# Patient Record
Sex: FEMALE | Race: WHITE | NOT HISPANIC OR LATINO | Employment: UNEMPLOYED | URBAN - METROPOLITAN AREA
[De-identification: names, ages, dates, MRNs, and addresses within clinical notes are randomized per-mention and may not be internally consistent; named-entity substitution may affect disease eponyms.]

---

## 2017-01-27 RX ORDER — LOSARTAN POTASSIUM 100 MG/1
TABLET ORAL
Qty: 30 TABLET | Refills: 2 | OUTPATIENT
Start: 2017-01-27

## 2017-01-27 NOTE — TELEPHONE ENCOUNTER
Pt needs to be seen for further refills, called and informed pt, per pt she will give us a call back to schedule f/u appt.

## 2017-11-29 ENCOUNTER — APPOINTMENT (OUTPATIENT)
Dept: NUCLEAR MEDICINE | Facility: HOSPITAL | Age: 57
End: 2017-11-29

## 2017-11-29 ENCOUNTER — APPOINTMENT (OUTPATIENT)
Dept: GENERAL RADIOLOGY | Facility: HOSPITAL | Age: 57
End: 2017-11-29

## 2017-11-29 ENCOUNTER — HOSPITAL ENCOUNTER (EMERGENCY)
Facility: HOSPITAL | Age: 57
Discharge: HOME | End: 2017-11-29
Attending: EMERGENCY MEDICINE | Admitting: EMERGENCY MEDICINE

## 2017-11-29 VITALS
BODY MASS INDEX: 37.05 KG/M2 | OXYGEN SATURATION: 92 % | WEIGHT: 217 LBS | DIASTOLIC BLOOD PRESSURE: 91 MMHG | HEIGHT: 64 IN | RESPIRATION RATE: 18 BRPM | SYSTOLIC BLOOD PRESSURE: 156 MMHG | HEART RATE: 99 BPM | TEMPERATURE: 98.6 F

## 2017-11-29 DIAGNOSIS — J40 BRONCHITIS: ICD-10-CM

## 2017-11-29 DIAGNOSIS — R74.8 ELEVATED LIVER ENZYMES: Primary | ICD-10-CM

## 2017-11-29 DIAGNOSIS — I10 HYPERTENSION, UNSPECIFIED TYPE: ICD-10-CM

## 2017-11-29 DIAGNOSIS — R06.02 SHORTNESS OF BREATH: ICD-10-CM

## 2017-11-29 DIAGNOSIS — R07.9 CHEST PAIN, UNSPECIFIED TYPE: ICD-10-CM

## 2017-11-29 LAB
ALBUMIN SERPL-MCNC: 4.5 G/DL (ref 3.2–4.8)
ALBUMIN/GLOB SERPL: 1.3 G/DL (ref 1.5–2.5)
ALP SERPL-CCNC: 111 U/L (ref 25–100)
ALT SERPL W P-5'-P-CCNC: 64 U/L (ref 7–40)
ANION GAP SERPL CALCULATED.3IONS-SCNC: 8 MMOL/L (ref 3–11)
AST SERPL-CCNC: 97 U/L (ref 0–33)
BASOPHILS # BLD AUTO: 0.02 10*3/MM3 (ref 0–0.2)
BASOPHILS NFR BLD AUTO: 0.3 % (ref 0–1)
BILIRUB SERPL-MCNC: 1.2 MG/DL (ref 0.3–1.2)
BNP SERPL-MCNC: 18 PG/ML (ref 0–100)
BUN BLD-MCNC: 7 MG/DL (ref 9–23)
BUN/CREAT SERPL: 11.7 (ref 7–25)
CALCIUM SPEC-SCNC: 9.5 MG/DL (ref 8.7–10.4)
CHLORIDE SERPL-SCNC: 103 MMOL/L (ref 99–109)
CO2 SERPL-SCNC: 26 MMOL/L (ref 20–31)
CREAT BLD-MCNC: 0.6 MG/DL (ref 0.6–1.3)
DEPRECATED RDW RBC AUTO: 54.5 FL (ref 37–54)
EOSINOPHIL # BLD AUTO: 0.12 10*3/MM3 (ref 0–0.3)
EOSINOPHIL NFR BLD AUTO: 1.7 % (ref 0–3)
ERYTHROCYTE [DISTWIDTH] IN BLOOD BY AUTOMATED COUNT: 14.2 % (ref 11.3–14.5)
FLUAV AG NPH QL: NEGATIVE
FLUBV AG NPH QL IA: NEGATIVE
GFR SERPL CREATININE-BSD FRML MDRD: 102 ML/MIN/1.73
GLOBULIN UR ELPH-MCNC: 3.5 GM/DL
GLUCOSE BLD-MCNC: 128 MG/DL (ref 70–100)
HCT VFR BLD AUTO: 45.4 % (ref 34.5–44)
HGB BLD-MCNC: 15.9 G/DL (ref 11.5–15.5)
HOLD SPECIMEN: NORMAL
HOLD SPECIMEN: NORMAL
IMM GRANULOCYTES # BLD: 0.02 10*3/MM3 (ref 0–0.03)
IMM GRANULOCYTES NFR BLD: 0.3 % (ref 0–0.6)
LIPASE SERPL-CCNC: 45 U/L (ref 6–51)
LYMPHOCYTES # BLD AUTO: 2.11 10*3/MM3 (ref 0.6–4.8)
LYMPHOCYTES NFR BLD AUTO: 29.8 % (ref 24–44)
MCH RBC QN AUTO: 36.6 PG (ref 27–31)
MCHC RBC AUTO-ENTMCNC: 35 G/DL (ref 32–36)
MCV RBC AUTO: 104.6 FL (ref 80–99)
MONOCYTES # BLD AUTO: 0.52 10*3/MM3 (ref 0–1)
MONOCYTES NFR BLD AUTO: 7.3 % (ref 0–12)
NEUTROPHILS # BLD AUTO: 4.3 10*3/MM3 (ref 1.5–8.3)
NEUTROPHILS NFR BLD AUTO: 60.6 % (ref 41–71)
PLATELET # BLD AUTO: 160 10*3/MM3 (ref 150–450)
PMV BLD AUTO: 10.9 FL (ref 6–12)
POTASSIUM BLD-SCNC: 3.7 MMOL/L (ref 3.5–5.5)
PROT SERPL-MCNC: 8 G/DL (ref 5.7–8.2)
RBC # BLD AUTO: 4.34 10*6/MM3 (ref 3.89–5.14)
SODIUM BLD-SCNC: 137 MMOL/L (ref 132–146)
TROPONIN I SERPL-MCNC: 0 NG/ML (ref 0–0.07)
TROPONIN I SERPL-MCNC: 0 NG/ML (ref 0–0.07)
WBC NRBC COR # BLD: 7.09 10*3/MM3 (ref 3.5–10.8)
WHOLE BLOOD HOLD SPECIMEN: NORMAL
WHOLE BLOOD HOLD SPECIMEN: NORMAL

## 2017-11-29 PROCEDURE — 83690 ASSAY OF LIPASE: CPT | Performed by: EMERGENCY MEDICINE

## 2017-11-29 PROCEDURE — 87804 INFLUENZA ASSAY W/OPTIC: CPT | Performed by: EMERGENCY MEDICINE

## 2017-11-29 PROCEDURE — A9540 TC99M MAA: HCPCS | Performed by: EMERGENCY MEDICINE

## 2017-11-29 PROCEDURE — 96376 TX/PRO/DX INJ SAME DRUG ADON: CPT

## 2017-11-29 PROCEDURE — 96374 THER/PROPH/DIAG INJ IV PUSH: CPT

## 2017-11-29 PROCEDURE — A9558 XE133 XENON 10MCI: HCPCS | Performed by: EMERGENCY MEDICINE

## 2017-11-29 PROCEDURE — 25010000002 ONDANSETRON PER 1 MG

## 2017-11-29 PROCEDURE — 96361 HYDRATE IV INFUSION ADD-ON: CPT

## 2017-11-29 PROCEDURE — 84484 ASSAY OF TROPONIN QUANT: CPT

## 2017-11-29 PROCEDURE — 93005 ELECTROCARDIOGRAM TRACING: CPT

## 2017-11-29 PROCEDURE — 0 XENON XE 133: Performed by: EMERGENCY MEDICINE

## 2017-11-29 PROCEDURE — 99284 EMERGENCY DEPT VISIT MOD MDM: CPT

## 2017-11-29 PROCEDURE — 25010000002 ONDANSETRON PER 1 MG: Performed by: EMERGENCY MEDICINE

## 2017-11-29 PROCEDURE — 80053 COMPREHEN METABOLIC PANEL: CPT | Performed by: EMERGENCY MEDICINE

## 2017-11-29 PROCEDURE — 78582 LUNG VENTILAT&PERFUS IMAGING: CPT

## 2017-11-29 PROCEDURE — 93005 ELECTROCARDIOGRAM TRACING: CPT | Performed by: EMERGENCY MEDICINE

## 2017-11-29 PROCEDURE — 85025 COMPLETE CBC W/AUTO DIFF WBC: CPT | Performed by: EMERGENCY MEDICINE

## 2017-11-29 PROCEDURE — 71020 HC CHEST PA AND LATERAL: CPT

## 2017-11-29 PROCEDURE — 83880 ASSAY OF NATRIURETIC PEPTIDE: CPT | Performed by: EMERGENCY MEDICINE

## 2017-11-29 PROCEDURE — 0 TECHNETIUM ALBUMIN AGGREGATED: Performed by: EMERGENCY MEDICINE

## 2017-11-29 RX ORDER — ONDANSETRON 2 MG/ML
4 INJECTION INTRAMUSCULAR; INTRAVENOUS ONCE
Status: COMPLETED | OUTPATIENT
Start: 2017-11-29 | End: 2017-11-29

## 2017-11-29 RX ORDER — ONDANSETRON 2 MG/ML
INJECTION INTRAMUSCULAR; INTRAVENOUS
Status: COMPLETED
Start: 2017-11-29 | End: 2017-11-29

## 2017-11-29 RX ORDER — ALBUTEROL SULFATE 90 UG/1
2 AEROSOL, METERED RESPIRATORY (INHALATION) EVERY 4 HOURS PRN
Qty: 1 INHALER | Refills: 0 | Status: SHIPPED | OUTPATIENT
Start: 2017-11-29 | End: 2017-12-20

## 2017-11-29 RX ORDER — DIPHENHYDRAMINE HCL 25 MG
25 TABLET ORAL EVERY 6 HOURS PRN
Qty: 20 TABLET | Refills: 0 | Status: SHIPPED | OUTPATIENT
Start: 2017-11-29 | End: 2017-12-20

## 2017-11-29 RX ORDER — AZITHROMYCIN 250 MG/1
TABLET, FILM COATED ORAL
Qty: 6 TABLET | Refills: 0 | Status: SHIPPED | OUTPATIENT
Start: 2017-11-29 | End: 2017-12-11

## 2017-11-29 RX ORDER — SODIUM CHLORIDE 0.9 % (FLUSH) 0.9 %
10 SYRINGE (ML) INJECTION AS NEEDED
Status: DISCONTINUED | OUTPATIENT
Start: 2017-11-29 | End: 2017-11-29 | Stop reason: HOSPADM

## 2017-11-29 RX ORDER — PREDNISONE 50 MG/1
50 TABLET ORAL DAILY
Qty: 5 TABLET | Refills: 0 | Status: SHIPPED | OUTPATIENT
Start: 2017-11-29 | End: 2017-12-04

## 2017-11-29 RX ORDER — ASPIRIN 81 MG/1
324 TABLET, CHEWABLE ORAL ONCE
Status: COMPLETED | OUTPATIENT
Start: 2017-11-29 | End: 2017-11-29

## 2017-11-29 RX ORDER — LORAZEPAM 1 MG/1
1 TABLET ORAL ONCE
Status: COMPLETED | OUTPATIENT
Start: 2017-11-29 | End: 2017-11-29

## 2017-11-29 RX ORDER — FAMOTIDINE 20 MG/1
20 TABLET, FILM COATED ORAL 2 TIMES DAILY
Qty: 20 TABLET | Refills: 0 | Status: SHIPPED | OUTPATIENT
Start: 2017-11-29 | End: 2020-01-13

## 2017-11-29 RX ORDER — OMEPRAZOLE 20 MG/1
20 CAPSULE, DELAYED RELEASE ORAL DAILY
COMMUNITY
End: 2017-12-20 | Stop reason: ALTCHOICE

## 2017-11-29 RX ADMIN — SODIUM CHLORIDE 1000 ML: 9 INJECTION, SOLUTION INTRAVENOUS at 17:49

## 2017-11-29 RX ADMIN — ONDANSETRON 4 MG: 2 INJECTION INTRAMUSCULAR; INTRAVENOUS at 13:15

## 2017-11-29 RX ADMIN — ONDANSETRON 4 MG: 2 INJECTION INTRAMUSCULAR; INTRAVENOUS at 17:51

## 2017-11-29 RX ADMIN — LORAZEPAM 1 MG: 1 TABLET ORAL at 13:12

## 2017-11-29 RX ADMIN — ASPIRIN 81 MG CHEWABLE TABLET 324 MG: 81 TABLET CHEWABLE at 12:10

## 2017-11-29 RX ADMIN — XENON XE-133 20.5 MILLICURIE: 10 GAS RESPIRATORY (INHALATION) at 15:30

## 2017-11-29 RX ADMIN — Medication 1 DOSE: at 15:40

## 2017-12-11 ENCOUNTER — OFFICE VISIT (OUTPATIENT)
Dept: INTERNAL MEDICINE | Facility: CLINIC | Age: 57
End: 2017-12-11

## 2017-12-11 VITALS
WEIGHT: 221.7 LBS | HEART RATE: 104 BPM | OXYGEN SATURATION: 98 % | TEMPERATURE: 97.9 F | BODY MASS INDEX: 38.05 KG/M2 | SYSTOLIC BLOOD PRESSURE: 140 MMHG | DIASTOLIC BLOOD PRESSURE: 100 MMHG

## 2017-12-11 DIAGNOSIS — D58.2 ELEVATED HEMOGLOBIN (HCC): ICD-10-CM

## 2017-12-11 DIAGNOSIS — R74.8 ELEVATED LIVER ENZYMES: Primary | ICD-10-CM

## 2017-12-11 DIAGNOSIS — R73.9 HYPERGLYCEMIA: ICD-10-CM

## 2017-12-11 DIAGNOSIS — R00.0 TACHYCARDIA: ICD-10-CM

## 2017-12-11 DIAGNOSIS — D75.89 MACROCYTOSIS: ICD-10-CM

## 2017-12-11 LAB
ALBUMIN SERPL-MCNC: 3.9 G/DL (ref 3.2–4.8)
ALBUMIN/GLOB SERPL: 1.4 G/DL (ref 1.5–2.5)
ALP SERPL-CCNC: 96 U/L (ref 25–100)
ALT SERPL W P-5'-P-CCNC: 47 U/L (ref 7–40)
ANION GAP SERPL CALCULATED.3IONS-SCNC: 8 MMOL/L (ref 3–11)
AST SERPL-CCNC: 58 U/L (ref 0–33)
BASOPHILS # BLD AUTO: 0.03 10*3/MM3 (ref 0–0.2)
BASOPHILS NFR BLD AUTO: 0.3 % (ref 0–1)
BILIRUB SERPL-MCNC: 1.7 MG/DL (ref 0.3–1.2)
BUN BLD-MCNC: 11 MG/DL (ref 9–23)
BUN/CREAT SERPL: 15.7 (ref 7–25)
CALCIUM SPEC-SCNC: 9 MG/DL (ref 8.7–10.4)
CHLORIDE SERPL-SCNC: 100 MMOL/L (ref 99–109)
CO2 SERPL-SCNC: 28 MMOL/L (ref 20–31)
CREAT BLD-MCNC: 0.7 MG/DL (ref 0.6–1.3)
DEPRECATED RDW RBC AUTO: 54.9 FL (ref 37–54)
EOSINOPHIL # BLD AUTO: 0.17 10*3/MM3 (ref 0–0.3)
EOSINOPHIL NFR BLD AUTO: 1.9 % (ref 0–3)
ERYTHROCYTE [DISTWIDTH] IN BLOOD BY AUTOMATED COUNT: 14.2 % (ref 11.3–14.5)
FERRITIN SERPL-MCNC: 542 NG/ML (ref 10–291)
GFR SERPL CREATININE-BSD FRML MDRD: 86 ML/MIN/1.73
GGT SERPL-CCNC: 275 U/L (ref 0–37)
GLOBULIN UR ELPH-MCNC: 2.7 GM/DL
GLUCOSE BLD-MCNC: 175 MG/DL (ref 70–100)
HAV IGM SERPL QL IA: NORMAL
HBA1C MFR BLD: 6 % (ref 4.8–5.6)
HBV CORE IGM SERPL QL IA: NORMAL
HBV SURFACE AG SERPL QL IA: NORMAL
HCT VFR BLD AUTO: 44.9 % (ref 34.5–44)
HCV AB SER DONR QL: NORMAL
HGB BLD-MCNC: 15.4 G/DL (ref 11.5–15.5)
IMM GRANULOCYTES # BLD: 0.01 10*3/MM3 (ref 0–0.03)
IMM GRANULOCYTES NFR BLD: 0.1 % (ref 0–0.6)
IRON 24H UR-MRATE: 226 MCG/DL (ref 50–175)
IRON SATN MFR SERPL: 74 % (ref 15–50)
LYMPHOCYTES # BLD AUTO: 2.9 10*3/MM3 (ref 0.6–4.8)
LYMPHOCYTES NFR BLD AUTO: 32.3 % (ref 24–44)
MCH RBC QN AUTO: 36.4 PG (ref 27–31)
MCHC RBC AUTO-ENTMCNC: 34.3 G/DL (ref 32–36)
MCV RBC AUTO: 106.1 FL (ref 80–99)
MONOCYTES # BLD AUTO: 0.62 10*3/MM3 (ref 0–1)
MONOCYTES NFR BLD AUTO: 6.9 % (ref 0–12)
NEUTROPHILS # BLD AUTO: 5.25 10*3/MM3 (ref 1.5–8.3)
NEUTROPHILS NFR BLD AUTO: 58.5 % (ref 41–71)
PLATELET # BLD AUTO: 182 10*3/MM3 (ref 150–450)
PMV BLD AUTO: 11.5 FL (ref 6–12)
POTASSIUM BLD-SCNC: 3.5 MMOL/L (ref 3.5–5.5)
PROT SERPL-MCNC: 6.6 G/DL (ref 5.7–8.2)
RBC # BLD AUTO: 4.23 10*6/MM3 (ref 3.89–5.14)
SODIUM BLD-SCNC: 136 MMOL/L (ref 132–146)
TIBC SERPL-MCNC: 304 MCG/DL (ref 250–450)
TSH SERPL DL<=0.05 MIU/L-ACNC: 4.42 MIU/ML (ref 0.35–5.35)
VIT B12 BLD-MCNC: 417 PG/ML (ref 211–911)
WBC NRBC COR # BLD: 8.98 10*3/MM3 (ref 3.5–10.8)

## 2017-12-11 PROCEDURE — 84443 ASSAY THYROID STIM HORMONE: CPT | Performed by: INTERNAL MEDICINE

## 2017-12-11 PROCEDURE — 82607 VITAMIN B-12: CPT | Performed by: INTERNAL MEDICINE

## 2017-12-11 PROCEDURE — 83516 IMMUNOASSAY NONANTIBODY: CPT | Performed by: INTERNAL MEDICINE

## 2017-12-11 PROCEDURE — 82977 ASSAY OF GGT: CPT | Performed by: INTERNAL MEDICINE

## 2017-12-11 PROCEDURE — 80053 COMPREHEN METABOLIC PANEL: CPT | Performed by: INTERNAL MEDICINE

## 2017-12-11 PROCEDURE — 83036 HEMOGLOBIN GLYCOSYLATED A1C: CPT | Performed by: INTERNAL MEDICINE

## 2017-12-11 PROCEDURE — 85025 COMPLETE CBC W/AUTO DIFF WBC: CPT | Performed by: INTERNAL MEDICINE

## 2017-12-11 PROCEDURE — 83550 IRON BINDING TEST: CPT | Performed by: INTERNAL MEDICINE

## 2017-12-11 PROCEDURE — 80074 ACUTE HEPATITIS PANEL: CPT | Performed by: INTERNAL MEDICINE

## 2017-12-11 PROCEDURE — 83540 ASSAY OF IRON: CPT | Performed by: INTERNAL MEDICINE

## 2017-12-11 PROCEDURE — 99214 OFFICE O/P EST MOD 30 MIN: CPT | Performed by: INTERNAL MEDICINE

## 2017-12-11 PROCEDURE — 82668 ASSAY OF ERYTHROPOIETIN: CPT | Performed by: INTERNAL MEDICINE

## 2017-12-11 PROCEDURE — 86038 ANTINUCLEAR ANTIBODIES: CPT | Performed by: INTERNAL MEDICINE

## 2017-12-11 PROCEDURE — 82103 ALPHA-1-ANTITRYPSIN TOTAL: CPT | Performed by: INTERNAL MEDICINE

## 2017-12-11 PROCEDURE — 82728 ASSAY OF FERRITIN: CPT | Performed by: INTERNAL MEDICINE

## 2017-12-11 RX ORDER — LOSARTAN POTASSIUM AND HYDROCHLOROTHIAZIDE 12.5; 1 MG/1; MG/1
TABLET ORAL
COMMUNITY
Start: 2017-11-18 | End: 2018-05-31 | Stop reason: SDUPTHER

## 2017-12-11 NOTE — PROGRESS NOTES
Subjective   Mirna Saavedra is a 57 y.o. female.   Chief Complaint   Patient presents with   • Shortness of Breath         History of Present Illness   Went to ER for SOB that started   2.5 weeks ago and continues.    . Also had CP but that has resolved. VQ scan done secondary to contrast allergy. Showed low probability for pE. CXR normal. CBC showed increased h and h with increased mcv. LFT's high.   Trop neg. Flu neg.  Continues with cough. No fever or chills.  No nausea or vomiting. No abdominal pain.  The following portions of the patient's history were reviewed and updated as appropriate: allergies, current medications, past family history, past medical history, past social history, past surgical history and problem list.    Review of Systems  /100  Pulse 104  Temp 97.9 °F (36.6 °C)  Wt 101 kg (221 lb 11.2 oz)  SpO2 98%  BMI 38.05 kg/m2    Objective   Physical Exam   Constitutional: She appears well-developed.   HENT:   Head: Normocephalic.   Right Ear: External ear normal.   Left Ear: External ear normal.   Nose: Nose normal.   Mouth/Throat: Oropharynx is clear and moist.   Eyes: Conjunctivae are normal. Pupils are equal, round, and reactive to light.   Neck: No JVD present. No thyromegaly present.   Cardiovascular: Normal rate, regular rhythm and normal heart sounds.  Exam reveals no friction rub.    No murmur heard.  Pulmonary/Chest: Effort normal and breath sounds normal. No respiratory distress. She has no wheezes. She has no rales.   Abdominal: Soft. Bowel sounds are normal. She exhibits no distension. There is no tenderness. There is no guarding.   Musculoskeletal: She exhibits no edema or tenderness.   Lymphadenopathy:     She has no cervical adenopathy.   Neurological: She displays normal reflexes. No cranial nerve deficit.   Skin: No rash noted.   Psychiatric: Her behavior is normal.   Nursing note and vitals reviewed.      Assessment/Plan   Mirna was seen today for shortness of  breath.    Diagnoses and all orders for this visit:    Elevated liver enzymes  -     Comprehensive Metabolic Panel  -     Nuclear Antigen Antibody, IFA; Future  -     Ferritin  -     Hepatitis Panel, Acute  -     Iron Profile  -     Mitochondrial Antibodies, M2  -     Alpha - 1 - Antitrypsin  -     Anti-Smooth Muscle Antibody Titer  -     Gamma GT    Macrocytosis  -     CBC & Differential  -     Vitamin B12    Elevated hemoglobin  -     Erythropoietin; Future    Hyperglycemia  -     Hemoglobin A1c; Future    Tachycardia  -     TSH      Sats are okay. Will see back on Friday. Awaiting labs.

## 2017-12-12 LAB
A1AT SERPL-MCNC: 169 MG/DL (ref 90–200)
ACTIN IGG SERPL-ACNC: 14 UNITS (ref 0–19)
ANA HOMOGEN TITR SER: ABNORMAL {TITER}
ANA SER QL IA: POSITIVE
DEPRECATED MITOCHONDRIA M2 IGG SER-ACNC: <20 UNITS (ref 0–20)
ETHNIC BACKGROUND STATED: 2.5 MIU/ML (ref 2.6–18.5)
Lab: ABNORMAL

## 2017-12-13 ENCOUNTER — TELEPHONE (OUTPATIENT)
Dept: INTERNAL MEDICINE | Facility: CLINIC | Age: 57
End: 2017-12-13

## 2017-12-13 DIAGNOSIS — R17 ELEVATED BILIRUBIN: Primary | ICD-10-CM

## 2017-12-13 DIAGNOSIS — R71.8 ELEVATED HEMATOCRIT: ICD-10-CM

## 2017-12-15 ENCOUNTER — LAB (OUTPATIENT)
Dept: INTERNAL MEDICINE | Facility: CLINIC | Age: 57
End: 2017-12-15

## 2017-12-15 DIAGNOSIS — R17 ELEVATED BILIRUBIN: ICD-10-CM

## 2017-12-15 DIAGNOSIS — R71.8 ELEVATED HEMATOCRIT: ICD-10-CM

## 2017-12-15 LAB
BILIRUB CONJ SERPL-MCNC: 0.6 MG/DL (ref 0–0.2)
BILIRUB INDIRECT SERPL-MCNC: 1 MG/DL (ref 0.1–1.1)
BILIRUB SERPL-MCNC: 1.6 MG/DL (ref 0.3–1.2)

## 2017-12-15 PROCEDURE — 82248 BILIRUBIN DIRECT: CPT | Performed by: INTERNAL MEDICINE

## 2017-12-15 PROCEDURE — 81403 MOPATH PROCEDURE LEVEL 4: CPT

## 2017-12-15 PROCEDURE — 82247 BILIRUBIN TOTAL: CPT | Performed by: INTERNAL MEDICINE

## 2017-12-20 ENCOUNTER — OFFICE VISIT (OUTPATIENT)
Dept: INTERNAL MEDICINE | Facility: CLINIC | Age: 57
End: 2017-12-20

## 2017-12-20 VITALS
BODY MASS INDEX: 37.69 KG/M2 | OXYGEN SATURATION: 98 % | HEART RATE: 116 BPM | WEIGHT: 219.6 LBS | SYSTOLIC BLOOD PRESSURE: 120 MMHG | DIASTOLIC BLOOD PRESSURE: 90 MMHG

## 2017-12-20 DIAGNOSIS — R79.89 ABNORMAL BILIRUBIN TEST: ICD-10-CM

## 2017-12-20 DIAGNOSIS — D58.2 ELEVATED HEMOGLOBIN (HCC): ICD-10-CM

## 2017-12-20 DIAGNOSIS — K76.0 FATTY LIVER: ICD-10-CM

## 2017-12-20 DIAGNOSIS — R76.8 POSITIVE ANA (ANTINUCLEAR ANTIBODY): Primary | ICD-10-CM

## 2017-12-20 LAB
BILIRUB CONJ SERPL-MCNC: 0.3 MG/DL (ref 0–0.2)
BILIRUB INDIRECT SERPL-MCNC: 0.7 MG/DL (ref 0.1–1.1)
BILIRUB SERPL-MCNC: 1 MG/DL (ref 0.3–1.2)

## 2017-12-20 PROCEDURE — 99214 OFFICE O/P EST MOD 30 MIN: CPT | Performed by: INTERNAL MEDICINE

## 2017-12-20 PROCEDURE — 82247 BILIRUBIN TOTAL: CPT | Performed by: INTERNAL MEDICINE

## 2017-12-20 PROCEDURE — 82248 BILIRUBIN DIRECT: CPT | Performed by: INTERNAL MEDICINE

## 2017-12-20 NOTE — PROGRESS NOTES
Subjective   Mirna Saavedra is a 57 y.o. female.     History of Present Illness   Chief Complaint   Patient presents with   • follow up on labs     Hemoglobin and hematocrit have been high.   Added EPO which is low and now waiting on KADEEM.  Hx elevated liver enzymes which AMA, michelle, acute hepatitis panel, were negative in 4/2016. Also, in 4/2016 Ast 71, alt 55, ferritin 527 and normal iron, no hemochromatosis mutation. Repeated  AMA, acute hep , alpha 1, and anti smooth antibody 12/2017 were negative. MICHELLE positive with titer 1:320 now 12/2017.  Also bilirubin total and direct high.  ALT 47 and ast 58 and .  Liver ultrasound done in 4/2016 showed fatty liver.  The following portions of the patient's history were reviewed and updated as appropriate: allergies, current medications, past family history, past medical history, past social history, past surgical history and problem list.    Review of Systems   Constitutional: Negative for activity change, appetite change, chills, diaphoresis, fatigue, fever and unexpected weight change.   HENT: Negative for congestion, ear discharge, ear pain, mouth sores, nosebleeds, sinus pressure, sneezing and sore throat.    Eyes: Negative for pain, discharge and itching.   Respiratory: Negative for cough, chest tightness, shortness of breath and wheezing.    Cardiovascular: Negative for chest pain, palpitations and leg swelling.   Gastrointestinal: Negative for abdominal pain, constipation, diarrhea, nausea and vomiting.   Endocrine: Negative for cold intolerance, heat intolerance, polydipsia and polyphagia.   Genitourinary: Negative for dysuria, flank pain, frequency, hematuria and urgency.   Musculoskeletal: Negative for arthralgias, back pain, gait problem, myalgias, neck pain and neck stiffness.   Skin: Negative for color change, pallor and rash.   Neurological: Negative for seizures, speech difficulty, numbness and headaches.   Psychiatric/Behavioral: Negative for agitation,  confusion, decreased concentration and sleep disturbance. The patient is not nervous/anxious.      /90  Pulse 116  Wt 99.6 kg (219 lb 9.6 oz)  SpO2 98%  BMI 37.69 kg/m2    Objective   Physical Exam   Constitutional: She is oriented to person, place, and time. She appears well-developed.   HENT:   Head: Normocephalic.   Right Ear: External ear normal.   Left Ear: External ear normal.   Nose: Nose normal.   Mouth/Throat: Oropharynx is clear and moist.   Eyes: Conjunctivae are normal. Pupils are equal, round, and reactive to light.   Neck: No JVD present. No thyromegaly present.   Cardiovascular: Normal rate, regular rhythm and normal heart sounds.  Exam reveals no friction rub.    No murmur heard.  Pulmonary/Chest: Effort normal and breath sounds normal. No respiratory distress. She has no wheezes. She has no rales.   Abdominal: Soft. Bowel sounds are normal. She exhibits no distension. There is no tenderness. There is no guarding.   Musculoskeletal: She exhibits no edema or tenderness.   Lymphadenopathy:     She has no cervical adenopathy.   Neurological: She is oriented to person, place, and time. She displays normal reflexes. No cranial nerve deficit.   Skin: No rash noted.   Psychiatric: Her behavior is normal.   Nursing note and vitals reviewed.      Assessment/Plan   Mirna was seen today for follow up on labs.    Diagnoses and all orders for this visit:    Positive EMILY (antinuclear antibody)  -     Ambulatory Referral to Rheumatology    Elevated hemoglobin  -     Ambulatory Referral to Hematology  Likely polycythemia , awaiting additional labs and hem eval.   Abnormal bilirubin test  -     Bilirubin, Total & Direct  Awaiting repeat levels but will likely need to be evaluated by GI.   Fatty liver    low fat diet. Weight loss.    50% of visit spent counseling.

## 2017-12-28 ENCOUNTER — TELEPHONE (OUTPATIENT)
Dept: INTERNAL MEDICINE | Facility: CLINIC | Age: 57
End: 2017-12-28

## 2017-12-29 ENCOUNTER — LAB (OUTPATIENT)
Dept: LAB | Facility: HOSPITAL | Age: 57
End: 2017-12-29

## 2017-12-29 ENCOUNTER — CONSULT (OUTPATIENT)
Dept: ONCOLOGY | Facility: CLINIC | Age: 57
End: 2017-12-29

## 2017-12-29 VITALS
HEART RATE: 116 BPM | BODY MASS INDEX: 37.56 KG/M2 | HEIGHT: 64 IN | SYSTOLIC BLOOD PRESSURE: 157 MMHG | RESPIRATION RATE: 16 BRPM | TEMPERATURE: 97.8 F | DIASTOLIC BLOOD PRESSURE: 83 MMHG | WEIGHT: 220 LBS

## 2017-12-29 DIAGNOSIS — D75.1 ERYTHROCYTOSIS: Primary | ICD-10-CM

## 2017-12-29 DIAGNOSIS — D75.1 ERYTHROCYTOSIS: ICD-10-CM

## 2017-12-29 DIAGNOSIS — R74.8 ELEVATED LIVER ENZYMES: ICD-10-CM

## 2017-12-29 DIAGNOSIS — R79.89 HIGH SERUM FERRITIN: ICD-10-CM

## 2017-12-29 LAB
CRP SERPL-MCNC: 1.32 MG/DL (ref 0–1)
ERYTHROCYTE [SEDIMENTATION RATE] IN BLOOD: 38 MM/HR (ref 0–30)
RETICS/RBC NFR AUTO: 2.12 % (ref 0.5–1.5)

## 2017-12-29 PROCEDURE — 85652 RBC SED RATE AUTOMATED: CPT

## 2017-12-29 PROCEDURE — 36415 COLL VENOUS BLD VENIPUNCTURE: CPT

## 2017-12-29 PROCEDURE — 99204 OFFICE O/P NEW MOD 45 MIN: CPT | Performed by: INTERNAL MEDICINE

## 2017-12-29 PROCEDURE — 85045 AUTOMATED RETICULOCYTE COUNT: CPT

## 2017-12-29 PROCEDURE — 86140 C-REACTIVE PROTEIN: CPT

## 2017-12-29 RX ORDER — ALPRAZOLAM 0.5 MG/1
0.5 TABLET ORAL AS NEEDED
COMMUNITY
End: 2019-08-26 | Stop reason: SDUPTHER

## 2017-12-29 RX ORDER — OMEPRAZOLE 20 MG/1
20 CAPSULE, DELAYED RELEASE ORAL DAILY
COMMUNITY
End: 2019-07-11 | Stop reason: ALTCHOICE

## 2017-12-29 NOTE — PROGRESS NOTES
ID: 57 y.o. year old female from [place] [id number]    PCP: Chiquita Acosta DO    REFERRING PHYSICIAN: Dr. Acosta    Reason for Consultation: Erythrocytosis and Elevated Iron and Ferritin    Dear Dr. Acosta    It is a pleasure to meet Mrs. Saavedra today.   She presents today for consultation regarding mild erythrocytosis and also iron levels that are elevated.  She reports that she does wake up tired.  Her  reports that she snores.  She has been referred for sleep study in the past though she was uncomfortable sleeping and a new place so she did not undergo that process.  Otherwise she does not have any chronic medical issues.  She is EMILY positive however does not have any rheumatologic disorder that we can see.  She has mildly elevated liver enzymes with underlying fatty liver disease.      Past Medical History:   Diagnosis Date   • Elevated ferritin    • Elevated liver enzymes    • Fatigue    • Hair loss    • Hypertension    • Mitral valve prolapse    • Ovarian cyst    • Peptic ulceration        Past Surgical History:   Procedure Laterality Date   • BREAST BIOPSY Right 2010   • BREAST LUMPECTOMY Left 2010   • COLONOSCOPY  2008   • EXPLORATORY LAPAROTOMY  1993   • HAND SURGERY Left 1987    torn ligament repair   • HYSTERECTOMY  2006   • HYSTEROSCOPY     • HYSTEROSCOPY  1993   • SKIN BIOPSY      multiple       Social History     Social History   • Marital status:      Spouse name: N/A   • Number of children: N/A   • Years of education: N/A     Social History Main Topics   • Smoking status: Former Smoker     Years: 5.00     Types: Cigarettes     Quit date: 1979   • Smokeless tobacco: Never Used   • Alcohol use Yes      Comment: daily glass of wine   • Drug use: Yes     Special: Marijuana      Comment: smoked for 5 years prior to quitting in 1978   • Sexual activity: Yes     Partners: Male     Birth control/ protection: None     Other Topics Concern   • None     Social History Narrative       Family  History   Problem Relation Age of Onset   • Breast cancer Other    • Migraines Other    • Scoliosis Other      amyotrophic lateral sclerosis    • Tuberculosis Other    • Breast cancer Mother    • Breast cancer Sister    • ALS Sister        Review of Systems:    16 point review of systems was performed and reviewed and scanned into the EMR      Current Outpatient Prescriptions:   •  ALPRAZolam (XANAX) 0.5 MG tablet, Take 0.5 mg by mouth As Needed for Anxiety., Disp: , Rfl:   •  losartan-hydrochlorothiazide (HYZAAR) 100-12.5 MG per tablet, , Disp: , Rfl:   •  omeprazole (priLOSEC) 20 MG capsule, Take 20 mg by mouth Daily., Disp: , Rfl:   •  famotidine (PEPCID) 20 MG tablet, Take 1 tablet by mouth 2 (Two) Times a Day., Disp: 20 tablet, Rfl: 0    Allergies   Allergen Reactions   • Contrast Dye Hives       ECOG SCORE: 0    Objective     Vitals:    12/29/17 1440   BP: 157/83   Pulse: 116   Resp: 16   Temp: 97.8 °F (36.6 °C)       Physical Exam   Constitutional: She is oriented to person, place, and time. She appears well-developed and well-nourished.   HENT:   Head: Normocephalic and atraumatic.   Right Ear: External ear normal.   Left Ear: External ear normal.   Mouth/Throat: Oropharynx is clear and moist.   Eyes: Conjunctivae and EOM are normal.   Neck: Normal range of motion. Neck supple.   Cardiovascular: Normal rate, regular rhythm and normal heart sounds.    Pulmonary/Chest: Effort normal and breath sounds normal.   Abdominal: Soft. Bowel sounds are normal.   Musculoskeletal: Normal range of motion.   Neurological: She is alert and oriented to person, place, and time.   Skin: Skin is warm and dry.   Psychiatric: She has a normal mood and affect. Her behavior is normal. Judgment and thought content normal.     Lab Results   Component Value Date    HGB 15.4 12/11/2017    HCT 44.9 (H) 12/11/2017    .1 (H) 12/11/2017     12/11/2017    WBC 8.98 12/11/2017    NEUTROABS 5.25 12/11/2017    LYMPHSABS 2.90  12/11/2017    MONOSABS 0.62 12/11/2017    EOSABS 0.17 12/11/2017    BASOSABS 0.03 12/11/2017     Reticulocyte % 0.50 - 1.50 % 2.12 (H)     Lab Results   Component Value Date    HGB 15.4 12/11/2017    HGB 15.9 (H) 11/29/2017    HGB 15.6 (H) 01/15/2016     Lab Results   Component Value Date    FERRITIN 542.00 (H) 12/11/2017    FERRITIN 527 (H) 04/07/2016     Lab Results   Component Value Date    .1 (H) 12/11/2017    .6 (H) 11/29/2017    MCV 97.6 01/15/2016     Lab Results   Component Value Date    TIBC 304 12/11/2017     Lab Results   Component Value Date    LABIRON 74 (H) 12/11/2017     Lab Results   Component Value Date    IRON 226 (H) 12/11/2017    IRON 139 04/07/2016         ASSESSMENT:    1. Mild Erythrocytosis;  she has mild elevation in her MCV.  I suspect this is related to underlying sleep apnea.  In the past she had been referred for sleep study though she did not go through with it.  I asked her to discuss this with Dr. Acosta and have this done again.  Her underlying sleep disorder can explain a lot of her symptoms including extreme fatigue.  It can also cause an underlying inflammatory state.  The mild elevation in her MCV is related to mild reticulocytosis that's ongoing.  At this time she does not need any intervention from the standpoint. Koby 2 is pending to rule out Polycythemia Vera.    2.  Elevated ferritin and iron saturations: She is negative for the hemochromatosis gene.  She may have mild inflammatory process that may be causing some of her ferritin to be elevated.  The iron saturations are also elevated.  I don't feel that she needs any phlebotomies since her numbers are not that elevated.    I went over the differential with her and her  today and answered all the questions that they had for me    Thank you for allowing me to participate in the care of this patient.    Yours sincerely,    Isaac Ryan MD  [facility]  Hematology and  Oncology        Please note that portions of this note may have been completed with a voice recognition program. Efforts were made to edit the dictations, but occasionally words are mistranscribed.

## 2018-01-03 LAB
JAK2 EXONS 12-15 MUT DET PCR: NORMAL
LAB DIRECTOR NAME PROVIDER: NORMAL
LABORATORY COMMENT REPORT: NORMAL
Lab: NORMAL
METHOD: NORMAL
REASON FOR REFERRAL (NARRATIVE): NORMAL
SPECIMEN SOURCE: NORMAL

## 2018-05-31 RX ORDER — LOSARTAN POTASSIUM AND HYDROCHLOROTHIAZIDE 12.5; 1 MG/1; MG/1
1 TABLET ORAL DAILY
Qty: 30 TABLET | Refills: 2 | Status: SHIPPED | OUTPATIENT
Start: 2018-05-31 | End: 2018-08-21 | Stop reason: SDUPTHER

## 2018-05-31 NOTE — TELEPHONE ENCOUNTER
----- Message from April ARIAN Robison sent at 5/30/2018  3:26 PM EDT -----  CALL FROM PT STATING SHE NEEDS A REFILL ON losartan-hydrochlorothiazide (HYZAAR) 100-12.5 MG per tablet STATES THAT SHE CALLED THE PHARMACY AND THEY STATED THEY HAD SENT OVER A REQUEST BUT HADN'T GOTTEN ANYTHING BACK. PT STATES THAT SHE IS OUT OF MEDS. [facility] [place] [facility] CALL BACK NUMBER FOR PT [phone].

## 2018-08-21 ENCOUNTER — OFFICE VISIT (OUTPATIENT)
Dept: INTERNAL MEDICINE | Facility: CLINIC | Age: 58
End: 2018-08-21

## 2018-08-21 VITALS
DIASTOLIC BLOOD PRESSURE: 100 MMHG | WEIGHT: 221.2 LBS | BODY MASS INDEX: 37.97 KG/M2 | OXYGEN SATURATION: 98 % | HEART RATE: 94 BPM | SYSTOLIC BLOOD PRESSURE: 140 MMHG

## 2018-08-21 DIAGNOSIS — R06.83 SNORES: ICD-10-CM

## 2018-08-21 DIAGNOSIS — D75.1 ERYTHROCYTOSIS: ICD-10-CM

## 2018-08-21 DIAGNOSIS — Z12.11 COLON CANCER SCREENING: Primary | ICD-10-CM

## 2018-08-21 DIAGNOSIS — R74.8 ELEVATED LIVER ENZYMES: ICD-10-CM

## 2018-08-21 DIAGNOSIS — R73.03 PREDIABETES: ICD-10-CM

## 2018-08-21 DIAGNOSIS — K21.9 GASTROESOPHAGEAL REFLUX DISEASE, ESOPHAGITIS PRESENCE NOT SPECIFIED: ICD-10-CM

## 2018-08-21 DIAGNOSIS — I10 BENIGN ESSENTIAL HYPERTENSION: Primary | ICD-10-CM

## 2018-08-21 LAB
GLUCOSE BLDC GLUCOMTR-MCNC: 166 MG/DL (ref 70–130)
HBA1C MFR BLD: 6 %

## 2018-08-21 PROCEDURE — 99214 OFFICE O/P EST MOD 30 MIN: CPT | Performed by: INTERNAL MEDICINE

## 2018-08-21 PROCEDURE — 83036 HEMOGLOBIN GLYCOSYLATED A1C: CPT | Performed by: INTERNAL MEDICINE

## 2018-08-21 PROCEDURE — 82947 ASSAY GLUCOSE BLOOD QUANT: CPT | Performed by: INTERNAL MEDICINE

## 2018-08-21 RX ORDER — LOSARTAN POTASSIUM AND HYDROCHLOROTHIAZIDE 25; 100 MG/1; MG/1
1 TABLET ORAL DAILY
Qty: 30 TABLET | Refills: 5 | Status: SHIPPED | OUTPATIENT
Start: 2018-08-21 | End: 2019-02-20 | Stop reason: SDUPTHER

## 2018-08-21 RX ORDER — LOSARTAN POTASSIUM AND HYDROCHLOROTHIAZIDE 12.5; 1 MG/1; MG/1
1 TABLET ORAL DAILY
Qty: 30 TABLET | Refills: 5 | Status: SHIPPED | OUTPATIENT
Start: 2018-08-21 | End: 2018-08-21 | Stop reason: DRUGHIGH

## 2018-08-21 NOTE — PROGRESS NOTES
Subjective   Mirna Saavedra is a 58 y.o. female.   Chief Complaint   Patient presents with   • Heartburn   • Hypertension       History of Present Illness   Here for f/u on htn, prediabtes, erthrocytosis, and elevated liver enzymes. Hurtburn is table with PPI.  BP running high and wants to go up on dose. Sugars have been controlled with diet.   The following portions of the patient's history were reviewed and updated as appropriate: allergies, current medications, past family history, past medical history, past social history, past surgical history and problem list.    Review of Systems   Constitutional: Negative for activity change, appetite change, chills, diaphoresis, fatigue, fever and unexpected weight change.   HENT: Negative for congestion, ear discharge, ear pain, mouth sores, nosebleeds, sinus pressure, sneezing and sore throat.    Eyes: Negative for pain, discharge and itching.   Respiratory: Negative for cough, chest tightness, shortness of breath and wheezing.    Cardiovascular: Negative for chest pain, palpitations and leg swelling.   Gastrointestinal: Negative for abdominal pain, constipation, diarrhea, nausea and vomiting.   Endocrine: Negative for cold intolerance, heat intolerance, polydipsia and polyphagia.   Genitourinary: Negative for dysuria, flank pain, frequency, hematuria and urgency.   Musculoskeletal: Negative for arthralgias, back pain, gait problem, myalgias, neck pain and neck stiffness.   Skin: Negative for color change, pallor and rash.   Neurological: Negative for seizures, speech difficulty, numbness and headaches.   Psychiatric/Behavioral: Negative for agitation, confusion, decreased concentration and sleep disturbance. The patient is not nervous/anxious.        Objective   Physical Exam   Constitutional: She appears well-developed.   HENT:   Head: Normocephalic.   Right Ear: External ear normal.   Left Ear: External ear normal.   Nose: Nose normal.   Mouth/Throat: Oropharynx is  clear and moist.   Eyes: Pupils are equal, round, and reactive to light. Conjunctivae are normal.   Neck: No JVD present. No thyromegaly present.   Cardiovascular: Normal rate, regular rhythm and normal heart sounds.  Exam reveals no friction rub.    No murmur heard.  Pulmonary/Chest: Effort normal and breath sounds normal. No respiratory distress. She has no wheezes. She has no rales.   Abdominal: Soft. Bowel sounds are normal. She exhibits no distension. There is no tenderness. There is no guarding.   Musculoskeletal: She exhibits no edema or tenderness.   Lymphadenopathy:     She has no cervical adenopathy.   Neurological: She displays normal reflexes. No cranial nerve deficit.   Skin: No rash noted.   Psychiatric: Her behavior is normal.   Nursing note and vitals reviewed.      Assessment/Plan   Mirna was seen today for heartburn and hypertension.    Diagnoses and all orders for this visit:    Benign essential hypertension  Increase losartan hctz 100/25 po qd Check potassium at follow up in 3 weeks  Prediabetes  A1c is 6.0 today  Erythrocytosis  Evaluated by heme.   Elevated liver enzymes  Secondary to fatty liver. Working on weight loss  Snores  -     Ambulatory Referral to Sleep Medicine    Other orders  -     Discontinue: losartan-hydrochlorothiazide (HYZAAR) 100-12.5 MG per tablet; Take 1 tablet by mouth Daily.  -     losartan-hydrochlorothiazide (HYZAAR) 100-25 MG per tablet; Take 1 tablet by mouth Daily.

## 2018-09-21 ENCOUNTER — OFFICE VISIT (OUTPATIENT)
Dept: INTERNAL MEDICINE | Facility: CLINIC | Age: 58
End: 2018-09-21

## 2018-09-21 VITALS
OXYGEN SATURATION: 99 % | HEART RATE: 92 BPM | WEIGHT: 218.1 LBS | BODY MASS INDEX: 37.44 KG/M2 | DIASTOLIC BLOOD PRESSURE: 84 MMHG | SYSTOLIC BLOOD PRESSURE: 152 MMHG

## 2018-09-21 DIAGNOSIS — I10 BENIGN ESSENTIAL HYPERTENSION: Primary | ICD-10-CM

## 2018-09-21 PROCEDURE — 99213 OFFICE O/P EST LOW 20 MIN: CPT | Performed by: INTERNAL MEDICINE

## 2018-09-21 RX ORDER — AMLODIPINE BESYLATE 5 MG/1
5 TABLET ORAL DAILY
Qty: 30 TABLET | Refills: 1 | Status: SHIPPED | OUTPATIENT
Start: 2018-09-21 | End: 2018-10-24 | Stop reason: SINTOL

## 2018-09-21 NOTE — PROGRESS NOTES
Subjective   Mirna Saavedra is a 58 y.o. female.   Chief Complaint   Patient presents with   • Hypertension     Follow Up       History of Present Illness   F/u on htn. BP is still running high. No nausea or vomiting. No HA.   The following portions of the patient's history were reviewed and updated as appropriate: allergies, current medications, past family history, past medical history, past social history, past surgical history and problem list.    Review of Systems   Constitutional: Negative for activity change, appetite change, chills, diaphoresis, fatigue, fever and unexpected weight change.   HENT: Negative for congestion, ear discharge, ear pain, mouth sores, nosebleeds, sinus pressure, sneezing and sore throat.    Eyes: Negative for pain, discharge and itching.   Respiratory: Negative for cough, chest tightness, shortness of breath and wheezing.    Cardiovascular: Negative for chest pain, palpitations and leg swelling.   Gastrointestinal: Negative for abdominal pain, constipation, diarrhea, nausea and vomiting.   Endocrine: Negative for cold intolerance, heat intolerance, polydipsia and polyphagia.   Genitourinary: Negative for dysuria, flank pain, frequency, hematuria and urgency.   Musculoskeletal: Negative for arthralgias, back pain, gait problem, myalgias, neck pain and neck stiffness.   Skin: Negative for color change, pallor and rash.   Neurological: Negative for seizures, speech difficulty, numbness and headaches.   Psychiatric/Behavioral: Negative for agitation, confusion, decreased concentration and sleep disturbance. The patient is not nervous/anxious.      /84   Pulse 92   Wt 98.9 kg (218 lb 1.6 oz)   SpO2 99%   Breastfeeding? No   BMI 37.44 kg/m²     Objective   Physical Exam   Constitutional: She appears well-developed.   HENT:   Head: Normocephalic.   Right Ear: External ear normal.   Left Ear: External ear normal.   Nose: Nose normal.   Mouth/Throat: Oropharynx is clear and moist.    Eyes: Pupils are equal, round, and reactive to light. Conjunctivae are normal.   Neck: No JVD present. No thyromegaly present.   Cardiovascular: Normal rate, regular rhythm and normal heart sounds.  Exam reveals no friction rub.    No murmur heard.  Pulmonary/Chest: Effort normal and breath sounds normal. No respiratory distress. She has no wheezes. She has no rales.   Abdominal: Soft. Bowel sounds are normal. She exhibits no distension. There is no tenderness. There is no guarding.   Musculoskeletal: She exhibits no edema or tenderness.   Lymphadenopathy:     She has no cervical adenopathy.   Neurological: She displays normal reflexes. No cranial nerve deficit.   Skin: No rash noted.   Psychiatric: Her behavior is normal.   Nursing note and vitals reviewed.      Assessment/Plan   Mirna was seen today for hypertension.    Diagnoses and all orders for this visit:    Benign essential hypertension  -     amLODIPine (NORVASC) 5 MG tablet; Take 1 tablet by mouth Daily.

## 2018-10-24 ENCOUNTER — OFFICE VISIT (OUTPATIENT)
Dept: INTERNAL MEDICINE | Facility: CLINIC | Age: 58
End: 2018-10-24

## 2018-10-24 VITALS
DIASTOLIC BLOOD PRESSURE: 70 MMHG | WEIGHT: 214.25 LBS | RESPIRATION RATE: 20 BRPM | TEMPERATURE: 98.5 F | SYSTOLIC BLOOD PRESSURE: 136 MMHG | OXYGEN SATURATION: 96 % | HEART RATE: 110 BPM | BODY MASS INDEX: 36.78 KG/M2

## 2018-10-24 DIAGNOSIS — I10 BENIGN ESSENTIAL HYPERTENSION: ICD-10-CM

## 2018-10-24 DIAGNOSIS — K21.9 GASTROESOPHAGEAL REFLUX DISEASE, ESOPHAGITIS PRESENCE NOT SPECIFIED: ICD-10-CM

## 2018-10-24 DIAGNOSIS — R73.03 PREDIABETES: Primary | ICD-10-CM

## 2018-10-24 LAB — HBA1C MFR BLD: 5.6 %

## 2018-10-24 PROCEDURE — 83036 HEMOGLOBIN GLYCOSYLATED A1C: CPT | Performed by: INTERNAL MEDICINE

## 2018-10-24 PROCEDURE — 99213 OFFICE O/P EST LOW 20 MIN: CPT | Performed by: INTERNAL MEDICINE

## 2018-10-24 PROCEDURE — 90471 IMMUNIZATION ADMIN: CPT | Performed by: INTERNAL MEDICINE

## 2018-10-24 PROCEDURE — 90674 CCIIV4 VAC NO PRSV 0.5 ML IM: CPT | Performed by: INTERNAL MEDICINE

## 2018-10-24 NOTE — PROGRESS NOTES
Subjective   Mirna Saavedra is a 58 y.o. female.   Chief Complaint   Patient presents with   • Hypertension     follow up       Hypertension   Pertinent negatives include no chest pain, headaches, neck pain, palpitations or shortness of breath.      Here for f/u on htn, prediabtes, erythrocytosis, and elevated liver enzymes. Hurtburn is table with PPI.  BP running high and wants to go up on dose. Sugars have been controlled with diet.   The following portions of the patient's history were reviewed and updated as appropriate: allergies, current medications, past family history, past medical history, past social history, past surgical history and problem list.    Review of Systems   Constitutional: Negative for activity change, appetite change, chills, diaphoresis, fatigue, fever and unexpected weight change.   HENT: Negative for congestion, ear discharge, ear pain, mouth sores, nosebleeds, sinus pressure, sneezing and sore throat.    Eyes: Negative for pain, discharge and itching.   Respiratory: Negative for cough, chest tightness, shortness of breath and wheezing.    Cardiovascular: Negative for chest pain, palpitations and leg swelling.   Gastrointestinal: Negative for abdominal pain, constipation, diarrhea, nausea and vomiting.   Endocrine: Negative for cold intolerance, heat intolerance, polydipsia and polyphagia.   Genitourinary: Negative for dysuria, flank pain, frequency, hematuria and urgency.   Musculoskeletal: Negative for arthralgias, back pain, gait problem, myalgias, neck pain and neck stiffness.   Skin: Negative for color change, pallor and rash.   Neurological: Negative for seizures, speech difficulty, numbness and headaches.   Psychiatric/Behavioral: Negative for agitation, confusion, decreased concentration and sleep disturbance. The patient is not nervous/anxious.    /70 (BP Location: Left arm, Patient Position: Sitting, Cuff Size: Large Adult)   Pulse 110   Temp 98.5 °F (36.9 °C) (Oral)    Resp 20   Wt 97.2 kg (214 lb 4 oz)   SpO2 96%   BMI 36.78 kg/m²       Objective   Physical Exam   Constitutional: She appears well-developed.   HENT:   Head: Normocephalic.   Right Ear: External ear normal.   Left Ear: External ear normal.   Nose: Nose normal.   Mouth/Throat: Oropharynx is clear and moist.   Eyes: Pupils are equal, round, and reactive to light. Conjunctivae are normal.   Neck: No JVD present. No thyromegaly present.   Cardiovascular: Normal rate, regular rhythm and normal heart sounds.  Exam reveals no friction rub.    No murmur heard.  Pulmonary/Chest: Effort normal and breath sounds normal. No respiratory distress. She has no wheezes. She has no rales.   Abdominal: Soft. Bowel sounds are normal. She exhibits no distension. There is no tenderness. There is no guarding.   Musculoskeletal: She exhibits no edema or tenderness.   Lymphadenopathy:     She has no cervical adenopathy.   Neurological: She displays normal reflexes. No cranial nerve deficit.   Skin: No rash noted.   Psychiatric: Her behavior is normal.   Nursing note and vitals reviewed.      Assessment/Plan   Mirna was seen today for heartburn and hypertension.    Diagnoses and all orders for this visit:    Benign essential hypertension  Increase losartan hctz 100/25 po qd  Prediabetes  A1c is   Erythrocytosis  Evaluated by heme.   Elevated liver enzymes  Secondary to fatty liver. Working on weight loss and has lost 7 pounds

## 2018-11-08 ENCOUNTER — CONSULT (OUTPATIENT)
Dept: SLEEP MEDICINE | Facility: HOSPITAL | Age: 58
End: 2018-11-08

## 2018-11-08 VITALS
SYSTOLIC BLOOD PRESSURE: 144 MMHG | BODY MASS INDEX: 36.57 KG/M2 | WEIGHT: 214.2 LBS | HEART RATE: 98 BPM | OXYGEN SATURATION: 97 % | DIASTOLIC BLOOD PRESSURE: 74 MMHG | HEIGHT: 64 IN

## 2018-11-08 DIAGNOSIS — R06.83 SNORING: ICD-10-CM

## 2018-11-08 DIAGNOSIS — I10 ESSENTIAL HYPERTENSION: ICD-10-CM

## 2018-11-08 DIAGNOSIS — G47.19 EXCESSIVE DAYTIME SLEEPINESS: ICD-10-CM

## 2018-11-08 DIAGNOSIS — R29.818 SUSPECTED SLEEP APNEA: Primary | ICD-10-CM

## 2018-11-08 PROCEDURE — 99243 OFF/OP CNSLTJ NEW/EST LOW 30: CPT | Performed by: NURSE PRACTITIONER

## 2018-11-08 NOTE — PATIENT INSTRUCTIONS
Hypersomnia  Hypersomnia is when you feel extremely tired during the day even though you're getting plenty of sleep at night. You may need to take naps during the day, and you may also be extremely difficult to wake up when you are sleeping.  What are the causes?  The cause of your hypersomnia may not be known. Hypersomnia may be caused by:  · Medicines.  · Sleep disorders, such as narcolepsy.  · Trauma or injury to your head or nervous system.  · Using drugs or alcohol.  · Tumors.  · Medical conditions, such as depression or hypothyroidism.  · Genetics.    What are the signs or symptoms?  The main symptoms of hypersomnia include:  · Feeling extremely tired throughout the day.  · Being very difficult to wake up.  · Sleeping for longer and longer periods.  · Taking naps throughout the day.    Other symptoms may include:  · Feeling:  ? Restless.  ? Annoyed.  ? Anxious.  ? Low energy.  · Having difficulty:  ? Remembering.  ? Speaking.  ? Thinking.  · Losing your appetite.  · Experiencing hallucinations.    How is this diagnosed?  Hypersomnia may be diagnosed by:  · Medical history and physical exam. This will include a sleep history.  · Completing sleep logs.  · Tests may also be done, such as:  ? Polysomnography.  ? Multiple sleep latency test (MSLT).    How is this treated?  There is no cure for hypersomnia, but treatment can be very effective in helping manage the condition. Treatment may include:  · Lifestyle and sleeping strategies to help cope with the condition.  · Stimulant medicines.  · Treating any underlying causes of hypersomnia.    Follow these instructions at home:  · Take medicines only as directed by your health care provider.  · Schedule short naps for when you feel sleepiest during the day. Tell your employer or teachers that you have hypersomnia. You may be able to adjust your schedule to include time for naps.  · Avoid drinking alcohol or caffeinated beverages.  · Do not eat a heavy meal before  bedtime. Eat at about the same times every day.  · Do not drive or operate heavy machinery if you are sleepy.  · Do not swim or go out on the water without a life jacket.  · If possible, adjust your schedule so that you do not have to work or be active at night.  · Keep all follow-up visits as directed by your health care provider. This is important.  Contact a health care provider if:  · You have new symptoms.  · Your symptoms get worse.  Get help right away if:  You have serious thoughts of hurting yourself or someone else.  This information is not intended to replace advice given to you by your health care provider. Make sure you discuss any questions you have with your health care provider.  Document Released: 12/08/2003 Document Revised: 05/25/2017 Document Reviewed: 07/23/2015  [employer] Patient Education © 2018 [employer]  Sleep Apnea  Sleep apnea is a condition that affects breathing. People with sleep apnea have moments during sleep when their breathing pauses briefly or gets shallow. Sleep apnea can cause these symptoms:  · Trouble staying asleep.  · Sleepiness or tiredness during the day.  · Irritability.  · Loud snoring.  · Morning headaches.  · Trouble concentrating.  · Forgetting things.  · Less interest in sex.  · Being sleepy for no reason.  · Mood swings.  · Personality changes.  · Depression.  · Waking up a lot during the night to pee (urinate).  · Dry mouth.  · Sore throat.    Follow these instructions at home:  · Make any changes in your routine that your doctor recommends.  · Eat a healthy, well-balanced diet.  · Take over-the-counter and prescription medicines only as told by your doctor.  · Avoid using alcohol, calming medicines (sedatives), and narcotic medicines.  · Take steps to lose weight if you are overweight.  · If you were given a machine (device) to use while you sleep, use it only as told by your doctor.  · Do not use any tobacco products, such as cigarettes, chewing  tobacco, and e-cigarettes. If you need help quitting, ask your doctor.  · Keep all follow-up visits as told by your doctor. This is important.  Contact a doctor if:  · The machine that you were given to use during sleep is uncomfortable or does not seem to be working.  · Your symptoms do not get better.  · Your symptoms get worse.  Get help right away if:  · Your chest hurts.  · You have trouble breathing in enough air (shortness of breath).  · You have an uncomfortable feeling in your back, arms, or stomach.  · You have trouble talking.  · One side of your body feels weak.  · A part of your face is hanging down (drooping).  These symptoms may be an emergency. Do not wait to see if the symptoms will go away. Get medical help right away. Call your local emergency services (911 in the [place] Do not drive yourself to the hospital.  This information is not intended to replace advice given to you by your health care provider. Make sure you discuss any questions you have with your health care provider.  Document Released: 09/26/2009 Document Revised: 08/13/2017 Document Reviewed: 09/26/2016  [employer] Interactive Patient Education © 2018 [employer]

## 2018-11-08 NOTE — PROGRESS NOTES
Subjective: Sleeping Problem        Chief Complaint:   Chief Complaint   Patient presents with   • Sleeping Problem       HPI:    Mirna Saavedra is a 58 y.o. female here for consult at the request of Dr. Chiquita Acosta.  Patient has a history of hypertension, migraines, acid reflux, and prediabetes.  She has a 30+ year history of excessive daytime sleepiness.  She sleeps approximately 6-7 hours nightly and does not wake up feeling refreshed.  She has loud snoring in all positions.  She denies gasping for breath in the night and has been denies witnessed apneas.  She has no trouble breathing through her nose and no morning headaches.  Patient does have history however of migraines.  Patient goes to bed very times each evening during the week is usually 12 AM to 1 AM and has a very restless night and ends up getting up around 10 or 11 AM during the weekend it varies severely on what time she will go to bed but usually stays up late.  Think she gets 6-7 hours of good sleep.  Awakens 2-3 times nightly and sometimes have trouble going back to sleep.  Initially when getting bed she does asleep within 10-20 minutes.  She does awaken feeling very fatigued and sleepy.  Patient does have reflux and this is controlled by medication.  She eats 2-3 meals a day and does not exercise.  She denies hypnagogic hallucinations, sleep paralysis, and cataplexy.  She does awaken at times in the night due to shoulder and hip pain.    Patient had several concussions as a child.  She has not had any near miss accidents or falling asleep at the wheel.  Her Emery score is 3/24.    Social history  58-year-old  female who is .  She smoked 1 pack a day for 4 years but did stop smoking in 1979.  The only liquid she consumes is water.  She did smoke marijuana in the past but stopped use in 1978.     Family History   Problem Relation Age of Onset   • Breast cancer Other    • Migraines Other    • Scoliosis Other         amyotrophic  lateral sclerosis    • Tuberculosis Other    • Breast cancer Mother    • Breast cancer Sister    • ALS Sister    • COPD Father        Past Surgical History:   Procedure Laterality Date   • BREAST BIOPSY Right 2010   • BREAST LUMPECTOMY Left 2010   • COLONOSCOPY  2008   • EXPLORATORY LAPAROTOMY  1993   • HAND SURGERY Left 1987    torn ligament repair   • HYSTERECTOMY  2006   • HYSTEROSCOPY     • HYSTEROSCOPY  1993   • SKIN BIOPSY      multiple     Past Medical History:   Diagnosis Date   • Elevated ferritin    • Elevated liver enzymes    • Fatigue    • Hair loss    • Hypertension    • Mitral valve prolapse    • Ovarian cyst    • Peptic ulceration        Current medications are:   Current Outpatient Prescriptions:   •  ALPRAZolam (XANAX) 0.5 MG tablet, Take 0.5 mg by mouth As Needed for Anxiety., Disp: , Rfl:   •  famotidine (PEPCID) 20 MG tablet, Take 1 tablet by mouth 2 (Two) Times a Day., Disp: 20 tablet, Rfl: 0  •  losartan-hydrochlorothiazide (HYZAAR) 100-25 MG per tablet, Take 1 tablet by mouth Daily., Disp: 30 tablet, Rfl: 5  •  omeprazole (priLOSEC) 20 MG capsule, Take 20 mg by mouth Daily., Disp: , Rfl: .      The patient's relevant past medical, surgical, family and social history were reviewed and updated in Epic as appropriate.       Review of Systems   Constitutional: Positive for fatigue.   Eyes: Positive for visual disturbance.   Respiratory: Positive for cough.    Musculoskeletal: Positive for back pain and myalgias.   Neurological: Positive for weakness.   Psychiatric/Behavioral: Positive for sleep disturbance. The patient is nervous/anxious.    All other systems reviewed and are negative.        Objective:    Physical Exam   Constitutional: She is oriented to person, place, and time. She appears well-developed and well-nourished.   Morbidly obese female   HENT:   Head: Normocephalic and atraumatic.   Mouth/Throat: Oropharynx is clear and moist.   Mallampati 2 anatomy   Eyes: Conjunctivae are normal.    Neck: Neck supple. No thyromegaly present.   Cardiovascular: Normal rate and regular rhythm.    Pulmonary/Chest: Effort normal and breath sounds normal.   Lymphadenopathy:     She has no cervical adenopathy.   Neurological: She is alert and oriented to person, place, and time.   Psychiatric: She has a normal mood and affect. Her behavior is normal. Judgment and thought content normal.   Nursing note and vitals reviewed.        ASSESSMENT/PLAN    Mirna was seen today for sleeping problem.    Diagnoses and all orders for this visit:    Suspected sleep apnea  -     Home Sleep Study; Future    Excessive daytime sleepiness  -     Home Sleep Study; Future    Snoring  -     Home Sleep Study; Future    Essential hypertension  -     Home Sleep Study; Future            1. Counseled patient regarding multimodal approach with healthy nutrition, healthy sleep, regular physical activity, social activities, counseling, and medications. Encouraged to practice lateral sleep position. Avoid alcohol and sedatives close to bedtime.  2.   Encouraged to start weight loss program  Patient presents with a history as noted above.  She has disrupted sleep.  This could be related to sleep-disordered breathing.  We will need 4 with a home sleep study.  We've discussed possible therapies for sleep-disordered breathing including CPAP, weight control, oral appliances, and surgery.  We have also discussed the long-term consequences of untreated obstructive sleep apnea.  I will see her back after her test.  I have reviewed the results of my evaluation and impression and discussed my recommendations in detail with the patient.  Thank you Dr. Acosta for this kind referral.    Signed by  GREG Tuttle    November 8, 2018      CC: Chiquita Acosta DO Roberts, Shannon A, DO

## 2018-12-10 ENCOUNTER — HOSPITAL ENCOUNTER (OUTPATIENT)
Dept: SLEEP MEDICINE | Facility: HOSPITAL | Age: 58
Discharge: HOME | End: 2018-12-10
Admitting: NURSE PRACTITIONER

## 2018-12-10 VITALS
SYSTOLIC BLOOD PRESSURE: 175 MMHG | WEIGHT: 210.6 LBS | OXYGEN SATURATION: 96 % | BODY MASS INDEX: 35.96 KG/M2 | HEIGHT: 64 IN | DIASTOLIC BLOOD PRESSURE: 87 MMHG | HEART RATE: 100 BPM

## 2018-12-10 DIAGNOSIS — R29.818 SUSPECTED SLEEP APNEA: ICD-10-CM

## 2018-12-10 DIAGNOSIS — I10 ESSENTIAL HYPERTENSION: ICD-10-CM

## 2018-12-10 DIAGNOSIS — G47.19 EXCESSIVE DAYTIME SLEEPINESS: ICD-10-CM

## 2018-12-10 DIAGNOSIS — R06.83 SNORING: ICD-10-CM

## 2018-12-10 PROCEDURE — 95806 SLEEP STUDY UNATT&RESP EFFT: CPT

## 2018-12-10 PROCEDURE — 95806 SLEEP STUDY UNATT&RESP EFFT: CPT | Performed by: INTERNAL MEDICINE

## 2018-12-11 DIAGNOSIS — G47.33 OBSTRUCTIVE SLEEP APNEA, ADULT: Primary | ICD-10-CM

## 2018-12-11 DIAGNOSIS — I10 BENIGN ESSENTIAL HYPERTENSION: ICD-10-CM

## 2018-12-11 DIAGNOSIS — R06.83 SNORING: ICD-10-CM

## 2018-12-17 ENCOUNTER — OFFICE VISIT (OUTPATIENT)
Dept: SLEEP MEDICINE | Facility: HOSPITAL | Age: 58
End: 2018-12-17

## 2018-12-17 VITALS
OXYGEN SATURATION: 95 % | HEART RATE: 97 BPM | WEIGHT: 207.6 LBS | HEIGHT: 64 IN | DIASTOLIC BLOOD PRESSURE: 85 MMHG | SYSTOLIC BLOOD PRESSURE: 142 MMHG | BODY MASS INDEX: 35.44 KG/M2

## 2018-12-17 DIAGNOSIS — G47.33 OBSTRUCTIVE SLEEP APNEA, ADULT: Primary | ICD-10-CM

## 2018-12-17 PROCEDURE — 99213 OFFICE O/P EST LOW 20 MIN: CPT | Performed by: NURSE PRACTITIONER

## 2018-12-17 NOTE — PATIENT INSTRUCTIONS

## 2018-12-17 NOTE — PROGRESS NOTES
Subjective:   Follow-up      Chief Complaint:   Chief Complaint   Patient presents with   • Follow-up       HPI:    Mirna Saavedra is a 58 y.o. female here for follow-up of study results.  Patient was seen here in consult 11/8/18 for hypertension excessive sleepiness and snoring.  She did have a test on 12/11/18 that showed moderate obstructive sleep apnea that did increase when supine to a severe obstructive sleep apnea.  Patient did have 3 minutes of oxygen desaturation.  She does understand the consequences of untreated obstructive sleep apnea and wishes to initiate therapy.        Current medications are:   Current Outpatient Medications:   •  ALPRAZolam (XANAX) 0.5 MG tablet, Take 0.5 mg by mouth As Needed for Anxiety., Disp: , Rfl:   •  losartan-hydrochlorothiazide (HYZAAR) 100-25 MG per tablet, Take 1 tablet by mouth Daily., Disp: 30 tablet, Rfl: 5  •  omeprazole (priLOSEC) 20 MG capsule, Take 20 mg by mouth Daily., Disp: , Rfl:   •  famotidine (PEPCID) 20 MG tablet, Take 1 tablet by mouth 2 (Two) Times a Day., Disp: 20 tablet, Rfl: 0.      The patient's relevant past medical, surgical, family and social history were reviewed and updated in Epic as appropriate.       Review of Systems   Constitutional: Positive for fatigue.   Eyes: Positive for visual disturbance.   Respiratory: Positive for apnea and cough.    Musculoskeletal: Positive for back pain and myalgias.   Psychiatric/Behavioral: The patient is nervous/anxious.    All other systems reviewed and are negative.        Objective:    Physical Exam   Constitutional: She is oriented to person, place, and time. She appears well-developed and well-nourished.   HENT:   Head: Normocephalic and atraumatic.   Mouth/Throat: Oropharynx is clear and moist.   Mallampati 2 anatomy   Eyes: Conjunctivae are normal.   Neck: Neck supple. No thyromegaly present.   Cardiovascular: Normal rate and regular rhythm.   Pulmonary/Chest: Effort normal and breath sounds normal.    Lymphadenopathy:     She has no cervical adenopathy.   Neurological: She is alert and oriented to person, place, and time.   Skin: Skin is warm and dry.   Psychiatric: She has a normal mood and affect. Her behavior is normal. Judgment and thought content normal.   Nursing note and vitals reviewed.        ASSESSMENT/PLAN    Mirna was seen today for follow-up.    Diagnoses and all orders for this visit:    Obstructive sleep apnea, adult            1. Counseled patient regarding multimodal approach with healthy nutrition, healthy sleep, regular physical activity, social activities, counseling, and medications. Encouraged to practice lateral sleep position. Avoid alcohol and sedatives close to bedtime.  2. Patient would like to call back tomorrow to let us know what DME she would like to use.  I will then fax an order for CPAP initiation.  I will see her back in 31-90 days.    I have reviewed the results of my evaluation and impression and discussed my recommendations in detail with the patient.      Signed by  GREG Tuttle    December 17, 2018      CC: Chiquita Aocsta DO          No ref. provider found

## 2019-01-25 ENCOUNTER — TELEPHONE (OUTPATIENT)
Dept: SLEEP MEDICINE | Facility: HOSPITAL | Age: 59
End: 2019-01-25

## 2019-01-25 DIAGNOSIS — G47.33 OSA ON CPAP: Primary | ICD-10-CM

## 2019-01-25 DIAGNOSIS — Z99.89 OSA ON CPAP: Primary | ICD-10-CM

## 2019-01-25 NOTE — TELEPHONE ENCOUNTER
ROXANN FROM [facility] CALLED AND STATES PT HAS REQUESTED PRESSURE INCREASE FROM CURRENT TO 9-10. PLEASE CALL ROXANN [phone] WITH ANY QUESTIONS

## 2019-01-25 NOTE — TELEPHONE ENCOUNTER
Upon verbal order by Dr. Shah, the pressure was increased to Min CPAP of 10 cmH2O.  I made this change in [employer] [facility] already but will pend an order in Epic so that you can fax it to [facility] so that we all have paper trail of changes made.  Thanks

## 2019-01-28 NOTE — TELEPHONE ENCOUNTER
CALLED PATIENT AND ADVISED OF CHANGE. PATIENT VERBALIZED UNDERSTANDING. ONCE ORDER IS SIGNED WILL FAX TO [facility]    01/28/19 [facility]

## 2019-02-18 ENCOUNTER — OFFICE VISIT (OUTPATIENT)
Dept: SLEEP MEDICINE | Facility: HOSPITAL | Age: 59
End: 2019-02-18

## 2019-02-18 VITALS
HEIGHT: 64 IN | OXYGEN SATURATION: 96 % | SYSTOLIC BLOOD PRESSURE: 146 MMHG | HEART RATE: 92 BPM | WEIGHT: 206 LBS | BODY MASS INDEX: 35.17 KG/M2 | DIASTOLIC BLOOD PRESSURE: 67 MMHG

## 2019-02-18 DIAGNOSIS — G47.33 OSA (OBSTRUCTIVE SLEEP APNEA): Primary | ICD-10-CM

## 2019-02-18 PROCEDURE — 99213 OFFICE O/P EST LOW 20 MIN: CPT | Performed by: NURSE PRACTITIONER

## 2019-02-18 NOTE — PROGRESS NOTES
Subjective: Follow-up        Chief Complaint:   Chief Complaint   Patient presents with   • Follow-up       HPI:    Mirna Saavedra is a 58 y.o. female here for follow-up of bobby.  She was last seen here 12/17/18 for moderate obstructive sleep apnea.  She did initiate CPAP therapy.  Patient states she is doing very well.  She is sleeping 7 hours nightly and feels refreshed upon awakening.  Her Jones score is 4/24.  Patient did call on January 25 and as per her pressure to be increased to 10 and this is been done.  Patient is doing much better since this change has been made.  Patient has no concerns or complaints and wishes to continue CPAP therapy.        Current medications are:   Current Outpatient Medications:   •  ALPRAZolam (XANAX) 0.5 MG tablet, Take 0.5 mg by mouth As Needed for Anxiety., Disp: , Rfl:   •  losartan-hydrochlorothiazide (HYZAAR) 100-25 MG per tablet, Take 1 tablet by mouth Daily., Disp: 30 tablet, Rfl: 5  •  omeprazole (priLOSEC) 20 MG capsule, Take 20 mg by mouth Daily., Disp: , Rfl:   •  famotidine (PEPCID) 20 MG tablet, Take 1 tablet by mouth 2 (Two) Times a Day., Disp: 20 tablet, Rfl: 0.      The patient's relevant past medical, surgical, family and social history were reviewed and updated in Epic as appropriate.       Review of Systems   Eyes: Positive for visual disturbance.   Respiratory: Positive for apnea and cough.    Musculoskeletal: Positive for back pain and myalgias.   Psychiatric/Behavioral: Positive for sleep disturbance. The patient is nervous/anxious.    All other systems reviewed and are negative.        Objective:    Physical Exam   Constitutional: She is oriented to person, place, and time. She appears well-developed and well-nourished.   HENT:   Head: Normocephalic and atraumatic.   Mouth/Throat: Oropharynx is clear and moist.   Mallampati 2 anatomy   Eyes: Conjunctivae are normal.   Neck: Neck supple. No thyromegaly present.   Cardiovascular: Normal rate and regular  rhythm.   Pulmonary/Chest: Effort normal and breath sounds normal.   Lymphadenopathy:     She has no cervical adenopathy.   Neurological: She is alert and oriented to person, place, and time.   Skin: Skin is warm and dry.   Psychiatric: She has a normal mood and affect. Her behavior is normal. Judgment and thought content normal.   Nursing note and vitals reviewed.  34/36  days of use.  Greater than 4 hour use 86.1%.  Pressure 11.1 cm H2O.  Patient will go.     ESSMENT/PLAN    Mirna was seen today for follow-up.    Diagnoses and all orders for this visit:    LAYTON (obstructive sleep apnea)  -     CPAP Therapy            1. Counseled patient regarding multimodal approach with healthy nutrition, healthy sleep, regular physical activity, social activities, counseling, and medications. Encouraged to practice lateral sleep position. Avoid alcohol and sedatives close to bedtime.  2. Return to clinic one year or sooner symptoms warrant.  Supplies refill ×1 year.    I have reviewed the results of my evaluation and impression and discussed my recommendations in detail with the patient.      Signed by  GREG Tuttle    February 18, 2019      CC: Chiquita Acosta DO          No ref. provider found

## 2019-02-18 NOTE — PATIENT INSTRUCTIONS

## 2019-02-20 ENCOUNTER — OFFICE VISIT (OUTPATIENT)
Dept: INTERNAL MEDICINE | Facility: CLINIC | Age: 59
End: 2019-02-20

## 2019-02-20 VITALS
SYSTOLIC BLOOD PRESSURE: 140 MMHG | BODY MASS INDEX: 35.5 KG/M2 | OXYGEN SATURATION: 99 % | HEART RATE: 89 BPM | WEIGHT: 206.8 LBS | DIASTOLIC BLOOD PRESSURE: 90 MMHG

## 2019-02-20 DIAGNOSIS — R74.8 ELEVATED LIVER ENZYMES: ICD-10-CM

## 2019-02-20 DIAGNOSIS — I10 BENIGN ESSENTIAL HYPERTENSION: Primary | ICD-10-CM

## 2019-02-20 DIAGNOSIS — R73.03 PREDIABETES: ICD-10-CM

## 2019-02-20 DIAGNOSIS — K21.9 GASTROESOPHAGEAL REFLUX DISEASE, ESOPHAGITIS PRESENCE NOT SPECIFIED: ICD-10-CM

## 2019-02-20 LAB
GLUCOSE BLDC GLUCOMTR-MCNC: 125 MG/DL (ref 70–130)
HBA1C MFR BLD: 5.3 %

## 2019-02-20 PROCEDURE — 82947 ASSAY GLUCOSE BLOOD QUANT: CPT | Performed by: INTERNAL MEDICINE

## 2019-02-20 PROCEDURE — 99214 OFFICE O/P EST MOD 30 MIN: CPT | Performed by: INTERNAL MEDICINE

## 2019-02-20 PROCEDURE — 83036 HEMOGLOBIN GLYCOSYLATED A1C: CPT | Performed by: INTERNAL MEDICINE

## 2019-02-20 RX ORDER — NEBIVOLOL 5 MG/1
5 TABLET ORAL DAILY
Qty: 30 TABLET | Refills: 2 | Status: SHIPPED | OUTPATIENT
Start: 2019-02-20 | End: 2019-03-01 | Stop reason: ALTCHOICE

## 2019-02-20 RX ORDER — LOSARTAN POTASSIUM AND HYDROCHLOROTHIAZIDE 25; 100 MG/1; MG/1
1 TABLET ORAL DAILY
Qty: 30 TABLET | Refills: 5 | Status: SHIPPED | OUTPATIENT
Start: 2019-02-20 | End: 2019-07-12 | Stop reason: SDUPTHER

## 2019-02-20 NOTE — PROGRESS NOTES
Subjective   Mirna Saavedra is a 58 y.o. female.   Chief Complaint   Patient presents with   • Heartburn     Follow Up   • Hypertension   • Prediabetes     LAST A1C 5.6       Hypertension   Pertinent negatives include no chest pain, headaches, neck pain, palpitations or shortness of breath.   Heartburn   She reports no abdominal pain, no chest pain, no coughing, no nausea, no sore throat or no wheezing. Pertinent negatives include no fatigue.      Here for f/u on htn, prediabtes, erythrocytosis, and elevated liver enzymes. Heartburn is table with PPI.  Sugars have been controlled with diet.   The following portions of the patient's history were reviewed and updated as appropriate: allergies, current medications, past family history, past medical history, past social history, past surgical history and problem list.    Review of Systems   Constitutional: Negative for activity change, appetite change, chills, diaphoresis, fatigue, fever and unexpected weight change.   HENT: Negative for congestion, ear discharge, ear pain, mouth sores, nosebleeds, sinus pressure, sneezing and sore throat.    Eyes: Negative for pain, discharge and itching.   Respiratory: Negative for cough, chest tightness, shortness of breath and wheezing.    Cardiovascular: Negative for chest pain, palpitations and leg swelling.   Gastrointestinal: Negative for abdominal pain, constipation, diarrhea, nausea and vomiting.   Endocrine: Negative for cold intolerance, heat intolerance, polydipsia and polyphagia.        HAIR LOSS   Genitourinary: Negative for dysuria, flank pain, frequency, hematuria and urgency.   Musculoskeletal: Negative for arthralgias, back pain, gait problem, myalgias, neck pain and neck stiffness.   Skin: Negative for color change, pallor and rash.   Neurological: Negative for seizures, speech difficulty, numbness and headaches.   Psychiatric/Behavioral: Negative for agitation, confusion, decreased concentration and sleep  disturbance. The patient is not nervous/anxious.    /90   Pulse 89   Wt 93.8 kg (206 lb 12.8 oz)   SpO2 99%   BMI 35.50 kg/m²       Objective   Physical Exam   Constitutional: She appears well-developed.   HENT:   Head: Normocephalic.   Right Ear: External ear normal.   Left Ear: External ear normal.   Nose: Nose normal.   Mouth/Throat: Oropharynx is clear and moist.   Eyes: Conjunctivae are normal. Pupils are equal, round, and reactive to light.   Neck: No JVD present. No thyromegaly present.   Cardiovascular: Normal rate, regular rhythm and normal heart sounds. Exam reveals no friction rub.   No murmur heard.  Pulmonary/Chest: Effort normal and breath sounds normal. No respiratory distress. She has no wheezes. She has no rales.   Abdominal: Soft. Bowel sounds are normal. She exhibits no distension. There is no tenderness. There is no guarding.   Musculoskeletal: She exhibits no edema or tenderness.   Lymphadenopathy:     She has no cervical adenopathy.   Neurological: She displays normal reflexes. No cranial nerve deficit.   Skin: No rash noted.   Psychiatric: Her behavior is normal.   Nursing note and vitals reviewed.      Assessment/Plan   Mirna was seen today for heartburn and hypertension.    Diagnoses and all orders for this visit:    Benign essential hypertension  add bystolic 5 mg po qd  Prediabetes  A1c is 5.3    Elevated liver enzymes  Secondary to fatty liver. Working on weight loss and has lost 7 pounds  GERD  Stable but dc prilosec and change to dexilant to see if hair loss is related prilosec. Will check tsh in 3 weeks at [facility]

## 2019-02-26 ENCOUNTER — PRIOR AUTHORIZATION (OUTPATIENT)
Dept: ENDOCRINOLOGY | Facility: CLINIC | Age: 59
End: 2019-02-26

## 2019-02-26 RX ORDER — LOSARTAN POTASSIUM AND HYDROCHLOROTHIAZIDE 25; 100 MG/1; MG/1
TABLET ORAL
Qty: 30 TABLET | Refills: 4 | OUTPATIENT
Start: 2019-02-26

## 2019-02-28 ENCOUNTER — TELEPHONE (OUTPATIENT)
Dept: INTERNAL MEDICINE | Facility: CLINIC | Age: 59
End: 2019-02-28

## 2019-02-28 DIAGNOSIS — I10 BENIGN ESSENTIAL HYPERTENSION: Primary | ICD-10-CM

## 2019-03-01 RX ORDER — METOPROLOL TARTRATE 50 MG/1
50 TABLET, FILM COATED ORAL DAILY
Qty: 30 TABLET | Refills: 2 | Status: SHIPPED | OUTPATIENT
Start: 2019-03-01 | End: 2020-08-06 | Stop reason: SINTOL

## 2019-03-01 NOTE — TELEPHONE ENCOUNTER
lvm for pt that her bystolic needed a PA and it was denied and we sent over to her pharmacy one of the preferred medications

## 2019-03-07 ENCOUNTER — TELEPHONE (OUTPATIENT)
Dept: INTERNAL MEDICINE | Facility: CLINIC | Age: 59
End: 2019-03-07

## 2019-03-07 DIAGNOSIS — I10 BENIGN ESSENTIAL HYPERTENSION: Primary | ICD-10-CM

## 2019-03-07 NOTE — TELEPHONE ENCOUNTER
Pt's insurance will not pay for bystolic, the preferred is acebutolol, atenolol, betaxolol, bisoprolol, labetolol, metoprolol tartrate, metoprolol succinate ER, nadolol, pindolol, propranolol/ER or timolol

## 2019-03-08 RX ORDER — AMLODIPINE BESYLATE 5 MG/1
5 TABLET ORAL DAILY
Qty: 30 TABLET | Refills: 1 | Status: SHIPPED | OUTPATIENT
Start: 2019-03-08 | End: 2019-07-11 | Stop reason: SINTOL

## 2019-04-11 ENCOUNTER — TRANSCRIBE ORDERS (OUTPATIENT)
Dept: INTERNAL MEDICINE | Facility: CLINIC | Age: 59
End: 2019-04-11

## 2019-04-11 DIAGNOSIS — Z12.31 VISIT FOR SCREENING MAMMOGRAM: Primary | ICD-10-CM

## 2019-06-12 ENCOUNTER — APPOINTMENT (OUTPATIENT)
Dept: MAMMOGRAPHY | Facility: HOSPITAL | Age: 59
End: 2019-06-12

## 2019-07-11 ENCOUNTER — OFFICE VISIT (OUTPATIENT)
Dept: INTERNAL MEDICINE | Facility: CLINIC | Age: 59
End: 2019-07-11

## 2019-07-11 VITALS
SYSTOLIC BLOOD PRESSURE: 120 MMHG | DIASTOLIC BLOOD PRESSURE: 90 MMHG | BODY MASS INDEX: 34.12 KG/M2 | HEART RATE: 94 BPM | HEIGHT: 65 IN | WEIGHT: 204.8 LBS | OXYGEN SATURATION: 98 %

## 2019-07-11 DIAGNOSIS — Z00.00 HEALTHCARE MAINTENANCE: Primary | ICD-10-CM

## 2019-07-11 DIAGNOSIS — R73.03 PREDIABETES: ICD-10-CM

## 2019-07-11 LAB
ALBUMIN SERPL-MCNC: 4.3 G/DL (ref 3.5–5.2)
ALBUMIN/GLOB SERPL: 1.3 G/DL
ALP SERPL-CCNC: 76 U/L (ref 39–117)
ALT SERPL W P-5'-P-CCNC: 30 U/L (ref 1–33)
ANION GAP SERPL CALCULATED.3IONS-SCNC: 14.3 MMOL/L (ref 5–15)
AST SERPL-CCNC: 32 U/L (ref 1–32)
BASOPHILS # BLD AUTO: 0.04 10*3/MM3 (ref 0–0.2)
BASOPHILS NFR BLD AUTO: 0.7 % (ref 0–1.5)
BILIRUB BLD-MCNC: NEGATIVE MG/DL
BILIRUB SERPL-MCNC: 0.8 MG/DL (ref 0.2–1.2)
BUN BLD-MCNC: 7 MG/DL (ref 8–23)
BUN/CREAT SERPL: 14 (ref 7–25)
CALCIUM SPEC-SCNC: 9.8 MG/DL (ref 8.6–10.5)
CHLORIDE SERPL-SCNC: 95 MMOL/L (ref 98–107)
CHOLEST SERPL-MCNC: 180 MG/DL (ref 0–200)
CLARITY, POC: CLEAR
CO2 SERPL-SCNC: 22.7 MMOL/L (ref 22–29)
COLOR UR: YELLOW
CREAT BLD-MCNC: 0.5 MG/DL (ref 0.57–1)
DEPRECATED RDW RBC AUTO: 50.5 FL (ref 37–54)
EOSINOPHIL # BLD AUTO: 0.11 10*3/MM3 (ref 0–0.4)
EOSINOPHIL NFR BLD AUTO: 1.8 % (ref 0.3–6.2)
ERYTHROCYTE [DISTWIDTH] IN BLOOD BY AUTOMATED COUNT: 13.2 % (ref 12.3–15.4)
GFR SERPL CREATININE-BSD FRML MDRD: 126 ML/MIN/1.73
GLOBULIN UR ELPH-MCNC: 3.2 GM/DL
GLUCOSE BLD-MCNC: 116 MG/DL (ref 65–99)
GLUCOSE BLDC GLUCOMTR-MCNC: 125 MG/DL (ref 70–130)
GLUCOSE UR STRIP-MCNC: NEGATIVE MG/DL
HBA1C MFR BLD: 5.1 %
HCT VFR BLD AUTO: 43.5 % (ref 34–46.6)
HDLC SERPL-MCNC: 72 MG/DL (ref 40–60)
HGB BLD-MCNC: 14.1 G/DL (ref 12–15.9)
IMM GRANULOCYTES # BLD AUTO: 0.02 10*3/MM3 (ref 0–0.05)
IMM GRANULOCYTES NFR BLD AUTO: 0.3 % (ref 0–0.5)
KETONES UR QL: NEGATIVE
LDLC SERPL CALC-MCNC: 90 MG/DL (ref 0–100)
LDLC/HDLC SERPL: 1.25 {RATIO}
LEUKOCYTE EST, POC: NEGATIVE
LYMPHOCYTES # BLD AUTO: 1.83 10*3/MM3 (ref 0.7–3.1)
LYMPHOCYTES NFR BLD AUTO: 30.1 % (ref 19.6–45.3)
MCH RBC QN AUTO: 33.5 PG (ref 26.6–33)
MCHC RBC AUTO-ENTMCNC: 32.4 G/DL (ref 31.5–35.7)
MCV RBC AUTO: 103.3 FL (ref 79–97)
MONOCYTES # BLD AUTO: 0.57 10*3/MM3 (ref 0.1–0.9)
MONOCYTES NFR BLD AUTO: 9.4 % (ref 5–12)
NEUTROPHILS # BLD AUTO: 3.5 10*3/MM3 (ref 1.7–7)
NEUTROPHILS NFR BLD AUTO: 57.7 % (ref 42.7–76)
NITRITE UR-MCNC: NEGATIVE MG/ML
NRBC BLD AUTO-RTO: 0 /100 WBC (ref 0–0.2)
PH UR: 7 [PH] (ref 5–8)
PLATELET # BLD AUTO: 173 10*3/MM3 (ref 140–450)
PMV BLD AUTO: 12.2 FL (ref 6–12)
POTASSIUM BLD-SCNC: 4 MMOL/L (ref 3.5–5.2)
PROT SERPL-MCNC: 7.5 G/DL (ref 6–8.5)
PROT UR STRIP-MCNC: NEGATIVE MG/DL
RBC # BLD AUTO: 4.21 10*6/MM3 (ref 3.77–5.28)
RBC # UR STRIP: NEGATIVE /UL
SODIUM BLD-SCNC: 132 MMOL/L (ref 136–145)
SP GR UR: 1 (ref 1–1.03)
TRIGL SERPL-MCNC: 89 MG/DL (ref 0–150)
TSH SERPL DL<=0.05 MIU/L-ACNC: 4.46 MIU/ML (ref 0.27–4.2)
UROBILINOGEN UR QL: NORMAL
VLDLC SERPL-MCNC: 17.8 MG/DL (ref 5–40)
WBC NRBC COR # BLD: 6.07 10*3/MM3 (ref 3.4–10.8)

## 2019-07-11 PROCEDURE — 83036 HEMOGLOBIN GLYCOSYLATED A1C: CPT | Performed by: INTERNAL MEDICINE

## 2019-07-11 PROCEDURE — 99396 PREV VISIT EST AGE 40-64: CPT | Performed by: INTERNAL MEDICINE

## 2019-07-11 PROCEDURE — 80053 COMPREHEN METABOLIC PANEL: CPT | Performed by: INTERNAL MEDICINE

## 2019-07-11 PROCEDURE — 84443 ASSAY THYROID STIM HORMONE: CPT | Performed by: INTERNAL MEDICINE

## 2019-07-11 PROCEDURE — 80061 LIPID PANEL: CPT | Performed by: INTERNAL MEDICINE

## 2019-07-11 PROCEDURE — 81003 URINALYSIS AUTO W/O SCOPE: CPT | Performed by: INTERNAL MEDICINE

## 2019-07-11 PROCEDURE — 82962 GLUCOSE BLOOD TEST: CPT | Performed by: INTERNAL MEDICINE

## 2019-07-11 PROCEDURE — 85025 COMPLETE CBC W/AUTO DIFF WBC: CPT | Performed by: INTERNAL MEDICINE

## 2019-07-11 NOTE — PROGRESS NOTES
Chief Complaint   Patient presents with   • Annual Exam         Reported Health  Good Yes  FairNo  PoorNo      Dental,Vision,Hearing  Regular dental visitsYes  Vision ProblemsNo  Hearing LossNo      Immunization Status:  Up To Dateyes      Lifestyle  Healthy DietYes  Weight ConcernsYes  Regular ExerciseNo  Tobacco UseNo  Alcohol UseNo  Drug AbuseNo      Screening  Cancer ScreeningYes  Metabolic ScreeningYes  Risk ScreeningYes  Past Medical History:   Diagnosis Date   • Elevated ferritin    • Elevated liver enzymes    • Fatigue    • Hair loss    • Hypertension    • Mitral valve prolapse    • Ovarian cyst    • Peptic ulceration      Past Surgical History:   Procedure Laterality Date   • BREAST BIOPSY Right    • BREAST LUMPECTOMY Left    • COLONOSCOPY     • EXPLORATORY LAPAROTOMY     • HAND SURGERY Left     torn ligament repair   • HYSTERECTOMY     • HYSTEROSCOPY     • HYSTEROSCOPY     • SKIN BIOPSY      multiple     Family History   Problem Relation Age of Onset   • Breast cancer Other    • Migraines Other    • Scoliosis Other         amyotrophic lateral sclerosis    • Tuberculosis Other    • Breast cancer Mother    • Breast cancer Sister    • ALS Sister    • COPD Father      Social History     Socioeconomic History   • Marital status:      Spouse name: Not on file   • Number of children: Not on file   • Years of education: Not on file   • Highest education level: Not on file   Tobacco Use   • Smoking status: Former Smoker     Years: 5.00     Types: Cigarettes     Last attempt to quit:      Years since quittin.5   • Smokeless tobacco: Never Used   Substance and Sexual Activity   • Alcohol use: Yes     Comment: daily glass of wine   • Drug use: Yes     Types: Marijuana     Comment: smoked for 5 years prior to quitting in    • Sexual activity: Yes     Partners: Male     Birth control/protection: None         Review of Systems   Constitutional: Negative for activity  "change, appetite change, chills, diaphoresis, fatigue, fever and unexpected weight change.   HENT: Negative for congestion, ear discharge, ear pain, mouth sores, nosebleeds, sinus pressure, sneezing and sore throat.    Eyes: Negative for pain, discharge and itching.   Respiratory: Negative for cough, chest tightness, shortness of breath and wheezing.    Cardiovascular: Negative for chest pain, palpitations and leg swelling.   Gastrointestinal: Negative for abdominal pain, constipation, diarrhea, nausea and vomiting.   Endocrine: Negative for cold intolerance, heat intolerance, polydipsia and polyphagia.   Genitourinary: Negative for dysuria, flank pain, frequency, hematuria and urgency.   Musculoskeletal: Negative for arthralgias, back pain, gait problem, myalgias, neck pain and neck stiffness.   Skin: Negative for color change, pallor and rash.   Neurological: Negative for seizures, speech difficulty, numbness and headaches.   Psychiatric/Behavioral: Negative for agitation, confusion, decreased concentration and sleep disturbance. The patient is not nervous/anxious.      /90   Pulse 94   Ht 163.8 cm (64.5\")   Wt 92.9 kg (204 lb 12.8 oz)   SpO2 98%   BMI 34.61 kg/m²     Physical Exam   Constitutional: She is oriented to person, place, and time. She appears well-developed.   HENT:   Head: Normocephalic.   Right Ear: External ear normal.   Left Ear: External ear normal.   Nose: Nose normal.   Mouth/Throat: Oropharynx is clear and moist.   Eyes: Conjunctivae are normal. Pupils are equal, round, and reactive to light.   Neck: No JVD present. No thyromegaly present.   Cardiovascular: Normal rate, regular rhythm and normal heart sounds. Exam reveals no friction rub.   No murmur heard.  Pulmonary/Chest: Effort normal and breath sounds normal. No respiratory distress. She has no wheezes. She has no rales.   Abdominal: Soft. Bowel sounds are normal. She exhibits no distension. There is no tenderness. There is no " guarding.   Musculoskeletal: She exhibits no edema or tenderness.   Lymphadenopathy:     She has no cervical adenopathy.   Neurological: She is alert and oriented to person, place, and time. She displays normal reflexes. No cranial nerve deficit.   Skin: No rash noted.   Psychiatric: Her behavior is normal.   Nursing note and vitals reviewed.                Diet and Exercise    Healthy Diet Yes  Adequate DietYes  Poor DietNo  Adequate Exercise RegimenNo  Inadequate Exercise RegimenYes      Cervical Cancer Screening    No longer does. Hx EDEN    Breast Cancer screening  Risks and Benefits DiscussedYes  Self Breast Exam taughtNo  Monthly Self Exam AdvisedYes  Screening Currentno  Mammogram Orderedyes  Screening Not IndicatedNo  Screening Managed By GYNYes  Patient DeclinesNo  Did not go when ordered at last visit  Because of a back injury, she has it [facility] for August 11    STD Testing  ChlamydiaNo  GonorrheaNo  HIVNo      Osteoporosis Screening  Risks And Benefits DiscussedYes  BMD Currentno  BMD orderedyes  Patient Declinesyes    Colorectal Cancer Screening  Risks and Benefits DiscussedYes  Screening currentno  FOBT Supplies givenNo  FOBT Every YearNo  Colonoscopy Orderedyes  Colonoscopy every 5 yearsno  Colonoscopy every 10 yearsyes  Screening not indicatedNo  Patient declinesNo  Ordered in May. Number provided to her to [facility] with colorectal for this.  Metabolic Screening  GlucoseYes  LipidsYes  CBCYes  TSHYes  UAYes  CMPYes  25OHNo      Immunizations  Risks and benefits discussedYes  Immunizations Up To DateYes  Immunizations Neededno  Immunizations Per OrdersNo  Patient DNoeclines      Preventative Counseling  NutritionYes  Aerobic ExerciseYes  Weight Bearing ExerciseYes  Weight LossYes  Calcium SupplementsYes  Vitamin D SupplementsYes  Reproductive HealthNo  Cardiovascular Risk ReductionYes  Tobacco CessationYes  Alcohol UseYes  Sunscreen UseYes  Self Skin ExaminationYes  Helmet UseYes  Seat Belt UseYes  Fall Risk  ReductionNo  Advanced Directive PlanningNo      Patient Discussion  PatientYes  FamilyNo  CounselingYes  Mirna was seen today for annual exam.    Diagnoses and all orders for this visit:    Healthcare maintenance  -     POC Urinalysis Dipstick, Automated  -     CBC & Differential  -     Comprehensive Metabolic Panel  -     Lipid Panel  -     TSH    Prediabetes  -     POC Glycosylated Hemoglobin (Hb A1C)  -     POC Glucose  On low carb diet and has gotten her A1c down to 5,1

## 2019-07-12 DIAGNOSIS — I10 BENIGN ESSENTIAL HYPERTENSION: ICD-10-CM

## 2019-07-12 RX ORDER — LOSARTAN POTASSIUM AND HYDROCHLOROTHIAZIDE 25; 100 MG/1; MG/1
1 TABLET ORAL DAILY
Qty: 30 TABLET | Refills: 5 | Status: SHIPPED | OUTPATIENT
Start: 2019-07-12 | End: 2020-03-08

## 2019-07-12 RX ORDER — DEXLANSOPRAZOLE 60 MG/1
60 CAPSULE, DELAYED RELEASE ORAL DAILY
Qty: 30 CAPSULE | Refills: 5 | Status: SHIPPED | OUTPATIENT
Start: 2019-07-12 | End: 2019-07-16 | Stop reason: SDUPTHER

## 2019-07-16 RX ORDER — DEXLANSOPRAZOLE 60 MG/1
60 CAPSULE, DELAYED RELEASE ORAL DAILY
Qty: 30 CAPSULE | Refills: 5 | Status: SHIPPED | OUTPATIENT
Start: 2019-07-16 | End: 2019-08-15

## 2019-08-08 ENCOUNTER — HOSPITAL ENCOUNTER (OUTPATIENT)
Dept: MAMMOGRAPHY | Facility: HOSPITAL | Age: 59
Discharge: HOME | End: 2019-08-08
Admitting: INTERNAL MEDICINE

## 2019-08-08 DIAGNOSIS — Z12.31 VISIT FOR SCREENING MAMMOGRAM: ICD-10-CM

## 2019-08-08 PROCEDURE — 77063 BREAST TOMOSYNTHESIS BI: CPT | Performed by: RADIOLOGY

## 2019-08-08 PROCEDURE — 77067 SCR MAMMO BI INCL CAD: CPT

## 2019-08-08 PROCEDURE — 77063 BREAST TOMOSYNTHESIS BI: CPT

## 2019-08-08 PROCEDURE — 77067 SCR MAMMO BI INCL CAD: CPT | Performed by: RADIOLOGY

## 2019-08-26 ENCOUNTER — TELEPHONE (OUTPATIENT)
Dept: INTERNAL MEDICINE | Facility: CLINIC | Age: 59
End: 2019-08-26

## 2019-08-26 ENCOUNTER — OFFICE VISIT (OUTPATIENT)
Dept: INTERNAL MEDICINE | Facility: CLINIC | Age: 59
End: 2019-08-26

## 2019-08-26 VITALS
BODY MASS INDEX: 35.46 KG/M2 | WEIGHT: 209.8 LBS | SYSTOLIC BLOOD PRESSURE: 140 MMHG | OXYGEN SATURATION: 95 % | HEART RATE: 91 BPM | DIASTOLIC BLOOD PRESSURE: 90 MMHG

## 2019-08-26 DIAGNOSIS — I10 BENIGN ESSENTIAL HYPERTENSION: ICD-10-CM

## 2019-08-26 DIAGNOSIS — G89.29 CHRONIC NECK PAIN: Primary | ICD-10-CM

## 2019-08-26 DIAGNOSIS — F41.9 ANXIETY: ICD-10-CM

## 2019-08-26 DIAGNOSIS — M54.2 CHRONIC NECK PAIN: Primary | ICD-10-CM

## 2019-08-26 DIAGNOSIS — R79.89 ABNORMAL TSH: ICD-10-CM

## 2019-08-26 LAB
T4 FREE SERPL-MCNC: 1 NG/DL (ref 0.93–1.7)
TSH SERPL DL<=0.05 MIU/L-ACNC: 1.7 MIU/ML (ref 0.27–4.2)

## 2019-08-26 PROCEDURE — 99213 OFFICE O/P EST LOW 20 MIN: CPT | Performed by: INTERNAL MEDICINE

## 2019-08-26 PROCEDURE — 84443 ASSAY THYROID STIM HORMONE: CPT | Performed by: INTERNAL MEDICINE

## 2019-08-26 PROCEDURE — 84439 ASSAY OF FREE THYROXINE: CPT | Performed by: INTERNAL MEDICINE

## 2019-08-26 RX ORDER — ALPRAZOLAM 0.5 MG/1
0.5 TABLET ORAL AS NEEDED
Qty: 10 TABLET | Refills: 0 | Status: SHIPPED | OUTPATIENT
Start: 2019-08-26 | End: 2020-03-25 | Stop reason: SDUPTHER

## 2019-08-26 NOTE — PROGRESS NOTES
Subjective   Mirna Saavedra is a 59 y.o. female.   Chief Complaint   Patient presents with   • Abnormal Lab     Follow Up       Heartburn   She reports no abdominal pain, no chest pain, no coughing, no nausea, no sore throat or no wheezing. Pertinent negatives include no fatigue.   Hypertension   Pertinent negatives include no chest pain, headaches, neck pain, palpitations or shortness of breath.   Abnormal Lab   Pertinent negatives include no abdominal pain, arthralgias, chest pain, chills, congestion, coughing, diaphoresis, fatigue, fever, headaches, myalgias, nausea, neck pain, numbness, rash, sore throat or vomiting.        . TSh mild elevation. Needs recheck today.  The following portions of the patient's history were reviewed and updated as appropriate: allergies, current medications, past family history, past medical history, past social history, past surgical history and problem list.    Review of Systems   Constitutional: Negative for activity change, appetite change, chills, diaphoresis, fatigue, fever and unexpected weight change.   HENT: Negative for congestion, ear discharge, ear pain, mouth sores, nosebleeds, sinus pressure, sneezing and sore throat.    Eyes: Negative for pain, discharge and itching.   Respiratory: Negative for cough, chest tightness, shortness of breath and wheezing.    Cardiovascular: Negative for chest pain, palpitations and leg swelling.   Gastrointestinal: Negative for abdominal pain, constipation, diarrhea, nausea and vomiting.   Endocrine: Negative for cold intolerance, heat intolerance, polydipsia and polyphagia.   Genitourinary: Negative for dysuria, flank pain, frequency, hematuria and urgency.   Musculoskeletal: Negative for arthralgias, back pain, gait problem, myalgias, neck pain and neck stiffness.   Skin: Negative for color change, pallor and rash.   Neurological: Negative for seizures, speech difficulty, numbness and headaches.   Psychiatric/Behavioral: Negative for  agitation, confusion, decreased concentration and sleep disturbance. The patient is not nervous/anxious.    /90   Pulse 91   Wt 95.2 kg (209 lb 12.8 oz)   SpO2 95%   BMI 35.46 kg/m²       Objective   Physical Exam   Constitutional: She appears well-developed.   HENT:   Head: Normocephalic.   Right Ear: External ear normal.   Left Ear: External ear normal.   Nose: Nose normal.   Mouth/Throat: Oropharynx is clear and moist.   Eyes: Conjunctivae are normal. Pupils are equal, round, and reactive to light.   Neck: No JVD present. No thyromegaly present.   Cardiovascular: Normal rate, regular rhythm and normal heart sounds. Exam reveals no friction rub.   No murmur heard.  Pulmonary/Chest: Effort normal and breath sounds normal. No respiratory distress. She has no wheezes. She has no rales.   Abdominal: Soft. Bowel sounds are normal. She exhibits no distension. There is no tenderness. There is no guarding.   Musculoskeletal: She exhibits no edema or tenderness.   Lymphadenopathy:     She has no cervical adenopathy.   Neurological: She displays normal reflexes. No cranial nerve deficit.   Skin: No rash noted.   Psychiatric: Her behavior is normal.   Nursing note and vitals reviewed.      Assessment/Plan   Mirna was seen today for heartburn and hypertension.    Diagnoses and all orders for this visit:    Benign essential hypertension  Up to today but worried for additional imaging dontrell for her mammogram next week.  Abnormal tsh  Recheck level today    Anxiety  Does not want a daily med. Will give her refill of ten pills for upcoming additional imaging needs for mammogram    The patient has read and signed the [facility] Controlled Substance Contract.  I will continue to see patient for regular follow up appointments.  They are well controlled on their medication.  SRIDHAR is updated every 3 months. The patient is aware of the potential for addiction and dependence.

## 2019-08-26 NOTE — TELEPHONE ENCOUNTER
PONCHO IS CALLING FROM [facility] [place], HE IS NEEDING CLARIFICATION ON PRESCRIPTION SENT IN TODAY. [email or web address] PHONE [phone]

## 2019-09-03 ENCOUNTER — TRANSCRIBE ORDERS (OUTPATIENT)
Dept: INTERNAL MEDICINE | Facility: CLINIC | Age: 59
End: 2019-09-03

## 2019-09-03 ENCOUNTER — HOSPITAL ENCOUNTER (OUTPATIENT)
Dept: MAMMOGRAPHY | Facility: HOSPITAL | Age: 59
Discharge: HOME | End: 2019-09-03
Admitting: RADIOLOGY

## 2019-09-03 ENCOUNTER — TRANSCRIBE ORDERS (OUTPATIENT)
Dept: ADMINISTRATIVE | Facility: HOSPITAL | Age: 59
End: 2019-09-03

## 2019-09-03 DIAGNOSIS — R92.8 ABNORMAL MAMMOGRAM: ICD-10-CM

## 2019-09-03 DIAGNOSIS — Z12.31 VISIT FOR SCREENING MAMMOGRAM: Primary | ICD-10-CM

## 2019-09-03 PROCEDURE — 77065 DX MAMMO INCL CAD UNI: CPT | Performed by: RADIOLOGY

## 2019-09-03 PROCEDURE — 77065 DX MAMMO INCL CAD UNI: CPT

## 2020-01-13 ENCOUNTER — OFFICE VISIT (OUTPATIENT)
Dept: INTERNAL MEDICINE | Facility: CLINIC | Age: 60
End: 2020-01-13

## 2020-01-13 ENCOUNTER — APPOINTMENT (OUTPATIENT)
Dept: LAB | Facility: HOSPITAL | Age: 60
End: 2020-01-13

## 2020-01-13 VITALS
SYSTOLIC BLOOD PRESSURE: 130 MMHG | OXYGEN SATURATION: 98 % | WEIGHT: 211 LBS | HEIGHT: 65 IN | BODY MASS INDEX: 35.16 KG/M2 | HEART RATE: 95 BPM | DIASTOLIC BLOOD PRESSURE: 70 MMHG

## 2020-01-13 DIAGNOSIS — K21.9 GASTROESOPHAGEAL REFLUX DISEASE WITHOUT ESOPHAGITIS: ICD-10-CM

## 2020-01-13 DIAGNOSIS — I10 BENIGN ESSENTIAL HYPERTENSION: Primary | ICD-10-CM

## 2020-01-13 DIAGNOSIS — R73.03 PREDIABETES: ICD-10-CM

## 2020-01-13 DIAGNOSIS — B35.1 ONYCHOMYCOSIS: ICD-10-CM

## 2020-01-13 LAB
ALBUMIN SERPL-MCNC: 4.7 G/DL (ref 3.5–5.2)
ALBUMIN/GLOB SERPL: 1.3 G/DL
ALP SERPL-CCNC: 89 U/L (ref 39–117)
ALT SERPL W P-5'-P-CCNC: 42 U/L (ref 1–33)
ANION GAP SERPL CALCULATED.3IONS-SCNC: 14.1 MMOL/L (ref 5–15)
AST SERPL-CCNC: 57 U/L (ref 1–32)
BILIRUB SERPL-MCNC: 1.1 MG/DL (ref 0.2–1.2)
BUN BLD-MCNC: 10 MG/DL (ref 8–23)
BUN/CREAT SERPL: 13.9 (ref 7–25)
CALCIUM SPEC-SCNC: 10.3 MG/DL (ref 8.6–10.5)
CHLORIDE SERPL-SCNC: 90 MMOL/L (ref 98–107)
CHOLEST SERPL-MCNC: 219 MG/DL (ref 0–200)
CO2 SERPL-SCNC: 25.9 MMOL/L (ref 22–29)
CREAT BLD-MCNC: 0.72 MG/DL (ref 0.57–1)
GFR SERPL CREATININE-BSD FRML MDRD: 82 ML/MIN/1.73
GLOBULIN UR ELPH-MCNC: 3.7 GM/DL
GLUCOSE BLD-MCNC: 136 MG/DL (ref 65–99)
HBA1C MFR BLD: 5.6 % (ref 4.8–5.6)
HDLC SERPL-MCNC: 80 MG/DL (ref 40–60)
LDLC SERPL CALC-MCNC: 120 MG/DL (ref 0–100)
LDLC/HDLC SERPL: 1.5 {RATIO}
POTASSIUM BLD-SCNC: 4.1 MMOL/L (ref 3.5–5.2)
PROT SERPL-MCNC: 8.4 G/DL (ref 6–8.5)
SODIUM BLD-SCNC: 130 MMOL/L (ref 136–145)
TRIGL SERPL-MCNC: 94 MG/DL (ref 0–150)
VLDLC SERPL-MCNC: 18.8 MG/DL (ref 5–40)

## 2020-01-13 PROCEDURE — 80061 LIPID PANEL: CPT | Performed by: INTERNAL MEDICINE

## 2020-01-13 PROCEDURE — 99213 OFFICE O/P EST LOW 20 MIN: CPT | Performed by: INTERNAL MEDICINE

## 2020-01-13 PROCEDURE — 83036 HEMOGLOBIN GLYCOSYLATED A1C: CPT | Performed by: INTERNAL MEDICINE

## 2020-01-13 PROCEDURE — 80053 COMPREHEN METABOLIC PANEL: CPT | Performed by: INTERNAL MEDICINE

## 2020-01-13 RX ORDER — DEXLANSOPRAZOLE 60 MG/1
CAPSULE, DELAYED RELEASE ORAL
COMMUNITY
Start: 2019-11-24 | End: 2020-03-25 | Stop reason: SDUPTHER

## 2020-01-13 NOTE — PROGRESS NOTES
Subjective   Mirna Saavedra is a 59 y.o. female.   Chief Complaint   Patient presents with   • Hypertension       Heartburn   She reports no abdominal pain, no chest pain, no coughing, no nausea, no sore throat or no wheezing. Pertinent negatives include no fatigue.   Hypertension   Pertinent negatives include no chest pain, headaches, neck pain, palpitations or shortness of breath.      Here for f/u on htn, prediabtes, erythrocytosis, and elevated liver enzymes. Heartburn is table with PPI.  Sugars have been controlled with diet.  Yellow toenails, 1st two toes of left foot. Antifungal did not help.   The following portions of the patient's history were reviewed and updated as appropriate: allergies, current medications, past family history, past medical history, past social history, past surgical history and problem list.    Review of Systems   Constitutional: Negative for activity change, appetite change, chills, diaphoresis, fatigue, fever and unexpected weight change.   HENT: Negative for congestion, ear discharge, ear pain, mouth sores, nosebleeds, sinus pressure, sneezing and sore throat.    Eyes: Negative for pain, discharge and itching.   Respiratory: Negative for cough, chest tightness, shortness of breath and wheezing.    Cardiovascular: Negative for chest pain, palpitations and leg swelling.   Gastrointestinal: Negative for abdominal pain, constipation, diarrhea, nausea and vomiting.   Endocrine: Negative for cold intolerance, heat intolerance, polydipsia and polyphagia.   Genitourinary: Negative for dysuria, flank pain, frequency, hematuria and urgency.   Musculoskeletal: Negative for arthralgias, back pain, gait problem, myalgias, neck pain and neck stiffness.   Skin: Negative for color change, pallor and rash.   Neurological: Negative for seizures, speech difficulty, numbness and headaches.   Psychiatric/Behavioral: Negative for agitation, confusion, decreased concentration and sleep disturbance.  "The patient is not nervous/anxious.    /70   Pulse 95   Ht 163.8 cm (64.5\")   Wt 95.7 kg (211 lb)   SpO2 98%   BMI 35.66 kg/m²       Objective   Physical Exam   Constitutional: She appears well-developed.   HENT:   Head: Normocephalic.   Right Ear: External ear normal.   Left Ear: External ear normal.   Nose: Nose normal.   Mouth/Throat: Oropharynx is clear and moist.   Eyes: Pupils are equal, round, and reactive to light. Conjunctivae are normal.   Neck: No JVD present. No thyromegaly present.   Cardiovascular: Normal rate, regular rhythm and normal heart sounds. Exam reveals no friction rub.   No murmur heard.  Pulmonary/Chest: Effort normal and breath sounds normal. No respiratory distress. She has no wheezes. She has no rales.   Abdominal: Soft. Bowel sounds are normal. She exhibits no distension. There is no tenderness. There is no guarding.   Musculoskeletal: She exhibits no edema or tenderness.        Lymphadenopathy:     She has no cervical adenopathy.   Neurological: She displays normal reflexes. No cranial nerve deficit.   Skin: No rash noted.   Psychiatric: Her behavior is normal.   Nursing note and vitals reviewed.      Assessment/Plan   Mirna was seen today for heartburn and hypertension.    Diagnoses and all orders for this visit:    Benign essential hypertension  add bystolic 5 mg po qd  Prediabetes  A1c is 5.3      GERD  Stable                  "

## 2020-01-27 ENCOUNTER — TELEPHONE (OUTPATIENT)
Dept: INTERNAL MEDICINE | Facility: CLINIC | Age: 60
End: 2020-01-27

## 2020-01-27 NOTE — TELEPHONE ENCOUNTER
PT called with an update, states there was a bit of a misunderstanding this morning. States PCP's office called to confirm PT's appointment/request records from [facility] and were told that PT did not have an appointment.     PT states  at the foot specialist's office had her name wrong. PT was seen today by Dr. David Reyes and would like PCP to request records from that visit.     PT also states she'll call soon to schedule an appointment based off of her recent labwork as instructed.    No action needed.

## 2020-03-04 ENCOUNTER — HOSPITAL ENCOUNTER (OUTPATIENT)
Dept: MAMMOGRAPHY | Facility: HOSPITAL | Age: 60
Discharge: HOME | End: 2020-03-04
Admitting: INTERNAL MEDICINE

## 2020-03-04 DIAGNOSIS — R92.8 ABNORMAL MAMMOGRAM: ICD-10-CM

## 2020-03-04 PROCEDURE — 77065 DX MAMMO INCL CAD UNI: CPT

## 2020-03-04 PROCEDURE — 77065 DX MAMMO INCL CAD UNI: CPT | Performed by: RADIOLOGY

## 2020-03-05 ENCOUNTER — TRANSCRIBE ORDERS (OUTPATIENT)
Dept: ADMINISTRATIVE | Facility: HOSPITAL | Age: 60
End: 2020-03-05

## 2020-03-05 DIAGNOSIS — R92.8 ABNORMAL MAMMOGRAM: Primary | ICD-10-CM

## 2020-03-08 DIAGNOSIS — I10 BENIGN ESSENTIAL HYPERTENSION: ICD-10-CM

## 2020-03-08 RX ORDER — LOSARTAN POTASSIUM AND HYDROCHLOROTHIAZIDE 25; 100 MG/1; MG/1
TABLET ORAL
Qty: 30 TABLET | Refills: 4 | Status: SHIPPED | OUTPATIENT
Start: 2020-03-08 | End: 2020-08-06 | Stop reason: SDUPTHER

## 2020-03-25 DIAGNOSIS — F41.9 ANXIETY: ICD-10-CM

## 2020-03-25 NOTE — TELEPHONE ENCOUNTER
PT NEEDS AN REFILL ON DEXILANT 60 MG capsule, AND ALPRAZolam (XANAX) 0.5 MG tablet. PT WOULD LIKE TO SEE IF SHE CAN GET ANOTHER WRITTEN PRESCRIPTION FOR THE XANAX.    SENT TO [facility] IN [facility][1].     PLEASE ADVISE -[phone].

## 2020-03-26 DIAGNOSIS — F41.9 ANXIETY: ICD-10-CM

## 2020-03-26 RX ORDER — ALPRAZOLAM 0.5 MG/1
0.5 TABLET ORAL AS NEEDED
Qty: 30 TABLET | Refills: 0 | Status: SHIPPED | OUTPATIENT
Start: 2020-03-26 | End: 2022-01-24 | Stop reason: SDUPTHER

## 2020-03-26 RX ORDER — DEXLANSOPRAZOLE 60 MG/1
60 CAPSULE, DELAYED RELEASE ORAL DAILY
Qty: 90 CAPSULE | Refills: 0 | Status: SHIPPED | OUTPATIENT
Start: 2020-03-26 | End: 2020-07-21

## 2020-03-26 RX ORDER — ALPRAZOLAM 0.5 MG/1
0.5 TABLET ORAL AS NEEDED
Qty: 30 TABLET | Refills: 0 | Status: SHIPPED | OUTPATIENT
Start: 2020-03-26 | End: 2020-03-26 | Stop reason: SDUPTHER

## 2020-04-08 ENCOUNTER — TELEMEDICINE (OUTPATIENT)
Dept: SLEEP MEDICINE | Facility: HOSPITAL | Age: 60
End: 2020-04-08

## 2020-04-08 DIAGNOSIS — G47.33 OBSTRUCTIVE SLEEP APNEA, ADULT: Primary | ICD-10-CM

## 2020-04-08 DIAGNOSIS — E66.3 OVERWEIGHT: ICD-10-CM

## 2020-04-08 PROCEDURE — 99213 OFFICE O/P EST LOW 20 MIN: CPT | Performed by: INTERNAL MEDICINE

## 2020-04-08 NOTE — PROGRESS NOTES
Subjective   Mirna Saavedra is a 59 y.o. female is here today for follow-up.  This is a video visit.  The patient understands limitations of the format.  She is followed here with obstructive sleep apnea.  Her primary physician is Dr. Acosta.    History of Present Illness  Patient was last seen in this clinic February 18, 2019.  She has moderate obstructive sleep apnea with an AHI of 26.5 on her previous study.  She has been on CPAP since then.  She says she is doing well with the machine.  She denies any real problems.  She says she is sleeping better with the mask.  She occasionally has mask leak when she is tries to sleep on her side.  She denies awakening with headaches dry mouth or dry nose.  She denies any skin irritation.  She says she generally is rested in the morning and denies morning headaches.  She denies any real changes in her health.  She has been told she might be prediabetic.  She has history of hypertension.  Past Medical History:   Diagnosis Date   • Elevated ferritin    • Elevated liver enzymes    • Fatigue    • Hair loss    • Hypertension    • Mitral valve prolapse    • Ovarian cyst    • Peptic ulceration        Past Surgical History:   Procedure Laterality Date   • BREAST BIOPSY Right 2010   • COLONOSCOPY  2008   • EXPLORATORY LAPAROTOMY  1993   • HAND SURGERY Left 1987    torn ligament repair   • HYSTERECTOMY  2006   • HYSTEROSCOPY     • HYSTEROSCOPY  1993   • SKIN BIOPSY      multiple           Current Outpatient Medications:   •  ALPRAZolam (XANAX) 0.5 MG tablet, Take 1 tablet by mouth As Needed for Anxiety., Disp: 30 tablet, Rfl: 0  •  DEXILANT 60 MG capsule, Take 1 capsule by mouth Daily., Disp: 90 capsule, Rfl: 0  •  losartan-hydrochlorothiazide (HYZAAR) 100-25 MG per tablet, TAKE ONE TABLET BY MOUTH DAILY, Disp: 30 tablet, Rfl: 4  •  metoprolol tartrate (LOPRESSOR) 50 MG tablet, Take 1 tablet by mouth Daily., Disp: 30 tablet, Rfl: 2    Allergies   Allergen Reactions   • Contrast  Aden Elaine       The following portions of the patient's history were reviewed and updated as appropriate: allergies, current medications and problem list.    Review of Systems   Constitutional: Negative.    Eyes: Positive for visual disturbance.   Respiratory: Negative.    Cardiovascular: Negative.    Gastrointestinal: Negative.    Endocrine: Positive for polydipsia.   Genitourinary: Negative.    Musculoskeletal: Negative.    Skin: Negative.    Allergic/Immunologic: Positive for environmental allergies.   Neurological: Positive for headaches.   Hematological: Negative.    Psychiatric/Behavioral: The patient is nervous/anxious.    Wickliffe score is 1/24  Objective     There were no vitals taken for this visit.    Physical Exam  Patient appears awake and alert.  She does not appear to be any respiratory distress.    Download from machine for the past 92 days shows she is used at 75% of the time.  She is 9 hours 3 minutes/day.  Her 90th percentile pressure is 11.5.  Her AHI is normal at 0.8.    Assessment/Plan   Diagnoses and all orders for this visit:    Obstructive sleep apnea, adult  -     Generic Auto PAP Order    Overweight    Patient is a history of moderate obstructive sleep apnea.  She has excellent control her respiratory events on her current pressure settings and CPAP.  We will renew her supplies and continue on these pressures.  We have discussed other potential therapies including weight control, oral appliance, and surgery.  She wishes to continue with her CPAP.  She is encouraged to lose weight.  She is encouraged avoid alcohol and sedatives close to bedtime.  She is encouraged to practice lateral position sleep.  We will plan to see her back in 1 year.  She is to contact us earlier if symptoms worsen.             Kristian Shah MD [facility]  Sleep Medicine  Pulmonary and Critical Care Medicine      04/08/20  1:37 PM

## 2020-07-21 RX ORDER — DEXLANSOPRAZOLE 60 MG/1
CAPSULE, DELAYED RELEASE ORAL
Qty: 30 CAPSULE | Refills: 0 | Status: SHIPPED | OUTPATIENT
Start: 2020-07-21 | End: 2020-08-20

## 2020-08-05 DIAGNOSIS — I10 BENIGN ESSENTIAL HYPERTENSION: ICD-10-CM

## 2020-08-05 RX ORDER — LOSARTAN POTASSIUM AND HYDROCHLOROTHIAZIDE 25; 100 MG/1; MG/1
TABLET ORAL
Qty: 30 TABLET | Refills: 3 | OUTPATIENT
Start: 2020-08-05

## 2020-08-06 ENCOUNTER — OFFICE VISIT (OUTPATIENT)
Dept: INTERNAL MEDICINE | Facility: CLINIC | Age: 60
End: 2020-08-06

## 2020-08-06 VITALS — SYSTOLIC BLOOD PRESSURE: 125 MMHG | DIASTOLIC BLOOD PRESSURE: 71 MMHG

## 2020-08-06 DIAGNOSIS — R74.8 ELEVATED LIVER ENZYMES: Primary | ICD-10-CM

## 2020-08-06 DIAGNOSIS — I10 BENIGN ESSENTIAL HYPERTENSION: ICD-10-CM

## 2020-08-06 PROCEDURE — 99442 PR PHYS/QHP TELEPHONE EVALUATION 11-20 MIN: CPT | Performed by: INTERNAL MEDICINE

## 2020-08-06 RX ORDER — LOSARTAN POTASSIUM AND HYDROCHLOROTHIAZIDE 25; 100 MG/1; MG/1
1 TABLET ORAL DAILY
Qty: 30 TABLET | Refills: 2 | Status: SHIPPED | OUTPATIENT
Start: 2020-08-06 | End: 2020-11-04

## 2020-08-06 NOTE — PROGRESS NOTES
Subjective   Mirna Saavedra is a 60 y.o. female.   Chief Complaint   Patient presents with   • Hypertension       Hypertension   Pertinent negatives include no chest pain, headaches, neck pain, palpitations or shortness of breath.   Heartburn   She reports no abdominal pain, no chest pain, no coughing, no nausea, no sore throat or no wheezing. Pertinent negatives include no fatigue.      Here for f/u on htn, prediabtes, gerd, and elevated liver enzymes. Heartburn is table with PPI.  Sugars have been controlled with diet.  Sodium low and did not come back for recheck as requested.   The following portions of the patient's history were reviewed and updated as appropriate: allergies, current medications, past family history, past medical history, past social history, past surgical history and problem list.    Review of Systems   Constitutional: Negative for activity change, appetite change, chills, diaphoresis, fatigue, fever and unexpected weight change.   HENT: Negative for congestion, ear discharge, ear pain, mouth sores, nosebleeds, sinus pressure, sneezing and sore throat.    Eyes: Negative for pain, discharge and itching.   Respiratory: Negative for cough, chest tightness, shortness of breath and wheezing.    Cardiovascular: Negative for chest pain, palpitations and leg swelling.   Gastrointestinal: Negative for abdominal pain, constipation, diarrhea, nausea and vomiting.   Endocrine: Negative for cold intolerance, heat intolerance, polydipsia and polyphagia.   Genitourinary: Negative for dysuria, flank pain, frequency, hematuria and urgency.   Musculoskeletal: Negative for arthralgias, back pain, gait problem, myalgias, neck pain and neck stiffness.   Skin: Negative for color change, pallor and rash.   Neurological: Negative for seizures, speech difficulty, numbness and headaches.   Psychiatric/Behavioral: Negative for agitation, confusion, decreased concentration and sleep disturbance. The patient is not  nervous/anxious.    /71       Objective   Physical Exam   Constitutional: She is oriented to person, place, and time.   Pulmonary/Chest: No respiratory distress.   Neurological: She is oriented to person, place, and time.   Psychiatric: She has a normal mood and affect. Her behavior is normal.       Assessment/Plan   Mirna was seen today for heartburn and hypertension.    Diagnoses and all orders for this visit:    Benign essential hypertension  add bystolic 5 mg po qd  Prediabetes  A1c is 5.3      GERD  Stable  On dexilant  Elevated lfts and did not come back to repeat labs. Also did not come ack to repeat sodium which was low. Explained the lower the sodium the higher the risk of seizures so she needs to come in for blood work.       This visit has been rescheduled as a phone visit to comply with patient safety concerns in accordance with CDC recommendations. Total time of discussion was 15 minutes.

## 2020-08-20 RX ORDER — DEXLANSOPRAZOLE 60 MG/1
CAPSULE, DELAYED RELEASE ORAL
Qty: 90 CAPSULE | Refills: 1 | Status: SHIPPED | OUTPATIENT
Start: 2020-08-20 | End: 2021-02-24

## 2020-09-04 ENCOUNTER — APPOINTMENT (OUTPATIENT)
Dept: MAMMOGRAPHY | Facility: HOSPITAL | Age: 60
End: 2020-09-04

## 2020-09-30 ENCOUNTER — TELEMEDICINE (OUTPATIENT)
Dept: INTERNAL MEDICINE | Facility: CLINIC | Age: 60
End: 2020-09-30

## 2020-09-30 DIAGNOSIS — I10 ESSENTIAL HYPERTENSION: ICD-10-CM

## 2020-09-30 DIAGNOSIS — M79.89 SWELLING OF RIGHT THUMB: Primary | ICD-10-CM

## 2020-09-30 PROCEDURE — 99213 OFFICE O/P EST LOW 20 MIN: CPT | Performed by: INTERNAL MEDICINE

## 2020-09-30 NOTE — PROGRESS NOTES
Subjective   Mirna Saavedra is a 60 y.o. female.   Chief Complaint   Patient presents with   • thumb swelling       History of Present Illness   2 week swelling of side of right thumb. Mild pain when touches it. No fever or chills. Not red.   The following portions of the patient's history were reviewed and updated as appropriate: allergies, current medications, past family history, past medical history, past social history, past surgical history and problem list.    Review of Systems   Constitutional: Negative for activity change, appetite change, chills, diaphoresis, fatigue, fever and unexpected weight change.   HENT: Negative for congestion, ear discharge, ear pain, mouth sores, nosebleeds, sinus pressure, sneezing and sore throat.    Eyes: Negative for pain, discharge and itching.   Respiratory: Negative for cough, chest tightness, shortness of breath and wheezing.    Cardiovascular: Negative for chest pain, palpitations and leg swelling.   Gastrointestinal: Negative for abdominal pain, constipation, diarrhea, nausea and vomiting.   Endocrine: Negative for cold intolerance, heat intolerance, polydipsia and polyphagia.   Genitourinary: Negative for dysuria, flank pain, frequency, hematuria and urgency.   Musculoskeletal: Negative for arthralgias, back pain, gait problem, myalgias, neck pain and neck stiffness.        Thumb swelling   Skin: Negative for color change, pallor and rash.   Neurological: Negative for seizures, speech difficulty, numbness and headaches.   Psychiatric/Behavioral: Negative for agitation, confusion, decreased concentration and sleep disturbance. The patient is not nervous/anxious.        Objective   Physical Exam  Constitutional:       Appearance: Normal appearance.   HENT:      Head: Normocephalic and atraumatic.   Eyes:      General: No scleral icterus.        Right eye: No discharge.         Left eye: No discharge.   Pulmonary:      Effort: No respiratory distress.    Musculoskeletal:         General: Tenderness (right thenar eminence ) present.   Skin:         Neurological:      General: No focal deficit present.      Mental Status: She is alert and oriented to person, place, and time.   Psychiatric:         Mood and Affect: Mood normal.         Behavior: Behavior normal.         Assessment/Plan   Diagnoses and all orders for this visit:    Swelling of right thumb  -     D-dimer, Quantitative; Future  (swelling of thenar eminence right)  Naproxen 500 mg po bid x 10 days  Essential hypertension  -     Comprehensive Metabolic Panel; Future  -     Lipid Panel; Future  -     CBC & Differential; Future        This patient has consented to a telehealth visit via video. The visit was scheduled to comply with patient safety concerns in accordance with CDC recommendations.  I spent -- minutes in total including but not limited to the 15 minutes spent in direct conversation with this patient.

## 2020-10-15 ENCOUNTER — TELEMEDICINE (OUTPATIENT)
Dept: INTERNAL MEDICINE | Facility: CLINIC | Age: 60
End: 2020-10-15

## 2020-10-15 DIAGNOSIS — M79.89 SWELLING OF RIGHT THUMB: Primary | ICD-10-CM

## 2020-10-15 PROCEDURE — 99213 OFFICE O/P EST LOW 20 MIN: CPT | Performed by: INTERNAL MEDICINE

## 2020-10-15 NOTE — PROGRESS NOTES
Subjective   Mirna Saavedra is a 60 y.o. female.   Chief Complaint   Patient presents with   • Follow-up     thumb swelling.  Patient states improvement       History of Present Illness   F/u on right thumb swelling.  She took Naproxen for few days but stopped after it bothered her stomach. She figured out the pain was from texting on her phone. The pain and swelling have resolved after she stopped doing this.   The following portions of the patient's history were reviewed and updated as appropriate: allergies, current medications, past family history, past medical history, past social history, past surgical history and problem list.    Review of Systems   Constitutional: Negative for activity change, appetite change, chills, diaphoresis, fatigue, fever and unexpected weight change.   HENT: Negative for congestion, ear discharge, ear pain, mouth sores, nosebleeds, sinus pressure, sneezing and sore throat.    Eyes: Negative for pain, discharge and itching.   Respiratory: Negative for cough, chest tightness, shortness of breath and wheezing.    Cardiovascular: Negative for chest pain, palpitations and leg swelling.   Gastrointestinal: Negative for abdominal pain, constipation, diarrhea, nausea and vomiting.   Endocrine: Negative for cold intolerance, heat intolerance, polydipsia and polyphagia.   Genitourinary: Negative for dysuria, flank pain, frequency, hematuria and urgency.   Musculoskeletal: Negative for arthralgias, back pain, gait problem, myalgias, neck pain and neck stiffness.   Skin: Negative for color change, pallor and rash.   Neurological: Negative for seizures, speech difficulty, numbness and headaches.   Psychiatric/Behavioral: Negative for agitation, confusion, decreased concentration and sleep disturbance. The patient is not nervous/anxious.        Objective   Physical Exam  Constitutional:       Appearance: Normal appearance.   HENT:      Head: Normocephalic and atraumatic.      Nose: No  congestion.   Eyes:      General:         Right eye: No discharge.         Left eye: No discharge.   Pulmonary:      Effort: No respiratory distress.   Neurological:      General: No focal deficit present.      Mental Status: She is alert and oriented to person, place, and time.   Psychiatric:         Mood and Affect: Mood normal.         Behavior: Behavior normal.         Assessment/Plan   Diagnoses and all orders for this visit:    1. Swelling of right thumb (Primary)  Better with few days of Nsaids and no longer texting      This patient has consented to a telehealth visit via video. Converted to audio only secondary to technology difficulty. The visit was scheduled to comply with patient safety concerns in accordance with CDC recommendations.  I spent 15 minutes in total including but not limited to the 15 minutes spent in direct conversation with this patient.

## 2020-11-04 DIAGNOSIS — I10 BENIGN ESSENTIAL HYPERTENSION: ICD-10-CM

## 2020-11-04 RX ORDER — LOSARTAN POTASSIUM AND HYDROCHLOROTHIAZIDE 25; 100 MG/1; MG/1
TABLET ORAL
Qty: 30 TABLET | Refills: 1 | Status: SHIPPED | OUTPATIENT
Start: 2020-11-04 | End: 2021-01-09

## 2020-11-24 ENCOUNTER — PRIOR AUTHORIZATION (OUTPATIENT)
Dept: INTERNAL MEDICINE | Facility: CLINIC | Age: 60
End: 2020-11-24

## 2020-12-14 ENCOUNTER — TELEMEDICINE (OUTPATIENT)
Dept: INTERNAL MEDICINE | Facility: CLINIC | Age: 60
End: 2020-12-14

## 2020-12-14 DIAGNOSIS — I10 BENIGN ESSENTIAL HYPERTENSION: Primary | ICD-10-CM

## 2020-12-14 DIAGNOSIS — K21.00 GASTROESOPHAGEAL REFLUX DISEASE WITH ESOPHAGITIS WITHOUT HEMORRHAGE: ICD-10-CM

## 2020-12-14 DIAGNOSIS — R73.03 PREDIABETES: ICD-10-CM

## 2020-12-14 PROCEDURE — 99213 OFFICE O/P EST LOW 20 MIN: CPT | Performed by: INTERNAL MEDICINE

## 2020-12-14 NOTE — PROGRESS NOTES
Subjective   Mirna Saavedra is a 60 y.o. female.   Chief Complaint   Patient presents with   • Hypertension   • Heartburn       Hypertension  Pertinent negatives include no chest pain, headaches, neck pain, palpitations or shortness of breath.   Heartburn  She reports no abdominal pain, no chest pain, no coughing, no nausea, no sore throat or no wheezing. Pertinent negatives include no fatigue.      Here for f/u on htn, prediabtes, gerd, and elevated liver enzymes. Heartburn is table with PPI.  Sugars have been controlled with diet.    The following portions of the patient's history were reviewed and updated as appropriate: allergies, current medications, past family history, past medical history, past social history, past surgical history and problem list.    Review of Systems   Constitutional: Negative for activity change, appetite change, chills, diaphoresis, fatigue, fever and unexpected weight change.   HENT: Negative for congestion, ear discharge, ear pain, mouth sores, nosebleeds, sinus pressure, sneezing and sore throat.    Eyes: Negative for pain, discharge and itching.   Respiratory: Negative for cough, chest tightness, shortness of breath and wheezing.    Cardiovascular: Negative for chest pain, palpitations and leg swelling.   Gastrointestinal: Negative for abdominal pain, constipation, diarrhea, nausea and vomiting.   Endocrine: Negative for cold intolerance, heat intolerance, polydipsia and polyphagia.   Genitourinary: Negative for dysuria, flank pain, frequency, hematuria and urgency.   Musculoskeletal: Negative for arthralgias, back pain, gait problem, myalgias, neck pain and neck stiffness.   Skin: Negative for color change, pallor and rash.   Neurological: Negative for seizures, speech difficulty, numbness and headaches.   Psychiatric/Behavioral: Negative for agitation, confusion, decreased concentration and sleep disturbance. The patient is not nervous/anxious.    There were no vitals taken  for this visit.      Objective   Physical Exam   Constitutional: She is oriented to person, place, and time.   Pulmonary/Chest: No respiratory distress.   Neurological: She is oriented to person, place, and time.   Psychiatric: Her behavior is normal.       Assessment/Plan   Mirna was seen today for heartburn and hypertension.    Diagnoses and all orders for this visit:    Benign essential hypertension  add bystolic 5 mg po qd  Prediabetes  A1c needed at next visit. Doing low carb diet.       GERD  Stable  On dexilant        This visit has been rescheduled as a phone visit to comply with patient safety concerns in accordance with CDC recommendations. Total time of discussion was 15 minutes.

## 2021-01-09 DIAGNOSIS — I10 BENIGN ESSENTIAL HYPERTENSION: ICD-10-CM

## 2021-01-09 RX ORDER — LOSARTAN POTASSIUM AND HYDROCHLOROTHIAZIDE 25; 100 MG/1; MG/1
TABLET ORAL
Qty: 30 TABLET | Refills: 0 | Status: SHIPPED | OUTPATIENT
Start: 2021-01-09 | End: 2021-02-11

## 2021-01-09 NOTE — TELEPHONE ENCOUNTER
Last Office Visit: 12/14/20  Next Office Visit: 4/14/21    Labs completed in past 6 months? no  Labs completed in past year? no    Last Refill Date: 11/4/20  Quantity: 30  Refills: 1    Pharmacy: on file

## 2021-02-11 DIAGNOSIS — I10 BENIGN ESSENTIAL HYPERTENSION: ICD-10-CM

## 2021-02-11 RX ORDER — LOSARTAN POTASSIUM AND HYDROCHLOROTHIAZIDE 25; 100 MG/1; MG/1
TABLET ORAL
Qty: 30 TABLET | Refills: 0 | Status: SHIPPED | OUTPATIENT
Start: 2021-02-11 | End: 2021-03-11

## 2021-02-11 NOTE — TELEPHONE ENCOUNTER
Last Office Visit: 12/14/20  Next Office Visit: 4/14/21    Labs completed in past 6 months? no  Labs completed in past year? no    Last Refill Date: 1/9/21  Quantity: 30   Refills:    Pharmacy:

## 2021-02-24 RX ORDER — DEXLANSOPRAZOLE 60 MG/1
CAPSULE, DELAYED RELEASE ORAL
Qty: 90 CAPSULE | Refills: 0 | Status: SHIPPED | OUTPATIENT
Start: 2021-02-24 | End: 2021-05-24

## 2021-02-24 NOTE — TELEPHONE ENCOUNTER
Last Office Visit:  12/14/20  Next Office Visit: 04/14/21    Labs completed in past 6 months? no  Labs completed in past year? YES    Last Refill Date: 08/20/20  Quantity:90  Refills:1    Pharmacy:

## 2021-03-11 DIAGNOSIS — I10 BENIGN ESSENTIAL HYPERTENSION: ICD-10-CM

## 2021-03-11 RX ORDER — LOSARTAN POTASSIUM AND HYDROCHLOROTHIAZIDE 25; 100 MG/1; MG/1
TABLET ORAL
Qty: 90 TABLET | Refills: 0 | Status: SHIPPED | OUTPATIENT
Start: 2021-03-11 | End: 2021-06-11

## 2021-03-11 NOTE — TELEPHONE ENCOUNTER
Last Office Visit:  12/14/20  Next Office Visit: 04/14/21    Labs completed in past 6 months? no  Labs completed in past year? yes    Last Refill Date: 02/11/21  Quantity:30  Refills:0    Pharmacy:

## 2021-04-08 ENCOUNTER — TELEMEDICINE (OUTPATIENT)
Dept: SLEEP MEDICINE | Facility: HOSPITAL | Age: 61
End: 2021-04-08

## 2021-04-08 VITALS — HEIGHT: 64 IN | BODY MASS INDEX: 36.22 KG/M2

## 2021-04-08 DIAGNOSIS — G47.33 OBSTRUCTIVE SLEEP APNEA, ADULT: Primary | ICD-10-CM

## 2021-04-08 PROCEDURE — 99212 OFFICE O/P EST SF 10 MIN: CPT | Performed by: NURSE PRACTITIONER

## 2021-04-08 NOTE — PROGRESS NOTES
Chief Complaint:   Chief Complaint   Patient presents with   • Follow-up       HPI:    Mirna Saavedra is a 60 y.o. female here for follow-up of sleep apnea.  Patient was last seen 4/8/2020.  Patient states she continues to do well with CPAP therapy.  Patient is sleeping 7 to 8 hours nightly and does feel refreshed upon awakening.  Patient will go to sleep at varied times due to what ever is on her mind.  She will usually get up x1 in the night to use the restroom and does not have difficulty going back to sleep.  Patient has an [place] score of 4/24.  Patient has no concerns or complaints regarding CPAP and wishes to continue.        Current medications are:   Current Outpatient Medications:   •  ALPRAZolam (XANAX) 0.5 MG tablet, Take 1 tablet by mouth As Needed for Anxiety., Disp: 30 tablet, Rfl: 0  •  Dexilant 60 MG capsule, TAKE ONE CAPSULE BY MOUTH DAILY, Disp: 90 capsule, Rfl: 0  •  losartan-hydrochlorothiazide (HYZAAR) 100-25 MG per tablet, TAKE ONE TABLET BY MOUTH DAILY, Disp: 90 tablet, Rfl: 0.      The patient's relevant past medical, surgical, family and social history were reviewed and updated in Epic as appropriate.       Review of Systems   Eyes: Positive for visual disturbance.   Respiratory: Positive for apnea.    Endocrine: Positive for polydipsia.   Allergic/Immunologic: Positive for environmental allergies.   Neurological: Positive for headaches.   Psychiatric/Behavioral: Positive for sleep disturbance. The patient is nervous/anxious.    All other systems reviewed and are negative.        Objective:    Physical Exam  Constitutional:       Appearance: Normal appearance. She is obese.   HENT:      Head: Normocephalic and atraumatic.      Mouth/Throat:      Comments: Class 2 airway  Pulmonary:      Effort: Pulmonary effort is normal. No respiratory distress.   Skin:     General: Skin is dry.      Coloration: Skin is not pale.   Neurological:      Mental Status: She is alert and oriented to  person, place, and time.   Psychiatric:         Mood and Affect: Mood normal.         Behavior: Behavior normal.         Thought Content: Thought content normal.         Judgment: Judgment normal.     89/90 days of use  Greater than 4-hour use 11.5  AHI of 0.2  Settings 10-18      ASSESSMENT/PLAN    Diagnoses and all orders for this visit:    1. Obstructive sleep apnea, adult (Primary)  -     CPAP Therapy            1. Counseled patient regarding multimodal approach with healthy nutrition, healthy sleep, regular physical activity, social activities, counseling, and medications. Encouraged to practice lateral sleep position. Avoid alcohol and sedatives close to bedtime.  2.   Refill supplies x1 year.  Return to clinic 1 year or sooner symptoms warrant.  Patient gave verbal consent for video visit.  I have reviewed the results of my evaluation and impression and discussed my recommendations in detail with the patient.      Signed by  GREG Tuttle    April 8, 2021      CC: Chiquita Acosta DO          No ref. provider found

## 2021-04-14 ENCOUNTER — TELEMEDICINE (OUTPATIENT)
Dept: INTERNAL MEDICINE | Facility: CLINIC | Age: 61
End: 2021-04-14

## 2021-04-14 DIAGNOSIS — R73.03 PREDIABETES: ICD-10-CM

## 2021-04-14 DIAGNOSIS — K21.00 GASTROESOPHAGEAL REFLUX DISEASE WITH ESOPHAGITIS WITHOUT HEMORRHAGE: ICD-10-CM

## 2021-04-14 DIAGNOSIS — I10 BENIGN ESSENTIAL HYPERTENSION: Primary | ICD-10-CM

## 2021-04-14 PROCEDURE — 99214 OFFICE O/P EST MOD 30 MIN: CPT | Performed by: INTERNAL MEDICINE

## 2021-04-14 NOTE — PROGRESS NOTES
Subjective   Mirna Saavedra is a 60 y.o. female.   Chief Complaint   Patient presents with   • Hypertension   • Heartburn   • Prediabetes       History of Present Illness   F/u on htn, gerd, and prediabetes. Staying at home because of Covid. Does sit out on her patio when warm.  No CP or SOA. No HA.  BP controlled with losartan hctz. Gerd controlled with dexilant. Prediabetes controlled with dit.   The following portions of the patient's history were reviewed and updated as appropriate: allergies, current medications, past family history, past medical history, past social history, past surgical history and problem list.    Review of Systems   Constitutional: Negative for activity change, appetite change, chills, diaphoresis, fatigue, fever and unexpected weight change.   HENT: Negative for congestion, ear discharge, ear pain, mouth sores, nosebleeds, sinus pressure, sneezing and sore throat.    Eyes: Negative for pain, discharge and itching.   Respiratory: Negative for cough, chest tightness, shortness of breath and wheezing.    Cardiovascular: Negative for chest pain, palpitations and leg swelling.   Gastrointestinal: Negative for abdominal pain, constipation, diarrhea, nausea and vomiting.   Endocrine: Negative for cold intolerance, heat intolerance, polydipsia and polyphagia.   Genitourinary: Negative for dysuria, flank pain, frequency, hematuria and urgency.   Musculoskeletal: Negative for arthralgias, back pain, gait problem, myalgias, neck pain and neck stiffness.   Skin: Negative for color change, pallor and rash.   Neurological: Negative for seizures, speech difficulty, numbness and headaches.   Psychiatric/Behavioral: Negative for agitation, confusion, decreased concentration and sleep disturbance. The patient is not nervous/anxious.        Objective   Physical Exam  Vitals and nursing note reviewed.   Constitutional:       Appearance: Normal appearance. She is well-developed.   HENT:      Head:  Normocephalic.      Right Ear: External ear normal.      Left Ear: External ear normal.      Nose: Nose normal.   Eyes:      General: No scleral icterus.  Neck:      Thyroid: No thyromegaly.      Vascular: No JVD.   Skin:     Findings: No rash.   Neurological:      General: No focal deficit present.      Mental Status: She is alert and oriented to person, place, and time.   Psychiatric:         Mood and Affect: Mood normal.         Behavior: Behavior normal.         Assessment/Plan   Diagnoses and all orders for this visit:    1. Benign essential hypertension (Primary)  Controlled with losartan hctz 100/25 mg po qd  2. Prediabetes  Controlled with low carb diet  3. Gastroesophageal reflux disease with esophagitis without hemorrhage  Controlled with dexilant 60 mg po qd      This patient has consented to a telehealth visit via video. The visit was scheduled to comply with patient safety concerns in accordance with CDC recommendations.  I spent 30 minutes in total including but not limited to the 30minutes spent in direct conversation with this patient.

## 2021-04-15 ENCOUNTER — TELEPHONE (OUTPATIENT)
Dept: INTERNAL MEDICINE | Facility: CLINIC | Age: 61
End: 2021-04-15

## 2021-05-11 ENCOUNTER — TELEMEDICINE (OUTPATIENT)
Dept: INTERNAL MEDICINE | Facility: CLINIC | Age: 61
End: 2021-05-11

## 2021-05-11 DIAGNOSIS — I10 BENIGN ESSENTIAL HYPERTENSION: ICD-10-CM

## 2021-05-11 DIAGNOSIS — Z71.85 VACCINE COUNSELING: ICD-10-CM

## 2021-05-11 PROCEDURE — 99213 OFFICE O/P EST LOW 20 MIN: CPT | Performed by: INTERNAL MEDICINE

## 2021-05-11 NOTE — PROGRESS NOTES
Subjective   Mirna Saavedra is a 60 y.o. female.   Chief Complaint   Patient presents with   • Hypertension   • covid 19 vaccine questions       History of Present Illness   Wants to discuss wether she should get Covid vaccine or not.  Discuss most people tolerated the Pfzier vaccine with no side effects or minor side effects. Some people reported sore arm and HA with it.  Some had chills and fatigue. I do recommend she get the vaccine with  Her hx htn.   The following portions of the patient's history were reviewed and updated as appropriate: allergies, current medications, past family history, past medical history, past social history, past surgical history and problem list.  Past Medical History:   Diagnosis Date   • Elevated ferritin    • Elevated liver enzymes    • Fatigue    • Hair loss    • Hypertension    • Mitral valve prolapse    • Ovarian cyst    • Peptic ulceration      Past Surgical History:   Procedure Laterality Date   • BREAST BIOPSY Right    • COLONOSCOPY     • EXPLORATORY LAPAROTOMY     • HAND SURGERY Left     torn ligament repair   • HYSTERECTOMY     • HYSTEROSCOPY     • HYSTEROSCOPY     • SKIN BIOPSY      multiple     Family History   Problem Relation Age of Onset   • Migraines Other    • Scoliosis Other         amyotrophic lateral sclerosis    • Tuberculosis Other    • Breast cancer Mother 47   • Breast cancer Sister 48   • ALS Sister    • COPD Father    • Ovarian cancer Neg Hx      Social History     Socioeconomic History   • Marital status:      Spouse name: Not on file   • Number of children: Not on file   • Years of education: Not on file   • Highest education level: Not on file   Tobacco Use   • Smoking status: Former Smoker     Years: 5.00     Types: Cigarettes     Quit date:      Years since quittin.3   • Smokeless tobacco: Never Used   Substance and Sexual Activity   • Alcohol use: Yes     Comment: daily glass of wine   • Drug use: Yes      Types: Marijuana     Comment: smoked for 5 years prior to quitting in 1978   • Sexual activity: Yes     Partners: Male     Birth control/protection: None     Current Outpatient Medications on File Prior to Visit   Medication Sig Dispense Refill   • ALPRAZolam (XANAX) 0.5 MG tablet Take 1 tablet by mouth As Needed for Anxiety. 30 tablet 0   • Dexilant 60 MG capsule TAKE ONE CAPSULE BY MOUTH DAILY 90 capsule 0   • losartan-hydrochlorothiazide (HYZAAR) 100-25 MG per tablet TAKE ONE TABLET BY MOUTH DAILY 90 tablet 0     No current facility-administered medications on file prior to visit.       Review of Systems   Constitutional: Negative for chills, diaphoresis, fatigue, fever and unexpected weight change.   HENT: Negative for congestion, nosebleeds, sinus pressure, sneezing and sore throat.    Respiratory: Negative for cough, chest tightness, shortness of breath and wheezing.    Cardiovascular: Negative for chest pain, palpitations and leg swelling.   Gastrointestinal: Negative for abdominal pain, constipation, diarrhea, nausea and vomiting.   Endocrine: Negative for polydipsia.   Genitourinary: Negative for dysuria, flank pain, frequency, hematuria and urgency.   Musculoskeletal: Negative for arthralgias and neck pain.   Neurological: Negative for speech difficulty, numbness and headaches.   Psychiatric/Behavioral: Negative for agitation, decreased concentration and sleep disturbance.         Objective   Physical Exam  Vitals and nursing note reviewed.   HENT:      Nose: Nose normal.   Eyes:      General: No scleral icterus.  Neurological:      General: No focal deficit present.      Mental Status: She is alert and oriented to person, place, and time.   Psychiatric:         Mood and Affect: Mood normal.         Behavior: Behavior normal.         Assessment/Plan   Diagnoses and all orders for this visit:    1. Benign essential hypertension  Continue losatan hctz 100/25 mg po qd.   2. Vaccine counseling  Discussed the  Pfzier vaccine.  Most common  Side effects sore arm and HA.  Severe reaction: allergic reaction.      This patient has consented to a telehealth visit via video. The visit was scheduled to comply with patient safety concerns in accordance with CDC recommendations.  I spent 20 minutes in total including but not limited to the 20 minutes spent in direct conversation with this patient.

## 2021-05-24 RX ORDER — DEXLANSOPRAZOLE 60 MG/1
CAPSULE, DELAYED RELEASE ORAL
Qty: 90 CAPSULE | Refills: 0 | Status: SHIPPED | OUTPATIENT
Start: 2021-05-24 | End: 2021-10-22 | Stop reason: SDUPTHER

## 2021-05-24 NOTE — TELEPHONE ENCOUNTER
Last Office Visit: 05/11/21  Next Office Visit:    Labs completed in past 6 months? no  Labs completed in past year? yes    Last Refill Date: 02/24/21  Quantity:90  Refills:0    Pharmacy:     Please review pended refill request for any changes needed on refills or quantities. Thank you!

## 2021-06-11 DIAGNOSIS — I10 BENIGN ESSENTIAL HYPERTENSION: ICD-10-CM

## 2021-06-11 RX ORDER — LOSARTAN POTASSIUM AND HYDROCHLOROTHIAZIDE 25; 100 MG/1; MG/1
TABLET ORAL
Qty: 30 TABLET | Refills: 0 | Status: SHIPPED | OUTPATIENT
Start: 2021-06-11 | End: 2021-07-13

## 2021-06-11 NOTE — TELEPHONE ENCOUNTER
Last Office Visit: 05/11/21  Next Office Visit:    Labs completed in past 6 months? no  Labs completed in past year? yes    Last Refill Date: 03/11/21  Quantity:90  Refills:0    Pharmacy:     Please review pended refill request for any changes needed on refills or quantities. Thank you!

## 2021-07-13 DIAGNOSIS — I10 BENIGN ESSENTIAL HYPERTENSION: ICD-10-CM

## 2021-07-13 RX ORDER — LOSARTAN POTASSIUM AND HYDROCHLOROTHIAZIDE 25; 100 MG/1; MG/1
TABLET ORAL
Qty: 30 TABLET | Refills: 0 | Status: SHIPPED | OUTPATIENT
Start: 2021-07-13 | End: 2021-08-18 | Stop reason: SDUPTHER

## 2021-07-13 NOTE — TELEPHONE ENCOUNTER
Last Office Visit: 05/11/21  Next Office Visit:    Labs completed in past 6 months? no  Labs completed in past year? yes    Last Refill Date: 06/11/21  Quantity:30  Refills:0    Pharmacy:     Please review pended refill request for any changes needed on refills or quantities. Thank you!

## 2021-07-20 ENCOUNTER — PRIOR AUTHORIZATION (OUTPATIENT)
Dept: INTERNAL MEDICINE | Facility: CLINIC | Age: 61
End: 2021-07-20

## 2021-07-20 NOTE — TELEPHONE ENCOUNTER
PA approved for Dexilant effective from 07/20/21 to 07/19/2022 for 12 refills.  Authorization number is [id number].    Called pharmacy and notified.

## 2021-08-18 DIAGNOSIS — I10 BENIGN ESSENTIAL HYPERTENSION: ICD-10-CM

## 2021-08-18 RX ORDER — LOSARTAN POTASSIUM AND HYDROCHLOROTHIAZIDE 25; 100 MG/1; MG/1
1 TABLET ORAL DAILY
Qty: 30 TABLET | Refills: 0 | Status: SHIPPED | OUTPATIENT
Start: 2021-08-18 | End: 2021-09-21 | Stop reason: SDUPTHER

## 2021-08-18 NOTE — TELEPHONE ENCOUNTER
Caller: Mirna Saavedra    Relationship: Self    Best call back number: [phone]     Medication needed:   Requested Prescriptions     Pending Prescriptions Disp Refills   • losartan-hydrochlorothiazide (HYZAAR) 100-25 MG per tablet 30 tablet 0     Sig: Take 1 tablet by mouth Daily.       When do you need the refill by: 8/18/21    What additional details did the patient provide when requesting the medication: 1 TABLET LEFT. PATIENT STATES PHARMACY HAS SENT A REQUEST BUT PHARMACY STATES THEY HAVE NOT RECEIVED A RESPONSE.     Does the patient have less than a 3 day supply:  [x] Yes  [] No    What is the patient's preferred pharmacy: [facility] [phone] [facility] [phone] [facility]

## 2021-09-21 DIAGNOSIS — I10 BENIGN ESSENTIAL HYPERTENSION: ICD-10-CM

## 2021-09-21 RX ORDER — LOSARTAN POTASSIUM AND HYDROCHLOROTHIAZIDE 25; 100 MG/1; MG/1
1 TABLET ORAL DAILY
Qty: 30 TABLET | Refills: 0 | Status: SHIPPED | OUTPATIENT
Start: 2021-09-21 | End: 2021-10-22 | Stop reason: SDUPTHER

## 2021-09-21 NOTE — TELEPHONE ENCOUNTER
Caller: Mirna Saavedra    Relationship: Self      Medication requested (name and dosage):    losartan-hydrochlorothiazide (HYZAAR) 100-25 MG per tablet       Pharmacy where request should be sent: [facility] [phone] [facility] [phone]      Additional details provided by patient: OUT OF MEDICATION     Best call back number: [phone]     Does the patient have less than a 3 day supply:  [x] Yes  [] No    Stephanie Ribeiro Rep   09/21/21 15:12 EDT

## 2021-10-22 DIAGNOSIS — I10 BENIGN ESSENTIAL HYPERTENSION: ICD-10-CM

## 2021-10-22 RX ORDER — DEXLANSOPRAZOLE 60 MG/1
1 CAPSULE, DELAYED RELEASE ORAL DAILY
Qty: 90 CAPSULE | Refills: 0 | Status: SHIPPED | OUTPATIENT
Start: 2021-10-22 | End: 2022-01-24 | Stop reason: SDUPTHER

## 2021-10-22 RX ORDER — LOSARTAN POTASSIUM AND HYDROCHLOROTHIAZIDE 25; 100 MG/1; MG/1
1 TABLET ORAL DAILY
Qty: 90 TABLET | Refills: 0 | Status: SHIPPED | OUTPATIENT
Start: 2021-10-22 | End: 2022-01-24 | Stop reason: SDUPTHER

## 2022-01-24 ENCOUNTER — OFFICE VISIT (OUTPATIENT)
Dept: INTERNAL MEDICINE | Facility: CLINIC | Age: 62
End: 2022-01-24

## 2022-01-24 ENCOUNTER — LAB (OUTPATIENT)
Dept: LAB | Facility: HOSPITAL | Age: 62
End: 2022-01-24

## 2022-01-24 VITALS
HEART RATE: 118 BPM | SYSTOLIC BLOOD PRESSURE: 144 MMHG | WEIGHT: 227 LBS | BODY MASS INDEX: 38.76 KG/M2 | HEIGHT: 64 IN | DIASTOLIC BLOOD PRESSURE: 102 MMHG | OXYGEN SATURATION: 98 %

## 2022-01-24 DIAGNOSIS — Z12.11 COLON CANCER SCREENING: ICD-10-CM

## 2022-01-24 DIAGNOSIS — I10 BENIGN ESSENTIAL HYPERTENSION: ICD-10-CM

## 2022-01-24 DIAGNOSIS — R73.03 PREDIABETES: ICD-10-CM

## 2022-01-24 DIAGNOSIS — R73.03 PREDIABETES: Primary | ICD-10-CM

## 2022-01-24 DIAGNOSIS — F41.9 ANXIETY: ICD-10-CM

## 2022-01-24 DIAGNOSIS — K21.00 GASTROESOPHAGEAL REFLUX DISEASE WITH ESOPHAGITIS WITHOUT HEMORRHAGE: ICD-10-CM

## 2022-01-24 DIAGNOSIS — Z79.899 LONG TERM USE OF DRUG: ICD-10-CM

## 2022-01-24 LAB
BASOPHILS # BLD AUTO: 0.04 10*3/MM3 (ref 0–0.2)
BASOPHILS NFR BLD AUTO: 0.6 % (ref 0–1.5)
DEPRECATED RDW RBC AUTO: 44.5 FL (ref 37–54)
EOSINOPHIL # BLD AUTO: 0.09 10*3/MM3 (ref 0–0.4)
EOSINOPHIL NFR BLD AUTO: 1.3 % (ref 0.3–6.2)
ERYTHROCYTE [DISTWIDTH] IN BLOOD BY AUTOMATED COUNT: 13.2 % (ref 12.3–15.4)
HBA1C MFR BLD: 7.47 % (ref 4.8–5.6)
HCT VFR BLD AUTO: 40.5 % (ref 34–46.6)
HGB BLD-MCNC: 13.9 G/DL (ref 12–15.9)
IMM GRANULOCYTES # BLD AUTO: 0.03 10*3/MM3 (ref 0–0.05)
IMM GRANULOCYTES NFR BLD AUTO: 0.4 % (ref 0–0.5)
LYMPHOCYTES # BLD AUTO: 1.75 10*3/MM3 (ref 0.7–3.1)
LYMPHOCYTES NFR BLD AUTO: 24.4 % (ref 19.6–45.3)
MCH RBC QN AUTO: 32.2 PG (ref 26.6–33)
MCHC RBC AUTO-ENTMCNC: 34.3 G/DL (ref 31.5–35.7)
MCV RBC AUTO: 93.8 FL (ref 79–97)
MONOCYTES # BLD AUTO: 0.62 10*3/MM3 (ref 0.1–0.9)
MONOCYTES NFR BLD AUTO: 8.6 % (ref 5–12)
NEUTROPHILS NFR BLD AUTO: 4.65 10*3/MM3 (ref 1.7–7)
NEUTROPHILS NFR BLD AUTO: 64.7 % (ref 42.7–76)
NRBC BLD AUTO-RTO: 0 /100 WBC (ref 0–0.2)
PLATELET # BLD AUTO: 171 10*3/MM3 (ref 140–450)
PMV BLD AUTO: 12.3 FL (ref 6–12)
RBC # BLD AUTO: 4.32 10*6/MM3 (ref 3.77–5.28)
WBC NRBC COR # BLD: 7.18 10*3/MM3 (ref 3.4–10.8)

## 2022-01-24 PROCEDURE — 99214 OFFICE O/P EST MOD 30 MIN: CPT | Performed by: INTERNAL MEDICINE

## 2022-01-24 PROCEDURE — 80053 COMPREHEN METABOLIC PANEL: CPT

## 2022-01-24 PROCEDURE — 85025 COMPLETE CBC W/AUTO DIFF WBC: CPT

## 2022-01-24 PROCEDURE — 83036 HEMOGLOBIN GLYCOSYLATED A1C: CPT

## 2022-01-24 RX ORDER — LOSARTAN POTASSIUM AND HYDROCHLOROTHIAZIDE 25; 100 MG/1; MG/1
1 TABLET ORAL DAILY
Qty: 90 TABLET | Refills: 0 | Status: SHIPPED | OUTPATIENT
Start: 2022-01-24 | End: 2022-04-25 | Stop reason: SDUPTHER

## 2022-01-24 RX ORDER — ALPRAZOLAM 0.5 MG/1
0.5 TABLET ORAL AS NEEDED
Qty: 30 TABLET | Refills: 2 | Status: SHIPPED | OUTPATIENT
Start: 2022-01-24

## 2022-01-24 RX ORDER — DEXLANSOPRAZOLE 60 MG/1
1 CAPSULE, DELAYED RELEASE ORAL DAILY
Qty: 90 CAPSULE | Refills: 0 | Status: SHIPPED | OUTPATIENT
Start: 2022-01-24 | End: 2022-04-25 | Stop reason: SDUPTHER

## 2022-01-24 NOTE — PROGRESS NOTES
Subjective   Mirna Saavedra is a 61 y.o. female.   Chief Complaint   Patient presents with   • Hypertension   • Anxiety   • Prediabetes       History of Present Illness   Here for f/u on chronic conditions: HTN, anxiety, and prediabetes. HTN controlled with losartan hctz. No CP or SOA. No HA. Anxiety controlled with prn xanax. Prediabetes controlled with low carb diet.  GERD controlled with dexilant.  The following portions of the patient's history were reviewed and updated as appropriate: allergies, current medications, past family history, past medical history, past social history, past surgical history and problem list.  Past Medical History:   Diagnosis Date   • Anxiety    • Elevated ferritin    • Elevated liver enzymes    • Fatigue    • GERD (gastroesophageal reflux disease)    • Hair loss    • Headache    • Hypertension    • Infectious viral hepatitis     A   • Low back pain    • Mitral valve prolapse    • Obesity    • Ovarian cyst    • Peptic ulceration    • Pneumonia      Past Surgical History:   Procedure Laterality Date   • BREAST BIOPSY Right    • COLONOSCOPY     • EXPLORATORY LAPAROTOMY     • HAND SURGERY Left     torn ligament repair   • HYSTERECTOMY     • HYSTEROSCOPY     • HYSTEROSCOPY     • SKIN BIOPSY      multiple     Family History   Problem Relation Age of Onset   • Migraines Other    • Scoliosis Other         amyotrophic lateral sclerosis    • Tuberculosis Other    • Breast cancer Mother 47   • Cancer Mother    • Early death Mother    • Miscarriages / Stillbirths Mother    • Breast cancer Sister 48   • ALS Sister    • COPD Father    • Alcohol abuse Father    • Ovarian cancer Neg Hx      Social History     Socioeconomic History   • Marital status:    Tobacco Use   • Smoking status: Former Smoker     Packs/day: 1.00     Years: 5.00     Pack years: 5.00     Types: Cigarettes     Quit date:      Years since quittin.0   •  Smokeless tobacco: Never Used   Substance and Sexual Activity   • Alcohol use: Yes     Comment: daily glass of wine   • Drug use: Yes     Types: Marijuana     Comment: smoked for 5 years prior to quitting in 1978   • Sexual activity: Yes     Partners: Male     Birth control/protection: None     Current Outpatient Medications on File Prior to Visit   Medication Sig Dispense Refill   • [DISCONTINUED] ALPRAZolam (XANAX) 0.5 MG tablet Take 1 tablet by mouth As Needed for Anxiety. 30 tablet 0   • [DISCONTINUED] dexlansoprazole (Dexilant) 60 MG capsule Take 1 capsule by mouth Daily. 90 capsule 0   • [DISCONTINUED] losartan-hydrochlorothiazide (HYZAAR) 100-25 MG per tablet Take 1 tablet by mouth Daily. 90 tablet 0     No current facility-administered medications on file prior to visit.       Current Outpatient Medications on File Prior to Visit   Medication Sig Dispense Refill   • [DISCONTINUED] ALPRAZolam (XANAX) 0.5 MG tablet Take 1 tablet by mouth As Needed for Anxiety. 30 tablet 0   • [DISCONTINUED] dexlansoprazole (Dexilant) 60 MG capsule Take 1 capsule by mouth Daily. 90 capsule 0   • [DISCONTINUED] losartan-hydrochlorothiazide (HYZAAR) 100-25 MG per tablet Take 1 tablet by mouth Daily. 90 tablet 0     No current facility-administered medications on file prior to visit.       Review of Systems   Constitutional: Negative for activity change, appetite change, chills, diaphoresis, fatigue, fever and unexpected weight change.   HENT: Negative for congestion, ear discharge, ear pain, mouth sores, nosebleeds, sinus pressure, sneezing and sore throat.    Eyes: Negative for pain, discharge and itching.   Respiratory: Negative for cough, chest tightness, shortness of breath and wheezing.    Cardiovascular: Negative for chest pain, palpitations and leg swelling.   Gastrointestinal: Negative for abdominal pain, constipation, diarrhea, nausea and vomiting.   Endocrine: Negative for cold intolerance, heat intolerance, polydipsia  "and polyphagia.   Genitourinary: Negative for dysuria, flank pain, frequency, hematuria and urgency.   Musculoskeletal: Negative for arthralgias, back pain, gait problem, myalgias, neck pain and neck stiffness.   Skin: Negative for color change, pallor and rash.   Neurological: Negative for seizures, speech difficulty, numbness and headaches.   Psychiatric/Behavioral: Negative for agitation, confusion, decreased concentration and sleep disturbance. The patient is not nervous/anxious.      BP (!) 144/102   Pulse 118   Ht 162.6 cm (64\")   Wt 103 kg (227 lb)   SpO2 98%   BMI 38.96 kg/m²     Objective   Physical Exam  Vitals reviewed.   Cardiovascular:      Rate and Rhythm: Normal rate and regular rhythm.      Heart sounds: No murmur heard.      Pulmonary:      Effort: No respiratory distress.      Breath sounds: No wheezing or rales.   Neurological:      General: No focal deficit present.      Mental Status: She is alert and oriented to person, place, and time.   Psychiatric:         Mood and Affect: Mood normal.         Behavior: Behavior normal.         Assessment/Plan   Diagnoses and all orders for this visit:    1. Prediabetes (Primary)  -     Hemoglobin A1c; Future  Low carb diet  2. Anxiety  -     ALPRAZolam (XANAX) 0.5 MG tablet; Take 1 tablet by mouth As Needed for Anxiety.  Dispense: 30 tablet; Refill: 2  Uses prn xanax ( 30 tab last over a year)  3. Benign essential hypertension  -     losartan-hydrochlorothiazide (HYZAAR) 100-25 MG per tablet; Take 1 tablet by mouth Daily.  Dispense: 90 tablet; Refill: 0  -     Comprehensive Metabolic Panel; Future  -     CBC & Differential; Future  BP up today because she ran out of her losartan hctz on Sat. She will go to pharm and pick it up today and take dose as soon as gets home and recheck BP an jour after taking med. If SBP> 145/DBP >90 will call office  4. Gastroesophageal reflux disease with esophagitis without hemorrhage  -     dexlansoprazole (Dexilant) 60 " MG capsule; Take 1 capsule by mouth Daily.  Dispense: 90 capsule; Refill: 0  Continue Dexilant 60 mg po qd  5. Long term use of drug  -     Compliance Drug Analysis, Ur - Urine, Clean Catch; Future  -     Compliance Drug Analysis, Ur - Urine, Clean Catch    6. Colon cancer screening  -     Cologuard - Stool, Per Rectum; Future

## 2022-01-25 ENCOUNTER — TELEPHONE (OUTPATIENT)
Dept: INTERNAL MEDICINE | Facility: CLINIC | Age: 62
End: 2022-01-25

## 2022-01-25 LAB
ALBUMIN SERPL-MCNC: 3.9 G/DL (ref 3.5–5.2)
ALBUMIN/GLOB SERPL: 1.1 G/DL
ALP SERPL-CCNC: 112 U/L (ref 39–117)
ALT SERPL W P-5'-P-CCNC: 31 U/L (ref 1–33)
ANION GAP SERPL CALCULATED.3IONS-SCNC: 11.1 MMOL/L (ref 5–15)
AST SERPL-CCNC: 57 U/L (ref 1–32)
BILIRUB SERPL-MCNC: 1 MG/DL (ref 0–1.2)
BUN SERPL-MCNC: 8 MG/DL (ref 8–23)
BUN/CREAT SERPL: 12.7 (ref 7–25)
CALCIUM SPEC-SCNC: 9.3 MG/DL (ref 8.6–10.5)
CHLORIDE SERPL-SCNC: 99 MMOL/L (ref 98–107)
CO2 SERPL-SCNC: 23.9 MMOL/L (ref 22–29)
CREAT SERPL-MCNC: 0.63 MG/DL (ref 0.57–1)
GFR SERPL CREATININE-BSD FRML MDRD: 95 ML/MIN/1.73
GLOBULIN UR ELPH-MCNC: 3.5 GM/DL
GLUCOSE SERPL-MCNC: 158 MG/DL (ref 65–99)
POTASSIUM SERPL-SCNC: 4.2 MMOL/L (ref 3.5–5.2)
PROT SERPL-MCNC: 7.4 G/DL (ref 6–8.5)
SODIUM SERPL-SCNC: 134 MMOL/L (ref 136–145)

## 2022-01-25 NOTE — TELEPHONE ENCOUNTER
----- Message from Chiquita Acosta DO sent at 1/25/2022 12:39 PM EST -----  A1c shows she is diabetic. Start metformin  mg po qd.  Needs OV in 3 m. Does she want to go to diabetic education?

## 2022-01-25 NOTE — TELEPHONE ENCOUNTER
Left message for patient to return call to office regarding lab/ results.  Office Phone number given

## 2022-01-26 DIAGNOSIS — E11.9 NEW ONSET TYPE 2 DIABETES MELLITUS: Primary | ICD-10-CM

## 2022-01-28 LAB — DRUGS UR: NORMAL

## 2022-02-07 ENCOUNTER — HOSPITAL ENCOUNTER (OUTPATIENT)
Dept: DIABETES SERVICES | Facility: HOSPITAL | Age: 62
Setting detail: RECURRING SERIES
Discharge: HOME | End: 2022-02-07

## 2022-02-07 PROCEDURE — G0108 DIAB MANAGE TRN  PER INDIV: HCPCS

## 2022-02-07 NOTE — CONSULTS
Diabetes Education    Patient Name:  Mirna Saavedra  YOB: 1960  MRN: [id number]  Admit Date:  2/7/2022      This medical referred consult was provided as a telephone call, tele-health or e-visit, as patient is unable to attend an in-office appointment due to the COVID-19 crisis. Consent for treatment was given verbally.  Ms. Saavedra attended her scheduled diabetes education visit with the RN and RD.  Please see media tab for assessment and notes if you use EP[employer]. If you are not an [employer] user a copy of patient's assessment and notes will be sent per routine. Thank you for the referral.       Electronically signed by:  Linnette Springer, MSN, APRN  02/07/22 15:25 EST

## 2022-02-09 ENCOUNTER — TELEPHONE (OUTPATIENT)
Dept: INTERNAL MEDICINE | Facility: CLINIC | Age: 62
End: 2022-02-09

## 2022-02-09 RX ORDER — BLOOD-GLUCOSE METER
1 KIT MISCELLANEOUS 2 TIMES DAILY
Qty: 1 EACH | Refills: 0 | Status: SHIPPED | OUTPATIENT
Start: 2022-02-09

## 2022-02-09 RX ORDER — LANCETS 28 GAUGE
EACH MISCELLANEOUS
Qty: 100 EACH | Refills: 12 | Status: SHIPPED | OUTPATIENT
Start: 2022-02-09

## 2022-02-09 NOTE — TELEPHONE ENCOUNTER
THE PATIENT STATES THAT A NURSE AT THE [facility] DI[facility] NUTR[facility] OFFICE NAMED [facility] ADVISED HER TO CALL HER PRIMARY DOCTOR AND REQUEST A BLOOD GLUCOSE METER AND ALL OF THE OTHER SUPPLIES THAT SHE WOULD NEED TO BE ABLE TO TEST HER BLOOD SUGAR.  THE PATIENT WOULD LIKE TO PRESCRIPTION SENT TO [facility] IF POSSIBLE PLEASE CALL PATIENT TO LET HER KNOW IF THAT CAN BE DONE     PATIENT CALL BACK NUMBER -[phone]

## 2022-02-09 NOTE — TELEPHONE ENCOUNTER
Caller: Mirna Saavedra    Relationship: Self    Best call back number:[phone]    What does billing need from the patient: THE PATIENT REC'D A LARGE BILL FROM [facility] FROM 01-24-22 VISIT.    SHE STATES THAT SHE HAS NEVER GOTTEN  BILL FOR LABS. SPOKE TO BILLING OFFICE AND THEY SUGGESTED CALLING YOUR OFFICE DIRECT AS MAYBE NEEDS TO BE RE CODED OR SOMETHING.    PATIENT ASKED FOR A CALL BACK TO DISCUSS.

## 2022-03-14 ENCOUNTER — HOSPITAL ENCOUNTER (OUTPATIENT)
Dept: DIABETES SERVICES | Facility: HOSPITAL | Age: 62
Setting detail: RECURRING SERIES
Discharge: HOME | End: 2022-03-14

## 2022-03-14 NOTE — CONSULTS
Diabetes Education    Patient Name:  Mirna Saavedra  YOB: 1960  MRN: [id number]  Admit Date:  3/14/2022        Pt attended follow up visit for diabetes education; 45 minute visit with KAYLEEN WILKINS over the phone. Please see media tab for full assessment and notes. Thank you for the referral.      Electronically signed by:  Shannon Porras RN, KAYLEEN  03/14/22 16:18 EDT

## 2022-04-06 ENCOUNTER — TELEPHONE (OUTPATIENT)
Dept: INTERNAL MEDICINE | Facility: CLINIC | Age: 62
End: 2022-04-06

## 2022-04-06 DIAGNOSIS — E11.9 CONTROLLED TYPE 2 DIABETES MELLITUS WITHOUT COMPLICATION, WITHOUT LONG-TERM CURRENT USE OF INSULIN: Primary | ICD-10-CM

## 2022-04-06 RX ORDER — FLASH GLUCOSE SENSOR
1 KIT MISCELLANEOUS
Qty: 2 EACH | Refills: 11 | Status: SHIPPED | OUTPATIENT
Start: 2022-04-06

## 2022-04-06 RX ORDER — FLASH GLUCOSE SCANNING READER
1 EACH MISCELLANEOUS DAILY
Qty: 1 EACH | Refills: 0 | Status: SHIPPED | OUTPATIENT
Start: 2022-04-06

## 2022-04-08 ENCOUNTER — TELEMEDICINE (OUTPATIENT)
Dept: SLEEP MEDICINE | Facility: HOSPITAL | Age: 62
End: 2022-04-08

## 2022-04-08 VITALS — WEIGHT: 220 LBS | HEIGHT: 64 IN | BODY MASS INDEX: 37.56 KG/M2

## 2022-04-08 DIAGNOSIS — G47.33 OBSTRUCTIVE SLEEP APNEA, ADULT: Primary | ICD-10-CM

## 2022-04-08 PROCEDURE — 99213 OFFICE O/P EST LOW 20 MIN: CPT | Performed by: NURSE PRACTITIONER

## 2022-04-08 NOTE — PROGRESS NOTES
Chief Complaint:   Chief Complaint   Patient presents with   • Follow-up       HPI:    Mirna Saavedra is a 61 y.o. female here for follow-up of sleep apnea.  Patient was last seen for 821.  Patient continues to do well with CPAP therapy.  Patient is sleeping 7 to 8 hours nightly and does feel refreshed upon awakening.  She goes to sleep quickly and will get up x1 in the night.  Patient has an [place] score of 4/24.  Patient states she has received her new Linda machine.  She does feel CPAP is beneficial.  Patient has no concerns or complaints regarding CPAP and wishes to continue therapy.        Current medications are:   Current Outpatient Medications:   •  ALPRAZolam (XANAX) 0.5 MG tablet, Take 1 tablet by mouth As Needed for Anxiety., Disp: 30 tablet, Rfl: 2  •  Continuous Blood Gluc  (FreeStyle Kimber 14 Day [place]) device, 1 each Daily., Disp: 1 each, Rfl: 0  •  Continuous Blood Gluc Sensor (FreeStyle Kimber 14 Day Sensor) misc, 1 each Every 14 (Fourteen) Days., Disp: 2 each, Rfl: 11  •  dexlansoprazole (Dexilant) 60 MG capsule, Take 1 capsule by mouth Daily., Disp: 90 capsule, Rfl: 0  •  glucose blood test strip, Use as instructed, Disp: 100 each, Rfl: 12  •  glucose monitor monitoring kit, 1 each 2 (Two) Times a Day., Disp: 1 each, Rfl: 0  •  Lancets (freestyle) lancets, Use as directed, Disp: 100 each, Rfl: 12  •  losartan-hydrochlorothiazide (HYZAAR) 100-25 MG per tablet, Take 1 tablet by mouth Daily., Disp: 90 tablet, Rfl: 0  •  metFORMIN ER (GLUCOPHAGE-XR) 500 MG 24 hr tablet, Take 1 tablet by mouth Daily With Breakfast., Disp: 90 tablet, Rfl: 0.      The patient's relevant past medical, surgical, family and social history were reviewed and updated in Epic as appropriate.       Review of Systems   Eyes: Positive for visual disturbance.   Respiratory: Positive for apnea.    Gastrointestinal:        Heartburn   Endocrine: Positive for polydipsia.   Musculoskeletal: Positive for neck pain.    Allergic/Immunologic: Positive for environmental allergies.   Neurological: Positive for headaches.   Psychiatric/Behavioral: Positive for sleep disturbance. The patient is nervous/anxious.    All other systems reviewed and are negative.        Objective:    Physical Exam  Constitutional:       Appearance: Normal appearance.   HENT:      Head: Normocephalic and atraumatic.      Mouth/Throat:      Comments: Mallampati 2 anatomy    Pulmonary:      Effort: Pulmonary effort is normal. No respiratory distress.   Neurological:      Mental Status: She is alert and oriented to person, place, and time.   Psychiatric:         Mood and Affect: Mood normal.         Behavior: Behavior normal.         Thought Content: Thought content normal.         Judgment: Judgment normal.     90?90 days of use  Greater than 4-hour use 100%  90% pressure 11.8  AHI of 0.2  Settings 10-18      ASSESSMENT/PLAN    Diagnoses and all orders for this visit:    1. Obstructive sleep apnea, adult (Primary)  -     PAP Therapy            1. Counseled patient regarding multimodal approach with healthy nutrition, healthy sleep, regular physical activity, social activities, counseling, and medications. Encouraged to practice lateral sleep position. Avoid alcohol and sedatives close to bedtime.  2.   Refill supplies x1 year.  Return to clinic 1 year or sooner symptoms warrant.  Patient gave verbal consent for video visit.  I have reviewed the results of my evaluation and impression and discussed my recommendations in detail with the patient.      Signed by  GREG Tuttle    April 8, 2022      CC: Chiquita Acosta DO          No ref. provider found

## 2022-04-11 ENCOUNTER — TELEPHONE (OUTPATIENT)
Dept: INTERNAL MEDICINE | Facility: CLINIC | Age: 62
End: 2022-04-11

## 2022-04-11 NOTE — TELEPHONE ENCOUNTER
PATIENT STATES THAT THE DOCTOR CALLED IN A FREESTYLE AMINATA CONTINUOUS MONITORING SYSTEM TO [facility]. THE PATIENT STATES THAT SHE WAS TOLD THAT THE FREESTYLE AMINATA WAS NOT COVERED BY INSURANCE THE PATIENT WAS ADVISED BY THE PHARMACY TO HAVE THE DOCTOR SEND IN A PRIOR AUTHORIZATION TO SEE IF THE FREESTYLE AMINATA CAN BE COVERED THE PATIENT WOULD LIKE TO KNOW IF THAT CAN BE DONE       CALL [phone]

## 2022-04-14 ENCOUNTER — PATIENT MESSAGE (OUTPATIENT)
Dept: INTERNAL MEDICINE | Facility: CLINIC | Age: 62
End: 2022-04-14

## 2022-04-14 ENCOUNTER — HOSPITAL ENCOUNTER (OUTPATIENT)
Dept: DIABETES SERVICES | Facility: HOSPITAL | Age: 62
Setting detail: RECURRING SERIES
Discharge: HOME | End: 2022-04-14

## 2022-04-14 ENCOUNTER — PRIOR AUTHORIZATION (OUTPATIENT)
Dept: INTERNAL MEDICINE | Facility: CLINIC | Age: 62
End: 2022-04-14

## 2022-04-25 DIAGNOSIS — K21.00 GASTROESOPHAGEAL REFLUX DISEASE WITH ESOPHAGITIS WITHOUT HEMORRHAGE: ICD-10-CM

## 2022-04-25 DIAGNOSIS — I10 BENIGN ESSENTIAL HYPERTENSION: ICD-10-CM

## 2022-04-25 RX ORDER — LOSARTAN POTASSIUM AND HYDROCHLOROTHIAZIDE 25; 100 MG/1; MG/1
1 TABLET ORAL DAILY
Qty: 90 TABLET | Refills: 0 | Status: SHIPPED | OUTPATIENT
Start: 2022-04-25 | End: 2022-07-25 | Stop reason: SDUPTHER

## 2022-04-25 RX ORDER — DEXLANSOPRAZOLE 60 MG/1
1 CAPSULE, DELAYED RELEASE ORAL DAILY
Qty: 90 CAPSULE | Refills: 0 | Status: SHIPPED | OUTPATIENT
Start: 2022-04-25 | End: 2022-07-25 | Stop reason: SDUPTHER

## 2022-04-25 RX ORDER — METFORMIN HYDROCHLORIDE 500 MG/1
500 TABLET, EXTENDED RELEASE ORAL
Qty: 90 TABLET | Refills: 0 | Status: SHIPPED | OUTPATIENT
Start: 2022-04-25 | End: 2022-07-25 | Stop reason: SDUPTHER

## 2022-05-24 ENCOUNTER — LAB (OUTPATIENT)
Dept: LAB | Facility: HOSPITAL | Age: 62
End: 2022-05-24

## 2022-05-24 ENCOUNTER — OFFICE VISIT (OUTPATIENT)
Dept: INTERNAL MEDICINE | Facility: CLINIC | Age: 62
End: 2022-05-24

## 2022-05-24 VITALS
WEIGHT: 230 LBS | SYSTOLIC BLOOD PRESSURE: 131 MMHG | OXYGEN SATURATION: 97 % | TEMPERATURE: 97.3 F | HEART RATE: 110 BPM | BODY MASS INDEX: 39.48 KG/M2 | DIASTOLIC BLOOD PRESSURE: 81 MMHG

## 2022-05-24 DIAGNOSIS — Z12.31 ENCOUNTER FOR SCREENING MAMMOGRAM FOR BREAST CANCER: ICD-10-CM

## 2022-05-24 DIAGNOSIS — I10 BENIGN ESSENTIAL HYPERTENSION: Primary | ICD-10-CM

## 2022-05-24 DIAGNOSIS — E11.9 CONTROLLED TYPE 2 DIABETES MELLITUS WITHOUT COMPLICATION, WITHOUT LONG-TERM CURRENT USE OF INSULIN: ICD-10-CM

## 2022-05-24 DIAGNOSIS — K21.00 GASTROESOPHAGEAL REFLUX DISEASE WITH ESOPHAGITIS WITHOUT HEMORRHAGE: ICD-10-CM

## 2022-05-24 DIAGNOSIS — I10 BENIGN ESSENTIAL HYPERTENSION: ICD-10-CM

## 2022-05-24 LAB
BASOPHILS # BLD AUTO: 0.04 10*3/MM3 (ref 0–0.2)
BASOPHILS NFR BLD AUTO: 0.6 % (ref 0–1.5)
DEPRECATED RDW RBC AUTO: 45.6 FL (ref 37–54)
EOSINOPHIL # BLD AUTO: 0.07 10*3/MM3 (ref 0–0.4)
EOSINOPHIL NFR BLD AUTO: 1.1 % (ref 0.3–6.2)
ERYTHROCYTE [DISTWIDTH] IN BLOOD BY AUTOMATED COUNT: 14.3 % (ref 12.3–15.4)
EXPIRATION DATE: NORMAL
HBA1C MFR BLD: 6.4 %
HCT VFR BLD AUTO: 35.4 % (ref 34–46.6)
HGB BLD-MCNC: 11 G/DL (ref 12–15.9)
LYMPHOCYTES # BLD AUTO: 1.39 10*3/MM3 (ref 0.7–3.1)
LYMPHOCYTES NFR BLD AUTO: 22.6 % (ref 19.6–45.3)
Lab: NORMAL
MCH RBC QN AUTO: 26.9 PG (ref 26.6–33)
MCHC RBC AUTO-ENTMCNC: 31.1 G/DL (ref 31.5–35.7)
MCV RBC AUTO: 86.6 FL (ref 79–97)
MONOCYTES # BLD AUTO: 0.64 10*3/MM3 (ref 0.1–0.9)
MONOCYTES NFR BLD AUTO: 10.4 % (ref 5–12)
NEUTROPHILS NFR BLD AUTO: 4.01 10*3/MM3 (ref 1.7–7)
NEUTROPHILS NFR BLD AUTO: 65.1 % (ref 42.7–76)
PLATELET # BLD AUTO: 202 10*3/MM3 (ref 140–450)
PMV BLD AUTO: 12.7 FL (ref 6–12)
RBC # BLD AUTO: 4.09 10*6/MM3 (ref 3.77–5.28)
WBC NRBC COR # BLD: 6.16 10*3/MM3 (ref 3.4–10.8)

## 2022-05-24 PROCEDURE — 80061 LIPID PANEL: CPT

## 2022-05-24 PROCEDURE — 83036 HEMOGLOBIN GLYCOSYLATED A1C: CPT | Performed by: INTERNAL MEDICINE

## 2022-05-24 PROCEDURE — 99214 OFFICE O/P EST MOD 30 MIN: CPT | Performed by: INTERNAL MEDICINE

## 2022-05-24 PROCEDURE — 85025 COMPLETE CBC W/AUTO DIFF WBC: CPT

## 2022-05-24 PROCEDURE — 80053 COMPREHEN METABOLIC PANEL: CPT

## 2022-05-24 PROCEDURE — 3044F HG A1C LEVEL LT 7.0%: CPT | Performed by: INTERNAL MEDICINE

## 2022-05-24 NOTE — PROGRESS NOTES
Subjective   Mirna Saavedra is a 61 y.o. female.   Chief Complaint   Patient presents with   • Hypertension   • Heartburn   • Diabetes       History of Present Illness   Here for f/u on chronic conditions: HTN, prediabetes, and GERD. HTN controlled with Losartan HCTZ. No CP or SOA. Diabetes controlled with Metformin and low carb diet.  GERD controlled with dexilant.   The following portions of the patient's history were reviewed and updated as appropriate: allergies, current medications, past family history, past medical history, past social history, past surgical history and problem list.  Past Medical History:   Diagnosis Date   • Anxiety    • Elevated ferritin    • Elevated liver enzymes    • Fatigue    • GERD (gastroesophageal reflux disease)    • Hair loss    • Headache    • Hypertension    • Infectious viral hepatitis     A   • Low back pain    • Mitral valve prolapse    • Obesity    • Ovarian cyst    • Peptic ulceration    • Pneumonia      Past Surgical History:   Procedure Laterality Date   • BREAST BIOPSY Right    • COLONOSCOPY     • EXPLORATORY LAPAROTOMY     • HAND SURGERY Left     torn ligament repair   • HYSTERECTOMY     • HYSTEROSCOPY     • HYSTEROSCOPY     • SKIN BIOPSY      multiple     Family History   Problem Relation Age of Onset   • Migraines Other    • Scoliosis Other         amyotrophic lateral sclerosis    • Tuberculosis Other    • Breast cancer Mother 47   • Cancer Mother    • Early death Mother    • Miscarriages / Stillbirths Mother    • Breast cancer Sister 48   • ALS Sister    • COPD Father    • Alcohol abuse Father    • Ovarian cancer Neg Hx      Social History     Socioeconomic History   • Marital status:    Tobacco Use   • Smoking status: Former Smoker     Packs/day: 1.00     Years: 5.00     Pack years: 5.00     Types: Cigarettes     Quit date:      Years since quittin.4   • Smokeless tobacco: Never Used   Substance  and Sexual Activity   • Alcohol use: Yes     Comment: daily glass of wine   • Drug use: Yes     Types: Marijuana     Comment: smoked for 5 years prior to quitting in 1978   • Sexual activity: Yes     Partners: Male     Birth control/protection: None     Current Outpatient Medications on File Prior to Visit   Medication Sig Dispense Refill   • ALPRAZolam (XANAX) 0.5 MG tablet Take 1 tablet by mouth As Needed for Anxiety. 30 tablet 2   • Continuous Blood Gluc  (FreeStyle Kimber 14 Day [place]) device 1 each Daily. 1 each 0   • Continuous Blood Gluc Sensor (FreeStyle Kimber 14 Day Sensor) misc 1 each Every 14 (Fourteen) Days. 2 each 11   • dexlansoprazole (Dexilant) 60 MG capsule Take 1 capsule by mouth Daily. 90 capsule 0   • glucose blood test strip Use as instructed 100 each 12   • glucose monitor monitoring kit 1 each 2 (Two) Times a Day. 1 each 0   • Lancets (freestyle) lancets Use as directed 100 each 12   • losartan-hydrochlorothiazide (HYZAAR) 100-25 MG per tablet Take 1 tablet by mouth Daily. 90 tablet 0   • metFORMIN ER (GLUCOPHAGE-XR) 500 MG 24 hr tablet Take 1 tablet by mouth Daily With Breakfast. 90 tablet 0     No current facility-administered medications on file prior to visit.       Review of Systems   Constitutional: Positive for fatigue.   Respiratory: Negative for cough and shortness of breath.    Cardiovascular: Negative for chest pain.   Gastrointestinal: Negative for diarrhea, nausea and vomiting.   Endocrine: Negative for polydipsia, polyphagia and polyuria.   Skin: Negative for pallor.   Neurological: Negative for dizziness, tremors, seizures, speech difficulty, weakness and headaches.   Psychiatric/Behavioral: Negative for confusion. The patient is nervous/anxious.      /81   Pulse 110   Temp 97.3 °F (36.3 °C)   Wt 104 kg (230 lb)   SpO2 97%   BMI 39.48 kg/m²     Objective   Physical Exam  Vitals reviewed.   Cardiovascular:      Rate and Rhythm: Normal rate and regular rhythm.       Heart sounds: No murmur heard.  Pulmonary:      Effort: No respiratory distress.      Breath sounds: No wheezing or rales.   Neurological:      General: No focal deficit present.      Mental Status: She is alert and oriented to person, place, and time.   Psychiatric:         Mood and Affect: Mood normal.         Behavior: Behavior normal.         Assessment & Plan   Diagnoses and all orders for this visit:    1. Benign essential hypertension (Primary)  -     Microalbumin / Creatinine Urine Ratio - Urine, Clean Catch  -     Comprehensive Metabolic Panel; Future  -     Lipid Panel; Future  -     CBC & Differential; Future  Continue Lisinopril HCTZ 100/25 mg po qd  2. Gastroesophageal reflux disease with esophagitis without hemorrhage  Continue Dexilant 60 mg po qd  3. Controlled type 2 diabetes mellitus without complication, without long-term current use of insulin (HCC)  -     Lipid Panel; Future  -     POC Glycosylated Hemoglobin (Hb A1C)  A1c 6.4. Continue metformin  mg po qd  4. Encounter for screening mammogram for breast cancer  -     Mammo screening digital tomosynthesis bilateral w CAD; Future

## 2022-05-25 DIAGNOSIS — D64.9 NORMOCYTIC ANEMIA: Primary | ICD-10-CM

## 2022-05-25 DIAGNOSIS — R74.8 ELEVATED LIVER ENZYMES: ICD-10-CM

## 2022-05-25 LAB
ALBUMIN SERPL-MCNC: 4 G/DL (ref 3.5–5.2)
ALBUMIN/GLOB SERPL: 1.1 G/DL
ALP SERPL-CCNC: 91 U/L (ref 39–117)
ALT SERPL W P-5'-P-CCNC: 28 U/L (ref 1–33)
ANION GAP SERPL CALCULATED.3IONS-SCNC: 13.7 MMOL/L (ref 5–15)
AST SERPL-CCNC: 45 U/L (ref 1–32)
BILIRUB SERPL-MCNC: 0.9 MG/DL (ref 0–1.2)
BUN SERPL-MCNC: 9 MG/DL (ref 8–23)
BUN/CREAT SERPL: 15.5 (ref 7–25)
CALCIUM SPEC-SCNC: 9.7 MG/DL (ref 8.6–10.5)
CHLORIDE SERPL-SCNC: 95 MMOL/L (ref 98–107)
CHOLEST SERPL-MCNC: 195 MG/DL (ref 0–200)
CO2 SERPL-SCNC: 22.3 MMOL/L (ref 22–29)
CREAT SERPL-MCNC: 0.58 MG/DL (ref 0.57–1)
EGFRCR SERPLBLD CKD-EPI 2021: 101.8 ML/MIN/1.73
GLOBULIN UR ELPH-MCNC: 3.6 GM/DL
GLUCOSE SERPL-MCNC: 168 MG/DL (ref 65–99)
HDLC SERPL-MCNC: 73 MG/DL (ref 40–60)
LDLC SERPL CALC-MCNC: 102 MG/DL (ref 0–100)
LDLC/HDLC SERPL: 1.36 {RATIO}
POTASSIUM SERPL-SCNC: 3.8 MMOL/L (ref 3.5–5.2)
PROT SERPL-MCNC: 7.6 G/DL (ref 6–8.5)
SODIUM SERPL-SCNC: 131 MMOL/L (ref 136–145)
TRIGL SERPL-MCNC: 115 MG/DL (ref 0–150)
VLDLC SERPL-MCNC: 20 MG/DL (ref 5–40)

## 2022-05-26 ENCOUNTER — TELEPHONE (OUTPATIENT)
Dept: INTERNAL MEDICINE | Facility: CLINIC | Age: 62
End: 2022-05-26

## 2022-05-26 NOTE — TELEPHONE ENCOUNTER
----- Message from Chiquita Acosta DO sent at 5/25/2022  1:59 PM EDT -----  Mild anemia. Does she have her colonoscopy dontrell yet?  Mild increase in ast  Mild decrease in sodium. Needs repeat labs 3 weeks.

## 2022-05-26 NOTE — TELEPHONE ENCOUNTER
Left message for patient to return call to office regarding lab results.  Office Phone number given

## 2022-06-07 ENCOUNTER — TELEPHONE (OUTPATIENT)
Dept: INTERNAL MEDICINE | Facility: CLINIC | Age: 62
End: 2022-06-07

## 2022-06-07 DIAGNOSIS — N64.4 BREAST PAIN: Primary | ICD-10-CM

## 2022-06-07 NOTE — TELEPHONE ENCOUNTER
Pt called to schedule her screening mammogram.  Central Scheduling told her to contact our office as she will need an order for a diagnostic mammogram. .    Pt states she is experiencing breast changes.  Pt states that the is pain on the side that is continually monitored & the pain comes & goes.

## 2022-07-15 ENCOUNTER — TRANSCRIBE ORDERS (OUTPATIENT)
Dept: MAMMOGRAPHY | Facility: HOSPITAL | Age: 62
End: 2022-07-15

## 2022-07-15 ENCOUNTER — HOSPITAL ENCOUNTER (OUTPATIENT)
Dept: MAMMOGRAPHY | Facility: HOSPITAL | Age: 62
Discharge: HOME | End: 2022-07-15
Admitting: INTERNAL MEDICINE

## 2022-07-15 DIAGNOSIS — N64.4 BREAST PAIN: ICD-10-CM

## 2022-07-15 DIAGNOSIS — R92.8 ABNORMAL MAMMOGRAM: Primary | ICD-10-CM

## 2022-07-15 PROCEDURE — 77062 BREAST TOMOSYNTHESIS BI: CPT | Performed by: RADIOLOGY

## 2022-07-15 PROCEDURE — 77066 DX MAMMO INCL CAD BI: CPT

## 2022-07-15 PROCEDURE — G0279 TOMOSYNTHESIS, MAMMO: HCPCS

## 2022-07-15 PROCEDURE — 77066 DX MAMMO INCL CAD BI: CPT | Performed by: RADIOLOGY

## 2022-07-19 ENCOUNTER — TELEPHONE (OUTPATIENT)
Dept: INTERNAL MEDICINE | Facility: CLINIC | Age: 62
End: 2022-07-19

## 2022-07-19 NOTE — TELEPHONE ENCOUNTER
----- Message from Chiquita Acosta DO sent at 7/19/2022  9:08 AM EDT -----  Breast center should be calling her to [facility] breast biopsy

## 2022-07-25 DIAGNOSIS — K21.00 GASTROESOPHAGEAL REFLUX DISEASE WITH ESOPHAGITIS WITHOUT HEMORRHAGE: ICD-10-CM

## 2022-07-25 DIAGNOSIS — I10 BENIGN ESSENTIAL HYPERTENSION: ICD-10-CM

## 2022-07-25 RX ORDER — LOSARTAN POTASSIUM AND HYDROCHLOROTHIAZIDE 25; 100 MG/1; MG/1
1 TABLET ORAL DAILY
Qty: 90 TABLET | Refills: 0 | Status: SHIPPED | OUTPATIENT
Start: 2022-07-25 | End: 2022-10-28 | Stop reason: SDUPTHER

## 2022-07-25 RX ORDER — DEXLANSOPRAZOLE 60 MG/1
1 CAPSULE, DELAYED RELEASE ORAL DAILY
Qty: 90 CAPSULE | Refills: 0 | Status: SHIPPED | OUTPATIENT
Start: 2022-07-25 | End: 2023-01-31 | Stop reason: SDUPTHER

## 2022-07-25 RX ORDER — METFORMIN HYDROCHLORIDE 500 MG/1
500 TABLET, EXTENDED RELEASE ORAL
Qty: 90 TABLET | Refills: 0 | Status: SHIPPED | OUTPATIENT
Start: 2022-07-25 | End: 2022-10-28 | Stop reason: SDUPTHER

## 2022-08-08 ENCOUNTER — HOSPITAL ENCOUNTER (OUTPATIENT)
Dept: MAMMOGRAPHY | Facility: HOSPITAL | Age: 62
Discharge: HOME | End: 2022-08-08

## 2022-08-08 DIAGNOSIS — R92.8 ABNORMAL MAMMOGRAM: ICD-10-CM

## 2022-08-08 PROCEDURE — 88305 TISSUE EXAM BY PATHOLOGIST: CPT | Performed by: INTERNAL MEDICINE

## 2022-08-08 PROCEDURE — 77065 DX MAMMO INCL CAD UNI: CPT | Performed by: RADIOLOGY

## 2022-08-08 PROCEDURE — 19081 BX BREAST 1ST LESION STRTCTC: CPT | Performed by: RADIOLOGY

## 2022-08-08 PROCEDURE — 0 LIDOCAINE 1 % SOLUTION: Performed by: RADIOLOGY

## 2022-08-08 PROCEDURE — A4648 IMPLANTABLE TISSUE MARKER: HCPCS

## 2022-08-08 RX ORDER — LIDOCAINE HYDROCHLORIDE AND EPINEPHRINE 10; 10 MG/ML; UG/ML
20 INJECTION, SOLUTION INFILTRATION; PERINEURAL ONCE
Status: COMPLETED | OUTPATIENT
Start: 2022-08-08 | End: 2022-08-08

## 2022-08-08 RX ORDER — LIDOCAINE HYDROCHLORIDE 10 MG/ML
5 INJECTION, SOLUTION INFILTRATION; PERINEURAL ONCE
Status: COMPLETED | OUTPATIENT
Start: 2022-08-08 | End: 2022-08-08

## 2022-08-08 RX ORDER — LIDOCAINE HYDROCHLORIDE AND EPINEPHRINE 10; 10 MG/ML; UG/ML
10 INJECTION, SOLUTION INFILTRATION; PERINEURAL ONCE
Status: COMPLETED | OUTPATIENT
Start: 2022-08-08 | End: 2022-08-08

## 2022-08-08 RX ADMIN — LIDOCAINE HYDROCHLORIDE AND EPINEPHRINE 20 ML: 10; 10 INJECTION, SOLUTION INFILTRATION; PERINEURAL at 14:58

## 2022-08-08 RX ADMIN — Medication 5 ML: at 14:12

## 2022-08-08 RX ADMIN — LIDOCAINE HYDROCHLORIDE,EPINEPHRINE BITARTRATE 6 ML: 10; .01 INJECTION, SOLUTION INFILTRATION; PERINEURAL at 14:57

## 2022-08-08 NOTE — PROGRESS NOTES
Alert and orientated. Denies discomfort, No active bleeding, Steri-strips not visualized, gauze dressing intact. Ice packs given. Verbalizes and demonstrates understanding of post-care instructions, written copy given.

## 2022-08-11 LAB
CYTO UR: NORMAL
LAB AP CASE REPORT: NORMAL
LAB AP CLINICAL INFORMATION: NORMAL
LAB AP DIAGNOSIS COMMENT: NORMAL
PATH REPORT.FINAL DX SPEC: NORMAL
PATH REPORT.GROSS SPEC: NORMAL

## 2022-08-12 ENCOUNTER — TELEPHONE (OUTPATIENT)
Dept: MAMMOGRAPHY | Facility: HOSPITAL | Age: 62
End: 2022-08-12

## 2022-08-12 NOTE — TELEPHONE ENCOUNTER
Patient notified of pathology results and recommendations. Verbalizes understanding. Denies discomfort. Denies signs and symptoms of infection. Patient desires Dr TREMAYNE Cummins for surgical consult. Patient will be notified when an appointment when scheduled. Verbalizes understanding.

## 2022-08-15 ENCOUNTER — TELEPHONE (OUTPATIENT)
Dept: INTERNAL MEDICINE | Facility: CLINIC | Age: 62
End: 2022-08-15

## 2022-08-15 NOTE — TELEPHONE ENCOUNTER
Spoke with Mirna and she stated she has been notified of the results and they are supposed to call her to schedule. If she doesn't hear back today she will call them.

## 2022-08-15 NOTE — TELEPHONE ENCOUNTER
----- Message from Chiquita Acosta DO sent at 8/15/2022  1:43 PM EDT -----  Has the breast center set her up to have an excision of the area they are concerned about

## 2022-08-16 ENCOUNTER — TELEPHONE (OUTPATIENT)
Dept: MAMMOGRAPHY | Facility: HOSPITAL | Age: 62
End: 2022-08-16

## 2022-08-16 NOTE — TELEPHONE ENCOUNTER
Attempted to notify patient of surgical consult appointment.  Left message on patient's voicemail to return my call.

## 2022-09-19 ENCOUNTER — CLINICAL SUPPORT (OUTPATIENT)
Dept: GENETICS | Facility: HOSPITAL | Age: 62
End: 2022-09-19

## 2022-09-19 DIAGNOSIS — Z13.79 GENETIC TESTING: Primary | ICD-10-CM

## 2022-09-19 DIAGNOSIS — Z80.3 FAMILY HISTORY OF BREAST CANCER: ICD-10-CM

## 2022-09-19 NOTE — PROGRESS NOTES
Mirna Saavedra, a 62-year-old female, was referred for genetic counseling due to a personal history of atypical ductal hyperplasia (ADH) and a family history of breast cancer.  Genetic counseling was provided via telehealth.  Ms. Saavedra was 11 years old at menarche and is nulliparous.  She is postmenopausal and had a partial hysterectomy 15-16 years ago. She still retains her ovaries. Her last colonoscopy was around 10 years ago and she reports no history of polyps. She was recently identified to have ADH with suspicion for invasive carcinoma in the right breast at age 62. She is scheduled for a breast MRI and surgery is pending further work up. She was interested in discussing her risk to develop breast cancer and the likelihood for a hereditary cancer syndrome.  Ms. Saavedra opted to pursue comprehensive testing via the CancerNext panel ordered through [employer] which analyzes 36 genes that are associated with increased risk for cancer.  Blood draw will be coordinated at [facility].Results from the high/moderate risk breast cancer genes are expected in 10 days, and results from the remainder of the panel are expected in 2-3 weeks.    PERTINENT FAMILY HISTORY: (See attached pedigree)   Mother: Breast cancer, 47  Sister:  Breast cancer, 48  Pat Aunt: Possible breast cancer     We do not have medical records regarding any of these diagnoses.      RISK ASSESSMENT:  Ms. Saavedra’s family history of breast cancer let to concern regarding a hereditary risk factor. Ms. Saavedra meets NCCN guidelines criteria for BRCA1/2 testing based on having a close relative diagnosed with breast cancer at or under age 50.  We also discussed that if testing is negative, Ms. Saavedra’s breast cancer risk would still be impacted by the identified ADH.  If genetic testing comes back negative, risk modeling will be utilized to estimate her lifetime breast cancer risk based on her family history and personal history of atypical hyperplasia. This risk  assessment is based on the family history information provided at the time of the appointment.  The assessment could change in the future should new information be obtained.    GENETIC COUNSELING:  Cases of cancer follow three general patterns: sporadic, familial, and hereditary.  While most cancer is sporadic, some cases appear to occur in family clusters.  These cases are said to be familial and account for 10-20% of breast cancer cases.  Familial cases may be due to a combination of shared genes and environmental factors among family members.  In even fewer cases (5-10%), the risk for cancer is inherited, and the genes responsible for the increased cancer risk are known.      The pedigree patterns observed in sporadic versus hereditary cancer families were reviewed.  Family histories typical of hereditary cancer syndromes usually include multiple first- and second-degree relatives diagnosed with cancer types that define a syndrome.  These cases tend to be diagnosed at younger- than-expected ages and can be bilateral or multifocal.  The cancer in these families follows an autosomal dominant inheritance pattern, which indicates the likely presence of a mutation in a cancer susceptibility gene.  Children and siblings of an individual known to carry this mutation have a 50% chance of inheriting that mutation, thereby inheriting the increased risk to develop cancer.    Hereditary breast cancer accounts for 5-10% of all cases of breast and ovarian cancer.  A significant proportion of hereditary breast and ovarian cancer can be attributed to mutations in the BRCA1 and BRCA2 genes.  Mutations in these genes confer an increased risk for breast cancer, ovarian cancer, male breast cancer, prostate cancer, and pancreatic cancer.  Women with a BRCA1 or BRCA2 mutation have up to an 87% lifetime risk of breast cancer, and up to a 44% lifetime risk of ovarian cancer. There are other, more rare, hereditary cancer syndromes as  well as moderate risk cancer susceptibility genes. We discussed that the risks vary by gene, and management recommendations can vary by degree of risk.  Based on Ms. Saavedra’s family history and her desire to get more information regarding her personal risks, she opted to pursue testing through a panel evaluating several other genes known to increase the risk for cancer.    GENETIC TESTING:  The risks, benefits and limitations of genetic testing and implications for clinical management following testing were reviewed.  DNA test results can influence decisions regarding screening and prevention.  Genetic testing can have significant psychological implications for both individuals and families. Also discussed was the possibility of employment and insurance discrimination based on genetic test results and the laws in place to prevent this, as well as the limitations of these laws.      The benefits and limitations of genetic testing were discussed and Ms. Saavedra decided to pursue testing via the panel. The implications of a positive or negative test result were discussed.  We discussed the possibility that, in some cases, genetic test results may be informative or may be ambiguous due to the identification of a genetic variant. These variants may or may not be associated with an increased cancer risk.  With multigene panel testing, it is not uncommon for a variant of uncertain significance (VUS) to be identified.  If a VUS is identified, testing family members is typically not recommended and screening recommendations are made based on the family history.  The laboratories that perform genetic testing work to reclassify the VUS and send out an amended report if and when a VUS is reclassified.  The majority of variant findings are ultimately reclassified to a negative result. Given her family history, a negative test result does not eliminate all cancer risk, although the risk would not be as high as it would with positive  genetic testing.     PLAN: Genetic testing via the CancerNext panel was ordered. Blood draw will be coordinated at [facility]. Results from the high/moderate risk breast cancer genes are expected in 10 days, and results from the remainder of the panel are expected in 2-3 weeks. We will contact Ms. Saavedra with her results once they are received.      Racquel Ardon MS, [facility] [place]  Licensed Certified Genetic Counselor

## 2022-09-20 ENCOUNTER — HOSPITAL ENCOUNTER (OUTPATIENT)
Dept: MRI IMAGING | Facility: HOSPITAL | Age: 62
Discharge: HOME | End: 2022-09-20
Admitting: SURGERY

## 2022-09-20 DIAGNOSIS — N60.91 BENIGN MAMMARY DYSPLASIA OF RIGHT BREAST: ICD-10-CM

## 2022-09-20 LAB — CREAT BLDA-MCNC: 0.5 MG/DL (ref 0.6–1.3)

## 2022-09-20 PROCEDURE — 77049 MRI BREAST C-+ W/CAD BI: CPT | Performed by: RADIOLOGY

## 2022-09-20 PROCEDURE — A9577 INJ MULTIHANCE: HCPCS | Performed by: SURGERY

## 2022-09-20 PROCEDURE — 0 GADOBENATE DIMEGLUMINE 529 MG/ML SOLUTION: Performed by: SURGERY

## 2022-09-20 PROCEDURE — 82565 ASSAY OF CREATININE: CPT

## 2022-09-20 PROCEDURE — 77049 MRI BREAST C-+ W/CAD BI: CPT

## 2022-09-20 RX ADMIN — GADOBENATE DIMEGLUMINE 19 ML: 529 INJECTION, SOLUTION INTRAVENOUS at 13:53

## 2022-09-21 ENCOUNTER — TELEPHONE (OUTPATIENT)
Dept: MRI IMAGING | Facility: HOSPITAL | Age: 62
End: 2022-09-21

## 2022-09-21 NOTE — TELEPHONE ENCOUNTER
Called patient with MRI Breast results. Recommended 2nd look US scheduled for 10/11 at 10:00 at the [address]. Pt not on BT. Pt expressed understanding and was encouraged to call with any further questions or concerns.

## 2022-09-22 ENCOUNTER — TELEPHONE (OUTPATIENT)
Dept: GENETICS | Facility: HOSPITAL | Age: 62
End: 2022-09-22

## 2022-09-22 NOTE — TELEPHONE ENCOUNTER
Called the patient to setup appointment to come in for her blood draw for genetic testing. Left message on the voice mail.

## 2022-10-07 ENCOUNTER — TELEPHONE (OUTPATIENT)
Dept: GENETICS | Facility: HOSPITAL | Age: 62
End: 2022-10-07

## 2022-10-07 NOTE — TELEPHONE ENCOUNTER
Patient returned my call and opted to schedule her blood draw for genetics on Monday, October 17 in the afternoon.

## 2022-10-11 ENCOUNTER — HOSPITAL ENCOUNTER (OUTPATIENT)
Dept: ULTRASOUND IMAGING | Facility: HOSPITAL | Age: 62
Discharge: HOME | End: 2022-10-11

## 2022-10-11 DIAGNOSIS — N60.91 BENIGN MAMMARY DYSPLASIA OF RIGHT BREAST: ICD-10-CM

## 2022-10-11 DIAGNOSIS — R92.8 ABNORMAL MAGNETIC RESONANCE IMAGING OF BREAST: ICD-10-CM

## 2022-10-11 PROCEDURE — 0 LIDOCAINE 1 % SOLUTION: Performed by: RADIOLOGY

## 2022-10-11 PROCEDURE — 10005 FNA BX W/US GDN 1ST LES: CPT | Performed by: RADIOLOGY

## 2022-10-11 RX ORDER — LIDOCAINE HYDROCHLORIDE 10 MG/ML
5 INJECTION, SOLUTION INFILTRATION; PERINEURAL ONCE
Status: COMPLETED | OUTPATIENT
Start: 2022-10-11 | End: 2022-10-11

## 2022-10-11 RX ADMIN — Medication 1 ML: at 11:34

## 2022-10-14 ENCOUNTER — TELEPHONE (OUTPATIENT)
Dept: MRI IMAGING | Facility: HOSPITAL | Age: 62
End: 2022-10-14

## 2022-10-14 NOTE — TELEPHONE ENCOUNTER
Called patient with MRI Breast biopsy date of Oct. 20 to arrive at 8:00. Procedure explained in detail.Pt not on BT. Pt expressed understanding and was encouraged to call with any further questions or concerns.

## 2022-10-17 ENCOUNTER — LAB (OUTPATIENT)
Dept: LAB | Facility: HOSPITAL | Age: 62
End: 2022-10-17

## 2022-10-17 ENCOUNTER — CLINICAL SUPPORT (OUTPATIENT)
Dept: GENETICS | Facility: HOSPITAL | Age: 62
End: 2022-10-17

## 2022-10-17 DIAGNOSIS — Z80.3 FAMILY HISTORY OF BREAST CANCER: ICD-10-CM

## 2022-10-17 DIAGNOSIS — Z13.79 GENETIC TESTING: Primary | ICD-10-CM

## 2022-10-17 DIAGNOSIS — N60.99 ATYPICAL DUCTAL HYPERPLASIA OF BREAST: ICD-10-CM

## 2022-10-18 NOTE — PROGRESS NOTES
Ms. Saavedra was here today for a blood draw for her genetic testing.  A CancerNext panel was ordered.  Results are expected in 2-3 weeks.

## 2022-10-20 ENCOUNTER — HOSPITAL ENCOUNTER (OUTPATIENT)
Dept: MRI IMAGING | Facility: HOSPITAL | Age: 62
Discharge: HOME | End: 2022-10-20

## 2022-10-20 ENCOUNTER — HOSPITAL ENCOUNTER (OUTPATIENT)
Dept: MAMMOGRAPHY | Facility: HOSPITAL | Age: 62
Discharge: HOME | End: 2022-10-20

## 2022-10-20 DIAGNOSIS — N60.91 BENIGN MAMMARY DYSPLASIA OF RIGHT BREAST: ICD-10-CM

## 2022-10-20 PROCEDURE — 88305 TISSUE EXAM BY PATHOLOGIST: CPT | Performed by: RADIOLOGY

## 2022-10-20 PROCEDURE — 19085 BX BREAST 1ST LESION MR IMAG: CPT | Performed by: RADIOLOGY

## 2022-10-20 PROCEDURE — 77065 DX MAMMO INCL CAD UNI: CPT | Performed by: RADIOLOGY

## 2022-10-20 PROCEDURE — 0 LIDOCAINE 1 % SOLUTION: Performed by: RADIOLOGY

## 2022-10-20 PROCEDURE — A9577 INJ MULTIHANCE: HCPCS | Performed by: SURGERY

## 2022-10-20 PROCEDURE — A4648 IMPLANTABLE TISSUE MARKER: HCPCS

## 2022-10-20 PROCEDURE — 0 GADOBENATE DIMEGLUMINE 529 MG/ML SOLUTION: Performed by: SURGERY

## 2022-10-20 PROCEDURE — 19086 BX BREAST ADD LESION MR IMAG: CPT | Performed by: RADIOLOGY

## 2022-10-20 RX ORDER — LIDOCAINE HYDROCHLORIDE AND EPINEPHRINE 10; 10 MG/ML; UG/ML
10 INJECTION, SOLUTION INFILTRATION; PERINEURAL
Status: COMPLETED | OUTPATIENT
Start: 2022-10-20 | End: 2022-10-20

## 2022-10-20 RX ORDER — LIDOCAINE HYDROCHLORIDE AND EPINEPHRINE 10; 10 MG/ML; UG/ML
9 INJECTION, SOLUTION INFILTRATION; PERINEURAL
Status: COMPLETED | OUTPATIENT
Start: 2022-10-20 | End: 2022-10-20

## 2022-10-20 RX ORDER — LIDOCAINE HYDROCHLORIDE 10 MG/ML
5 INJECTION, SOLUTION INFILTRATION; PERINEURAL
Status: COMPLETED | OUTPATIENT
Start: 2022-10-20 | End: 2022-10-20

## 2022-10-20 RX ORDER — LIDOCAINE HYDROCHLORIDE 10 MG/ML
5 INJECTION, SOLUTION INFILTRATION; PERINEURAL
Status: DISCONTINUED | OUTPATIENT
Start: 2022-10-20 | End: 2022-10-20

## 2022-10-20 RX ORDER — LIDOCAINE HYDROCHLORIDE AND EPINEPHRINE 10; 10 MG/ML; UG/ML
10 INJECTION, SOLUTION INFILTRATION; PERINEURAL
Status: DISCONTINUED | OUTPATIENT
Start: 2022-10-20 | End: 2022-10-20

## 2022-10-20 RX ADMIN — Medication 5 ML: at 10:48

## 2022-10-20 RX ADMIN — GADOBENATE DIMEGLUMINE 19 ML: 529 INJECTION, SOLUTION INTRAVENOUS at 10:48

## 2022-10-20 RX ADMIN — Medication 5 ML: at 11:01

## 2022-10-20 RX ADMIN — LIDOCAINE HYDROCHLORIDE,EPINEPHRINE BITARTRATE 9 ML: 10; .01 INJECTION, SOLUTION INFILTRATION; PERINEURAL at 10:48

## 2022-10-20 RX ADMIN — LIDOCAINE HYDROCHLORIDE,EPINEPHRINE BITARTRATE 10 ML: 10; .01 INJECTION, SOLUTION INFILTRATION; PERINEURAL at 11:02

## 2022-10-21 LAB
CYTO UR: NORMAL
CYTO UR: NORMAL
LAB AP CASE REPORT: NORMAL
LAB AP CASE REPORT: NORMAL
LAB AP CLINICAL INFORMATION: NORMAL
LAB AP CLINICAL INFORMATION: NORMAL
LAB AP DIAGNOSIS COMMENT: NORMAL
LAB AP DIAGNOSIS COMMENT: NORMAL
PATH REPORT.FINAL DX SPEC: NORMAL
PATH REPORT.FINAL DX SPEC: NORMAL
PATH REPORT.GROSS SPEC: NORMAL
PATH REPORT.GROSS SPEC: NORMAL

## 2022-10-24 ENCOUNTER — TELEPHONE (OUTPATIENT)
Dept: MAMMOGRAPHY | Facility: HOSPITAL | Age: 62
End: 2022-10-24

## 2022-10-24 NOTE — TELEPHONE ENCOUNTER
Patient notified of pathology results and recommendations. Verbalizes understanding. Denies discomfort. Denies signs and symptoms of infection.     Patient notified of surgical consult appointment with Dr. Cummins on 10/31/22 @ 1100. Patient given office contact & location information. Told to bring photo ID, list of prescription & OTC medications, insurance information. Must wear a mask. Encouraged pt to call back or contact surgeon's office with further questions. Patient verbalized understanding.

## 2022-10-26 ENCOUNTER — TELEPHONE (OUTPATIENT)
Dept: GENETICS | Facility: HOSPITAL | Age: 62
End: 2022-10-26

## 2022-10-26 NOTE — TELEPHONE ENCOUNTER
At the request of the genetic counselor, I spoke with the patient to disclose partial  results from recent genetic testing for hereditary cancer.

## 2022-10-28 ENCOUNTER — TRANSCRIBE ORDERS (OUTPATIENT)
Dept: MAMMOGRAPHY | Facility: HOSPITAL | Age: 62
End: 2022-10-28

## 2022-10-28 ENCOUNTER — DOCUMENTATION (OUTPATIENT)
Dept: GENETICS | Facility: HOSPITAL | Age: 62
End: 2022-10-28

## 2022-10-28 DIAGNOSIS — N60.99 BENIGN MAMMARY DYSPLASIA, UNSPECIFIED LATERALITY: Primary | ICD-10-CM

## 2022-10-28 DIAGNOSIS — I10 BENIGN ESSENTIAL HYPERTENSION: ICD-10-CM

## 2022-10-28 RX ORDER — LOSARTAN POTASSIUM AND HYDROCHLOROTHIAZIDE 25; 100 MG/1; MG/1
1 TABLET ORAL DAILY
Qty: 90 TABLET | Refills: 0 | Status: SHIPPED | OUTPATIENT
Start: 2022-10-28 | End: 2023-01-31 | Stop reason: SDUPTHER

## 2022-10-28 RX ORDER — METFORMIN HYDROCHLORIDE 500 MG/1
500 TABLET, EXTENDED RELEASE ORAL
Qty: 90 TABLET | Refills: 0 | Status: SHIPPED | OUTPATIENT
Start: 2022-10-28 | End: 2023-01-31 | Stop reason: SDUPTHER

## 2022-10-28 NOTE — PROGRESS NOTES
Mirna Saavedra, a 62-year-old female, was referred for genetic counseling due to a personal history of atypical ductal hyperplasia (ADH) and a family history of breast cancer.  Genetic counseling was provided via telehealth.  Ms. Saavedra was 11 years old at menarche and is nulliparous.  She is postmenopausal and had a partial hysterectomy 15-16 years ago. She still retains her ovaries. Her last colonoscopy was around 10 years ago and she reports no history of polyps. She was recently identified to have ADH with suspicion for invasive carcinoma in the right breast at age 62. She is scheduled for a breast MRI and surgery is pending further work up. She was interested in discussing her risk to develop breast cancer and the likelihood for a hereditary cancer syndrome.  Ms. Saavedra opted to pursue comprehensive testing via the CancerNext panel ordered through [facility] which analyzes 36 genes that are associated with increased risk for cancer.  Genetic testing was negative for known deleterious/pathogenic mutations in the 36 on this panel. While no known deleterious mutations were identified, a variant of uncertain significance (VUS) was identified in the BRIP1 gene. VUSs are differences in DNA that may or may not affect the function of the gene.  VUSs are frequently reported through multigene panel testing, given the number of genes being evaluated and the presence of genetic variation in the population.  The majority (estimated to be >90%) of VUSs are eventually reclassified as benign gene variation. It is not recommended that any unaffected relatives be tested for a VUS since this is not a clinically actionable finding.  These results were discussed with Ms. Saavedra by telephone on 10/28/2022.    PERTINENT FAMILY HISTORY: (See attached pedigree)   Mother: Breast cancer, 47  Sister:  Breast cancer, 48  Pat Aunt: Possible breast cancer     We do not have medical records regarding any of these diagnoses.      RISK ASSESSMENT:   Ms. Saavedra’s family history of breast cancer let to concern regarding a hereditary risk factor. Ms. Saavedra meets NCCN guidelines criteria for BRCA1/2 testing based on having a close relative diagnosed with breast cancer at or under age 50.  We also discussed that if testing is negative, Ms. Saavedra’s breast cancer risk would still be impacted by the identified ADH.  If genetic testing comes back negative, risk modeling will be utilized to estimate her lifetime breast cancer risk based on her family history and personal history of atypical hyperplasia. This risk assessment is based on the family history information provided at the time of the appointment.  The assessment could change in the future should new information be obtained.    Because genetic testing was negative, her management should be guided by a family history-based risk assessment. Tyrer-HCH[employer] Cresseyzick, version 8 is able to take into account personal factors (age at first menarche, age at first birth, etc.) and family history when calculating risk for breast cancer. Computer modeling estimates that Ms. Saavedra’s lifetime personal risk for developing breast cancer is up to 24.5% (Tyrer-Cuzick, v8), compared to the general population risk of 12.5%.  A risk greater than 20% warrants consideration of increased screening for Ms. Saavedra per NCCN guidelines.  This risk assessment is based on the family history information provided at the time of the appointment and could change in the future should new information be obtained.    GENETIC COUNSELING:  Cases of cancer follow three general patterns: sporadic, familial, and hereditary.  While most cancer is sporadic, some cases appear to occur in family clusters.  These cases are said to be familial and account for 10-20% of breast cancer cases.  Familial cases may be due to a combination of shared genes and environmental factors among family members.  In even fewer cases (5-10%), the risk for cancer is inherited, and the genes  responsible for the increased cancer risk are known.      The pedigree patterns observed in sporadic versus hereditary cancer families were reviewed.  Family histories typical of hereditary cancer syndromes usually include multiple first- and second-degree relatives diagnosed with cancer types that define a syndrome.  These cases tend to be diagnosed at younger- than-expected ages and can be bilateral or multifocal.  The cancer in these families follows an autosomal dominant inheritance pattern, which indicates the likely presence of a mutation in a cancer susceptibility gene.  Children and siblings of an individual known to carry this mutation have a 50% chance of inheriting that mutation, thereby inheriting the increased risk to develop cancer.    Hereditary breast cancer accounts for 5-10% of all cases of breast and ovarian cancer.  A significant proportion of hereditary breast and ovarian cancer can be attributed to mutations in the BRCA1 and BRCA2 genes.  Mutations in these genes confer an increased risk for breast cancer, ovarian cancer, male breast cancer, prostate cancer, and pancreatic cancer.  Women with a BRCA1 or BRCA2 mutation have up to an 87% lifetime risk of breast cancer, and up to a 44% lifetime risk of ovarian cancer. There are other, more rare, hereditary cancer syndromes as well as moderate risk cancer susceptibility genes. We discussed that the risks vary by gene, and management recommendations can vary by degree of risk.  Based on Ms. Saavedra’s family history and her desire to get more information regarding her personal risks, she opted to pursue testing through a panel evaluating several other genes known to increase the risk for cancer.    GENETIC TESTING:  The risks, benefits and limitations of genetic testing and implications for clinical management following testing were reviewed.  DNA test results can influence decisions regarding screening and prevention.  Genetic testing can have  significant psychological implications for both individuals and families. Also discussed was the possibility of employment and insurance discrimination based on genetic test results and the laws in place to prevent this, as well as the limitations of these laws.      The benefits and limitations of genetic testing were discussed and Ms. Saavedra decided to pursue testing via the panel. The implications of a positive or negative test result were discussed.  We discussed the possibility that, in some cases, genetic test results may be informative or may be ambiguous due to the identification of a genetic variant. These variants may or may not be associated with an increased cancer risk.  With multigene panel testing, it is not uncommon for a variant of uncertain significance (VUS) to be identified.  If a VUS is identified, testing family members is typically not recommended and screening recommendations are made based on the family history.  The laboratories that perform genetic testing work to reclassify the VUS and send out an amended report if and when a VUS is reclassified.  The majority of variant findings are ultimately reclassified to a negative result. Given her family history, a negative test result does not eliminate all cancer risk, although the risk would not be as high as it would with positive genetic testing.     TEST RESULTS: Genetic testing was negative for known pathogenic mutations by sequencing rearrangement, and RNA analysis testing for the 36 genes on the CancerNext panel (see attached).  This result lowers the likelihood of a hereditary cancer syndrome in Ms. Saavedra. A variant of uncertain significance (VUS) was identified in the BRIP1 gene. VUSs are not clinically actionable findings, and therefore this result does not impact management in any way.  The majority of VUSs are ultimately reclassified to benign variation.  The identification of a VUS is common in multigene panel testing, given the number  of genes being evaluated and the presence of genetic variation in the population.  If this VUS is ever reclassified, a new report will be issued by the laboratory and released directly to the ordering physician, and our office. This assessment is based on the information provided at the time of the consultation.    CLINICAL MANAGEMENT GUIDELINES: Options available to individuals with an increased lifetime risk (greater than 20%) for breast cancer was discussed, including increased surveillance and chemoprevention.  Given her family history, Ms. Saavedra is at an increased lifetime risk for breast cancer, which warrants increased surveillance in the future.     Increased surveillance, based on NCCN guidelines, would consist of semi-annual clinical breast exam and annual mammography starting 10 years prior to the earliest breast cancer diagnosis in the family, or age 40, whichever is earliest.  According to an American Cancer Society expert panel and NCCN guidelines, annual breast MRI should be offered to women whose lifetime risk of breast cancer is 20-25 percent or more, typically beginning at the same age as mammography. Breast cancer chemoprevention is another option that can be considered in the future. Studies have shown that Tamoxifen and Raloxifene can cut the risk of estrogen receptor positive breast cancer by 50% when taken by high-risk women over a 5-year period.  There are risks and side effects associated with these medications; therefore, the risks versus benefits must be considered prior to deciding to take chemopreventative medications. These assessments are based on the information provided at the time of consultation and could change should new information become available.    PLAN: Genetic counseling remains available to Ms. Saavedra.  If she has any questions in the future, she is welcome to call me at [phone].      Racquel Ardon, MS, [facility] [place]  Licensed Certified Genetic Counselor      Cc: Mirna  Quentin Cummins MD

## 2022-10-28 NOTE — TELEPHONE ENCOUNTER
Caller: Mirna Saavedra    Relationship: Self    Best call back: [phone]    Requested Prescriptions:   Requested Prescriptions     Pending Prescriptions Disp Refills   • metFORMIN ER (GLUCOPHAGE-XR) 500 MG 24 hr tablet 90 tablet 0     Sig: Take 1 tablet by mouth Daily With Breakfast.   • losartan-hydrochlorothiazide (HYZAAR) 100-25 MG per tablet 90 tablet 0     Sig: Take 1 tablet by mouth Daily.        Pharmacy where request should be sent: [facility] [id number] - [address] [phone] [facility] [phone] FX     Additional details provided by patient: ALSO WILL LIKE TO MAKE SURE THAT HER BLOOD PRESSURE MEDICATION IS NOT THE ONE BEING RECALLED  Does the patient have less than a 3 day supply:  [x] Yes  [] No    Stephanie Kang Rep   10/28/22 12:15 EDT

## 2022-10-31 ENCOUNTER — TRANSCRIBE ORDERS (OUTPATIENT)
Dept: ADMINISTRATIVE | Facility: HOSPITAL | Age: 62
End: 2022-10-31

## 2022-10-31 DIAGNOSIS — N60.91 BENIGN MAMMARY DYSPLASIA OF RIGHT BREAST: Primary | ICD-10-CM

## 2022-11-22 ENCOUNTER — PRE-ADMISSION TESTING (OUTPATIENT)
Dept: PREADMISSION TESTING | Facility: HOSPITAL | Age: 62
End: 2022-11-22

## 2022-11-22 LAB
ANION GAP SERPL CALCULATED.3IONS-SCNC: 11 MMOL/L (ref 5–15)
BUN SERPL-MCNC: 8 MG/DL (ref 8–23)
BUN/CREAT SERPL: 17.8 (ref 7–25)
CALCIUM SPEC-SCNC: 9.3 MG/DL (ref 8.6–10.5)
CHLORIDE SERPL-SCNC: 96 MMOL/L (ref 98–107)
CO2 SERPL-SCNC: 24 MMOL/L (ref 22–29)
CREAT SERPL-MCNC: 0.45 MG/DL (ref 0.57–1)
EGFRCR SERPLBLD CKD-EPI 2021: 107.6 ML/MIN/1.73
GLUCOSE SERPL-MCNC: 177 MG/DL (ref 65–99)
POTASSIUM SERPL-SCNC: 3.3 MMOL/L (ref 3.5–5.2)
QT INTERVAL: 364 MS
QTC INTERVAL: 476 MS
SODIUM SERPL-SCNC: 131 MMOL/L (ref 136–145)

## 2022-11-22 PROCEDURE — 80048 BASIC METABOLIC PNL TOTAL CA: CPT

## 2022-11-22 PROCEDURE — 93005 ELECTROCARDIOGRAM TRACING: CPT

## 2022-11-22 PROCEDURE — 93010 ELECTROCARDIOGRAM REPORT: CPT | Performed by: INTERNAL MEDICINE

## 2022-11-22 PROCEDURE — 36415 COLL VENOUS BLD VENIPUNCTURE: CPT

## 2022-12-02 ENCOUNTER — HOSPITAL ENCOUNTER (OUTPATIENT)
Dept: NUCLEAR MEDICINE | Facility: HOSPITAL | Age: 62
Discharge: HOME | End: 2022-12-02

## 2022-12-02 ENCOUNTER — HOSPITAL ENCOUNTER (OUTPATIENT)
Dept: MAMMOGRAPHY | Facility: HOSPITAL | Age: 62
Discharge: HOME | End: 2022-12-02

## 2022-12-02 ENCOUNTER — LAB REQUISITION (OUTPATIENT)
Dept: LAB | Facility: HOSPITAL | Age: 62
End: 2022-12-02

## 2022-12-02 DIAGNOSIS — N60.91 BENIGN MAMMARY DYSPLASIA OF RIGHT BREAST: ICD-10-CM

## 2022-12-02 DIAGNOSIS — N60.99 BENIGN MAMMARY DYSPLASIA, UNSPECIFIED LATERALITY: ICD-10-CM

## 2022-12-02 DIAGNOSIS — N60.91 UNSPECIFIED BENIGN MAMMARY DYSPLASIA OF RIGHT BREAST: ICD-10-CM

## 2022-12-02 PROCEDURE — 77066 DX MAMMO INCL CAD BI: CPT | Performed by: RADIOLOGY

## 2022-12-02 PROCEDURE — 88342 IMHCHEM/IMCYTCHM 1ST ANTB: CPT

## 2022-12-02 PROCEDURE — 76098 X-RAY EXAM SURGICAL SPECIMEN: CPT | Performed by: RADIOLOGY

## 2022-12-02 PROCEDURE — 88307 TISSUE EXAM BY PATHOLOGIST: CPT

## 2022-12-02 PROCEDURE — 76098 X-RAY EXAM SURGICAL SPECIMEN: CPT

## 2022-12-02 PROCEDURE — 19284 PERQ DEV BREAST ADD STRTCTC: CPT | Performed by: RADIOLOGY

## 2022-12-02 PROCEDURE — 19283 PERQ DEV BREAST 1ST STRTCTC: CPT | Performed by: RADIOLOGY

## 2022-12-02 PROCEDURE — 88341 IMHCHEM/IMCYTCHM EA ADD ANTB: CPT

## 2022-12-02 PROCEDURE — 88360 TUMOR IMMUNOHISTOCHEM/MANUAL: CPT

## 2022-12-02 PROCEDURE — 0 TECHNETIUM FILTERED SULFUR COLLOID: Performed by: SURGERY

## 2022-12-02 PROCEDURE — C1819 TISSUE LOCALIZATION-EXCISION: HCPCS

## 2022-12-02 PROCEDURE — 38792 RA TRACER ID OF SENTINL NODE: CPT

## 2022-12-02 PROCEDURE — 0 LIDOCAINE 1 % SOLUTION: Performed by: RADIOLOGY

## 2022-12-02 PROCEDURE — A9541 TC99M SULFUR COLLOID: HCPCS | Performed by: SURGERY

## 2022-12-02 RX ORDER — LIDOCAINE HYDROCHLORIDE 10 MG/ML
5 INJECTION, SOLUTION INFILTRATION; PERINEURAL ONCE
Status: COMPLETED | OUTPATIENT
Start: 2022-12-02 | End: 2022-12-02

## 2022-12-02 RX ADMIN — Medication 4 ML: at 08:21

## 2022-12-02 RX ADMIN — Medication 5 ML: at 08:55

## 2022-12-02 RX ADMIN — TECHNETIUM TC 99M SULFUR COLLOID 1 DOSE: KIT at 11:55

## 2022-12-07 ENCOUNTER — HOSPITAL ENCOUNTER (OUTPATIENT)
Dept: RADIATION ONCOLOGY | Facility: HOSPITAL | Age: 62
Setting detail: RADIATION/ONCOLOGY SERIES
Discharge: HOME | End: 2022-12-07
Payer: MEDICAID

## 2022-12-07 ENCOUNTER — OFFICE VISIT (OUTPATIENT)
Dept: RADIATION ONCOLOGY | Facility: HOSPITAL | Age: 62
End: 2022-12-07

## 2022-12-07 VITALS
BODY MASS INDEX: 39.09 KG/M2 | RESPIRATION RATE: 18 BRPM | SYSTOLIC BLOOD PRESSURE: 151 MMHG | WEIGHT: 229 LBS | DIASTOLIC BLOOD PRESSURE: 69 MMHG | HEART RATE: 109 BPM | HEIGHT: 64 IN

## 2022-12-07 DIAGNOSIS — Z17.0 MALIGNANT NEOPLASM OF UPPER-OUTER QUADRANT OF RIGHT BREAST IN FEMALE, ESTROGEN RECEPTOR POSITIVE: Primary | ICD-10-CM

## 2022-12-07 DIAGNOSIS — C50.411 MALIGNANT NEOPLASM OF UPPER-OUTER QUADRANT OF RIGHT BREAST IN FEMALE, ESTROGEN RECEPTOR POSITIVE: Primary | ICD-10-CM

## 2022-12-07 RX ORDER — LANOLIN ALCOHOL/MO/W.PET/CERES
1000 CREAM (GRAM) TOPICAL DAILY
COMMUNITY

## 2022-12-07 RX ORDER — MULTIPLE VITAMINS W/ MINERALS TAB 9MG-400MCG
1 TAB ORAL DAILY
COMMUNITY

## 2022-12-07 NOTE — PROGRESS NOTES
CONSULTATION NOTE    NAME:      Mirna Saavedra  :                                                          1960  DATE OF CONSULTATION:                       22  REQUESTING PHYSICIAN:                   Myrna Cummins MD  REASON FOR CONSULTATION:           Pathologic T1 cN0 M0 stage I invasive ductal carcinoma of the right breast       BRIEF HISTORY:  Mirna Saavedra  is a very pleasant 62 y.o. female who developed left breast enlargement and intermittent left lower outer quadrant and axillary pain.  Mammogram was negative on the left but she had an architectural distortion in the right lateral breast, upper outer quadrant.  Pathology from the right breast biopsy was positive for at least ADH with 1 mm of focus suspicious for invasive carcinoma.  Breast MRI revealed postbiopsy changes in the right upper outer quadrant of the breast and no additional concerning masses on the right.  There were 3 indeterminate areas of contrast-enhancement in the left.  Left breast biopsies were negative.  2022 she underwent right right and left breast lumpectomies.    The left breast lumpectomy revealed ALH but no invasive or intraductal carcinoma.  The right breast lumpectomy revealed a a 1.4 cm invasive low-grade ductal carcinoma with lobular architecture.  Margins were free by 5 mm at least from invasive carcinoma and by 1 mm inferiorly medially of ductal carcinoma in situ.  Tumor was ER/IN positive HER2/sosa negative.  Extended inferior posterior and superior margins were negative.  0/3 right axillary nodes were positive for tumor.    Ms. Saavedra has a sister with ALS and a history of breast cancer.            Age at menarche:  11  Age at menopause:  Unkown, S/p hysterectomy  Hormone replacement therapy:  no  Personal history of breast cancer:  no  Family history of breast cancer:  yes; family member Mother and sister  Radiation to chest before age of 28:  no  Age of first live birth:  G0      BMI:  Body  mass index is 39.31 kg/m².      Social History     Substance and Sexual Activity   Alcohol Use Yes    Comment: daily glass of wine       Allergies   Allergen Reactions   • Contrast Dye Hives     CT CONTRAST   • Adhesive Tape Other (See Comments)     Pt states that when adhesive tape is removed, top layer of her skin is also removed.         Social History     Tobacco Use   • Smoking status: Former     Packs/day: 1.00     Years: 5.00     Pack years: 5.00     Types: Cigarettes     Quit date:      Years since quittin.9   • Smokeless tobacco: Never   Vaping Use   • Vaping Use: Never used   Substance Use Topics   • Alcohol use: Yes     Comment: daily glass of wine   • Drug use: Yes     Types: Marijuana     Comment: smoked for 5 years prior to quitting in          Past Medical History:   Diagnosis Date   • Anxiety    • Breast cancer (HCC)    • Diabetes mellitus (HCC)    • Elevated ferritin    • Elevated liver enzymes    • Fatigue    • GERD (gastroesophageal reflux disease)    • Hair loss    • Headache    • Hypertension    • Infectious viral hepatitis     A   • Low back pain    • Mitral valve prolapse    • Obesity    • Ovarian cyst    • Peptic ulceration    • Pneumonia        family history includes ALS in her sister; Alcohol abuse in her father; Breast cancer (age of onset: 47) in her mother; Breast cancer (age of onset: 48) in her sister; COPD in her father; Cancer in her mother; Early death in her mother; Miscarriages / Stillbirths in her mother.     Past Surgical History:   Procedure Laterality Date   • BREAST BIOPSY Right    • BREAST LUMPECTOMY Bilateral 2022    Left Breast is benign   • COLONOSCOPY     • EXPLORATORY LAPAROTOMY     • HAND SURGERY Left     torn ligament repair   • HYSTERECTOMY     • HYSTEROSCOPY     • HYSTEROSCOPY     • SKIN BIOPSY      multiple        Review of Systems   Constitutional: Positive for fatigue and unexpected weight  "change (15 pound weight gain in the last 3 months).   Neurological: Positive for numbness.        Chronic neuropathy in the last year   Psychiatric/Behavioral: Positive for sleep disturbance. The patient is nervous/anxious.         Patient sees a therapist   All other systems reviewed and are negative.          Objective   VITAL SIGNS:   Vitals:    12/07/22 1357   BP: 151/69   Pulse: 109   Resp: 18   Weight: 104 kg (229 lb)   Height: 162.6 cm (64\")   PainSc: 0-No pain        KPS       90%    Physical Exam  Vitals reviewed.   Constitutional:       Appearance: Normal appearance.   Cardiovascular:      Rate and Rhythm: Regular rhythm. Tachycardia present.   Pulmonary:      Effort: Pulmonary effort is normal.      Breath sounds: Normal breath sounds.   Neurological:      Mental Status: She is alert.     The breast exam reveals a scar in the right upper outer quadrant of the breast with ecchymosis and a well-healing surgical incision in the right axilla.  The left breast has a well-healing surgical incision adjacent to the nipple areolar complex in the upper inner quadrant with a associated ecchymosis.  She has no axillary adenopathy bilaterally.       The following portions of the patient's history were reviewed and updated as appropriate: allergies, current medications, past family history, past medical history, past social history, past surgical history and problem list.    Assessment      IMPRESSION:    Mirna Saavedra  is a 62 y.o. female who underwent right right and left breast lumpectomies.    The left breast lumpectomy revealed ALH but no invasive or intraductal carcinoma.  The right breast lumpectomy revealed a a 1.4 cm invasive low-grade ductal carcinoma with lobular architecture.  Margins were free by 5 mm at least from invasive carcinoma and by 1 mm inferiorly medially of ductal carcinoma in situ.  Tumor was ER/ME positive HER2/sosa negative.  Extended inferior posterior and superior margins were negative. "  0/3 right axillary nodes were positive for tumor.    I discussed the pathology with Luly Taylor from the lab.  I felt that in 1 area the breast were labeled incorrectly.  It is the right breast that had the tumor as above.  The left breast is negative          RECOMMENDATIONS: I recommend postoperative radiation to the right breast.  The pros and cons, risks and benefits of treatment were discussed.  An appointment was made for her to return for treatment simulation on 12/28/2022 at 2:00 PM. I will plan 40.05 Pimentel in 15 fractions or 45 Pimentel in 25 fractions with no tumor bed boost.  It is a pleasure to participate in the care of .                 Kayleigh Taveras MD    Total time of patient care on day of service including time prior to, face to face with patient, and following visit spent in reviewing records, lab results, imaging studies, discussion with patient, and documentation/charting was > 45 minutes.    Errors in dictation may reflect use of voice recognition software and not all errors in transcription may have been detected prior to signing.

## 2022-12-08 ENCOUNTER — TELEPHONE (OUTPATIENT)
Dept: ONCOLOGY | Facility: CLINIC | Age: 62
End: 2022-12-08

## 2022-12-08 NOTE — TELEPHONE ENCOUNTER
SW attempted to reach pt to follow up on Distress Screen from recent visit. PT was not available, therefore SW Left voicemail with contact information should needs arise. SW will be available ongoing.

## 2022-12-13 PROBLEM — Z17.0 MALIGNANT NEOPLASM OF UPPER-OUTER QUADRANT OF RIGHT BREAST IN FEMALE, ESTROGEN RECEPTOR POSITIVE: Status: ACTIVE | Noted: 2022-12-13

## 2022-12-13 PROBLEM — C50.411 MALIGNANT NEOPLASM OF UPPER-OUTER QUADRANT OF RIGHT BREAST IN FEMALE, ESTROGEN RECEPTOR POSITIVE (HCC): Status: ACTIVE | Noted: 2022-12-13

## 2022-12-28 ENCOUNTER — HOSPITAL ENCOUNTER (OUTPATIENT)
Dept: RADIATION ONCOLOGY | Facility: HOSPITAL | Age: 62
Discharge: HOME | End: 2022-12-28

## 2022-12-28 ENCOUNTER — OFFICE VISIT (OUTPATIENT)
Dept: RADIATION ONCOLOGY | Facility: HOSPITAL | Age: 62
End: 2022-12-28

## 2022-12-28 VITALS
HEART RATE: 106 BPM | RESPIRATION RATE: 20 BRPM | HEIGHT: 64 IN | BODY MASS INDEX: 39.37 KG/M2 | OXYGEN SATURATION: 98 % | SYSTOLIC BLOOD PRESSURE: 135 MMHG | DIASTOLIC BLOOD PRESSURE: 65 MMHG | TEMPERATURE: 97.3 F | WEIGHT: 230.6 LBS

## 2022-12-28 DIAGNOSIS — Z17.0 MALIGNANT NEOPLASM OF UPPER-OUTER QUADRANT OF RIGHT BREAST IN FEMALE, ESTROGEN RECEPTOR POSITIVE: Primary | ICD-10-CM

## 2022-12-28 DIAGNOSIS — C50.411 MALIGNANT NEOPLASM OF UPPER-OUTER QUADRANT OF RIGHT BREAST IN FEMALE, ESTROGEN RECEPTOR POSITIVE: Primary | ICD-10-CM

## 2022-12-28 PROCEDURE — 77334 RADIATION TREATMENT AID(S): CPT | Performed by: RADIOLOGY

## 2022-12-28 PROCEDURE — 77290 THER RAD SIMULAJ FIELD CPLX: CPT | Performed by: RADIOLOGY

## 2022-12-28 PROCEDURE — G0463 HOSPITAL OUTPT CLINIC VISIT: HCPCS | Performed by: RADIOLOGY

## 2022-12-28 PROCEDURE — 77333 RADIATION TREATMENT AID(S): CPT | Performed by: RADIOLOGY

## 2022-12-28 NOTE — PROGRESS NOTES
RE-EVALUATION    PATIENT:                                                      Mirna Saavedra  :                                                          1960  DATE:                          2022  DIAGNOSIS:        Pathologic T1 cN0 M0 stage I invasive ductal carcinoma of the right breast        Subjective      BRIEF HISTORY:  Mirna Saavedra  is a very pleasant 62 y.o. female who developed left breast enlargement and intermittent left lower outer quadrant and axillary pain.  Mammogram was negative on the left but she had an architectural distortion in the right lateral breast, upper outer quadrant.  Pathology from the right breast biopsy was positive for at least ADH with 1 mm of focus suspicious for invasive carcinoma.  Breast MRI revealed postbiopsy changes in the right upper outer quadrant of the breast and no additional concerning masses on the right.  There were 3 indeterminate areas of contrast-enhancement in the left.  Left breast biopsies were negative.  2022 she underwent right right and left breast lumpectomies.    The left breast lumpectomy revealed ALH but no invasive or intraductal carcinoma.  The right breast lumpectomy revealed a a 1.4 cm invasive low-grade ductal carcinoma with lobular architecture.  Margins were free by 5 mm at least from invasive carcinoma and by 1 mm inferiorly medially of ductal carcinoma in situ.  Tumor was ER/ME positive HER2/sosa negative.  Extended inferior posterior and superior margins were negative.  0/3 right axillary nodes were positive for tumor.       Allergies   Allergen Reactions   • Contrast Dye Hives     CT CONTRAST   • Adhesive Tape Other (See Comments)     Pt states that when adhesive tape is removed, top layer of her skin is also removed.         Review of Systems   Respiratory: Positive for cough.    Musculoskeletal:        Pain (burning sensation) in right axilla and right upper arm medially   Psychiatric/Behavioral: The patient is  "nervous/anxious.    All other systems reviewed and are negative.          Objective   VITAL SIGNS:   Vitals:    12/28/22 1347   BP: 135/65   Pulse: 106   Resp: 20   Temp: 97.3 °F (36.3 °C)   TempSrc: Temporal   SpO2: 98%   Weight: 105 kg (230 lb 9.6 oz)   Height: 162.6 cm (64\")   PainSc:   3   PainLoc: Arm  Comment: Under Right Arm        Karnofsky score: 90   {KPS:  **    Physical Exam  Vitals reviewed.   Constitutional:       Appearance: Normal appearance.   Cardiovascular:      Rate and Rhythm: Normal rate.   Pulmonary:      Effort: Pulmonary effort is normal.   Neurological:      Mental Status: She is alert.              The following portions of the patient's history were reviewed and updated as appropriate: allergies, current medications, past family history, past medical history, past social history, past surgical history and problem list.    Diagnoses and all orders for this visit:    Malignant neoplasm of upper-outer quadrant of right breast in female, estrogen receptor positive (HCC)  -     Ambulatory Referral to Occupational Therapy           IMPRESSION:    Mirna Saavedra  is a 62 y.o. female who underwent right right and left breast lumpectomies.  The left breast lumpectomy revealed ALH but no invasive or intraductal carcinoma.The right breast lumpectomy revealed a a 1.4 cm invasive low-grade ductal carcinoma with lobular architecture.  Margins were free by 5 mm at least from invasive carcinoma and by 1 mm inferiorly medially of ductal carcinoma in situ.  Tumor was ER/VA positive HER2/sosa negative.  Extended inferior posterior and superior margins were negative.  0/3 right axillary nodes were positive for tumor.    I spoke with a representative from Pathology who corrected that the right breast was positive for carcinoma.  The left breast was negative.        RECOMMENDATIONS:   I recommend postoperative radiation to the right breast.  The pros and cons, risks and benefits of treatment were discussed. "  We will do our treatment planning scan today and will plan 40.05 Pimentel in 15 fractions or 45 Pimentel in 25 fractions with no tumor bed boost.  It is a pleasure to participate in the care of .              Kayleigh Taveras MD    Total time of patient care on day of service including time prior to, face to face with patient, and following visit spent in reviewing records, lab results, imaging studies, discussion with patient, and documentation/charting was > 30 minutes.

## 2023-01-03 ENCOUNTER — HOSPITAL ENCOUNTER (OUTPATIENT)
Dept: RADIATION ONCOLOGY | Facility: HOSPITAL | Age: 63
Setting detail: RADIATION/ONCOLOGY SERIES
Discharge: HOME | End: 2023-01-03
Payer: MEDICAID

## 2023-01-04 ENCOUNTER — CONSULT (OUTPATIENT)
Dept: ONCOLOGY | Facility: CLINIC | Age: 63
End: 2023-01-04
Payer: MEDICAID

## 2023-01-04 VITALS
OXYGEN SATURATION: 97 % | WEIGHT: 228 LBS | BODY MASS INDEX: 38.93 KG/M2 | HEART RATE: 118 BPM | TEMPERATURE: 97.1 F | DIASTOLIC BLOOD PRESSURE: 67 MMHG | RESPIRATION RATE: 18 BRPM | HEIGHT: 64 IN | SYSTOLIC BLOOD PRESSURE: 150 MMHG

## 2023-01-04 DIAGNOSIS — Z17.0 MALIGNANT NEOPLASM OF UPPER-OUTER QUADRANT OF RIGHT BREAST IN FEMALE, ESTROGEN RECEPTOR POSITIVE: Primary | ICD-10-CM

## 2023-01-04 DIAGNOSIS — C50.411 MALIGNANT NEOPLASM OF UPPER-OUTER QUADRANT OF RIGHT BREAST IN FEMALE, ESTROGEN RECEPTOR POSITIVE: Primary | ICD-10-CM

## 2023-01-04 PROCEDURE — 77334 RADIATION TREATMENT AID(S): CPT | Performed by: RADIOLOGY

## 2023-01-04 PROCEDURE — 1160F RVW MEDS BY RX/DR IN RCRD: CPT | Performed by: INTERNAL MEDICINE

## 2023-01-04 PROCEDURE — 1126F AMNT PAIN NOTED NONE PRSNT: CPT | Performed by: INTERNAL MEDICINE

## 2023-01-04 PROCEDURE — 99205 OFFICE O/P NEW HI 60 MIN: CPT | Performed by: INTERNAL MEDICINE

## 2023-01-04 PROCEDURE — 3077F SYST BP >= 140 MM HG: CPT | Performed by: INTERNAL MEDICINE

## 2023-01-04 PROCEDURE — 3078F DIAST BP <80 MM HG: CPT | Performed by: INTERNAL MEDICINE

## 2023-01-04 PROCEDURE — 77295 3-D RADIOTHERAPY PLAN: CPT | Performed by: RADIOLOGY

## 2023-01-04 PROCEDURE — 1159F MED LIST DOCD IN RCRD: CPT | Performed by: INTERNAL MEDICINE

## 2023-01-04 PROCEDURE — 77300 RADIATION THERAPY DOSE PLAN: CPT | Performed by: RADIOLOGY

## 2023-01-04 NOTE — PROGRESS NOTES
Subjective     PROBLEM LIST:  1. [id number] ER positive (80%), AL+ (70%), HER2 negative (0+) invasive ductal carcinoma of the right breast  A) right breast lumpectomy on 12/2/2022 showed a low-grade invasive ductal carcinoma measuring 1.4 cm.  0 of 3 lymph nodes involved  2. DM  3. Anxiety  4. GERD  5. HTN      CHIEF COMPLAINT: breast cancer      HISTORY OF PRESENT ILLNESS:  The patient is a 62 y.o. female, referred for evaluation of a recently diagnosed breast cancer.    She had missed a mammogram during covid, and prior to getting her next one, she had noticed some changes in the left breast.  However when she went for mammogram a lesion was noted on the right.    She has had a lumpectomy and says she is recovered pretty well from it.  She had a lot of swelling in the axilla initially but this has improved.    She is retired.  She lives in [place] with her .        REVIEW OF SYSTEMS:  A 14 point review of systems was performed and is negative except as noted above.    Past Medical History:   Diagnosis Date   • Anxiety 2010   • Breast cancer (HCC)    • Diabetes mellitus (HCC)    • Elevated ferritin    • Elevated liver enzymes    • Fatigue    • GERD (gastroesophageal reflux disease) 1995   • Hair loss    • Headache 1970   • Hypertension    • Infectious viral hepatitis 1995    A   • Low back pain 1998   • Mitral valve prolapse    • Obesity 2008   • Ovarian cyst    • Peptic ulceration    • Pneumonia 1976       GYN History: Hysterectomy in her 40s        Objective     /67   Pulse 118   Temp 97.1 °F (36.2 °C)   Resp 18   Ht 162.6 cm (64\")   Wt 103 kg (228 lb)   SpO2 97%   BMI 39.14 kg/m²   Performance Status:  ECOG score: 0           General: well appearing female in no acute distress  Neuro: alert and oriented  HEENT: sclerae anicteric, oropharynx clear  Extremities: no lower extremity edema  Skin: no rashes, lesions, bruising, or petechiae  Psych: mood and affect appropriate    Lab Results    Component Value Date    WBC 6.16 05/24/2022    HGB 11.0 (L) 05/24/2022    HCT 35.4 05/24/2022    MCV 86.6 05/24/2022     05/24/2022     Lab Results   Component Value Date    GLUCOSE 177 (H) 11/22/2022    BUN 8 11/22/2022    CREATININE 0.45 (L) 11/22/2022    EGFRIFNONA 95 01/24/2022    BCR 17.8 11/22/2022    K 3.3 (L) 11/22/2022    CO2 24.0 11/22/2022    CALCIUM 9.3 11/22/2022    ALBUMIN 4.00 05/24/2022    AST 45 (H) 05/24/2022    ALT 28 05/24/2022                 ASSESSMENT AND PLAN:     Mirna Saavedra is a 62 y.o. female with stage IA ER+ Her2 negative IDC of the right breast.      We discussed the use of the Oncotype recurrence score.  This test provides information about the biology and risk of recurrence for ER positive Her2-negative breast cancers.  It is clear from previous studies that patients who have a low risk Oncotype do not benefit from adjuvant chemotherapy.  Conversely, patients with a high risk Oncotype can have a significant benefit from adjuvant chemotherapy.  Scores in the intermediate risk group may have a small benefit based on the patient's age.  We discussed that the Oncotype test is most useful if chemotherapy is an option that the patient is considering.  She would like to move forward with Oncotype testing    Regardless of the decision about chemotherapy, she is a candidate for adjuvant endocrine therapy with anastrozole.  We reviewed the potential side effects of anastrozole including hot flashes, vaginal dryness, joint pain, decrease in bone density, and mood or sleep disturbance.    F/u 3 weeks to review oncotype result.             A total greater than 60 mins minutes was spent in face to face patient time, examination, counseling, charting, reviewing test results, and reviewing outside records.    Codi Ferrera MD    1/4/2023

## 2023-01-04 NOTE — LETTER
January 4, 2023     Myrna Cummins MD  [address] [facility] [address]  [place] [id number]    Patient: Mirna Saavedra   YOB: 1960   Date of Visit: 1/4/2023       Dear Myrna Cummins MD    Mirna Saavedra was in my office today. Below is a copy of my note.    If you have questions, please do not hesitate to call me. I look forward to following Mirna along with you.         Sincerely,        Codi Ferrera MD        CC: Chiquita Acosta DO      Subjective      PROBLEM LIST:  1. [id number] ER positive (80%), OK+ (70%), HER2 negative (0+) invasive ductal carcinoma of the right breast  A) right breast lumpectomy on 12/2/2022 showed a low-grade invasive ductal carcinoma measuring 1.4 cm.  0 of 3 lymph nodes involved  2. DM  3. Anxiety  4. GERD  5. HTN      CHIEF COMPLAINT: breast cancer      HISTORY OF PRESENT ILLNESS:  The patient is a 62 y.o. female, referred for evaluation of a recently diagnosed breast cancer.    She had missed a mammogram during covid, and prior to getting her next one, she had noticed some changes in the left breast.  However when she went for mammogram a lesion was noted on the right.    She has had a lumpectomy and says she is recovered pretty well from it.  She had a lot of swelling in the axilla initially but this has improved.    She is retired.  She lives in [place] with her .        REVIEW OF SYSTEMS:  A 14 point review of systems was performed and is negative except as noted above.    Past Medical History:   Diagnosis Date   • Anxiety 2010   • Breast cancer (HCC)    • Diabetes mellitus (HCC)    • Elevated ferritin    • Elevated liver enzymes    • Fatigue    • GERD (gastroesophageal reflux disease) 1995   • Hair loss    • Headache 1970   • Hypertension    • Infectious viral hepatitis 1995    A   • Low back pain 1998   • Mitral valve prolapse    • Obesity 2008   • Ovarian cyst    • Peptic ulceration    • Pneumonia 1976       GYN History: Hysterectomy in  her 40s        Objective      /67   Pulse 118   Temp 97.1 °F (36.2 °C)   Resp 18   Ht 162.6 cm (64\")   Wt 103 kg (228 lb)   SpO2 97%   BMI 39.14 kg/m²   Performance Status:  ECOG score: 0           General: well appearing female in no acute distress  Neuro: alert and oriented  HEENT: sclerae anicteric, oropharynx clear  Extremities: no lower extremity edema  Skin: no rashes, lesions, bruising, or petechiae  Psych: mood and affect appropriate    Lab Results   Component Value Date    WBC 6.16 05/24/2022    HGB 11.0 (L) 05/24/2022    HCT 35.4 05/24/2022    MCV 86.6 05/24/2022     05/24/2022     Lab Results   Component Value Date    GLUCOSE 177 (H) 11/22/2022    BUN 8 11/22/2022    CREATININE 0.45 (L) 11/22/2022    EGFRIFNONA 95 01/24/2022    BCR 17.8 11/22/2022    K 3.3 (L) 11/22/2022    CO2 24.0 11/22/2022    CALCIUM 9.3 11/22/2022    ALBUMIN 4.00 05/24/2022    AST 45 (H) 05/24/2022    ALT 28 05/24/2022                 ASSESSMENT AND PLAN:     Mirna Saavedra is a 62 y.o. female with stage IA ER+ Her2 negative IDC of the right breast.      We discussed the use of the Oncotype recurrence score.  This test provides information about the biology and risk of recurrence for ER positive Her2-negative breast cancers.  It is clear from previous studies that patients who have a low risk Oncotype do not benefit from adjuvant chemotherapy.  Conversely, patients with a high risk Oncotype can have a significant benefit from adjuvant chemotherapy.  Scores in the intermediate risk group may have a small benefit based on the patient's age.  We discussed that the Oncotype test is most useful if chemotherapy is an option that the patient is considering.  She would like to move forward with Oncotype testing    Regardless of the decision about chemotherapy, she is a candidate for adjuvant endocrine therapy with anastrozole.  We reviewed the potential side effects of anastrozole including hot flashes, vaginal  dryness, joint pain, decrease in bone density, and mood or sleep disturbance.    F/u 3 weeks to review oncotype result.            A total greater than 60 mins minutes was spent in face to face patient time, examination, counseling, charting, reviewing test results, and reviewing outside records.    Codi Ferrera MD    1/4/2023

## 2023-01-09 ENCOUNTER — APPOINTMENT (OUTPATIENT)
Dept: MAMMOGRAPHY | Facility: HOSPITAL | Age: 63
End: 2023-01-09

## 2023-01-16 ENCOUNTER — PRIOR AUTHORIZATION (OUTPATIENT)
Dept: INTERNAL MEDICINE | Facility: CLINIC | Age: 63
End: 2023-01-16
Payer: MEDICAID

## 2023-01-17 ENCOUNTER — TELEPHONE (OUTPATIENT)
Dept: ONCOLOGY | Facility: CLINIC | Age: 63
End: 2023-01-17
Payer: MEDICAID

## 2023-01-17 DIAGNOSIS — Z17.0 MALIGNANT NEOPLASM OF UPPER-OUTER QUADRANT OF RIGHT BREAST IN FEMALE, ESTROGEN RECEPTOR POSITIVE: Primary | ICD-10-CM

## 2023-01-17 DIAGNOSIS — C50.411 MALIGNANT NEOPLASM OF UPPER-OUTER QUADRANT OF RIGHT BREAST IN FEMALE, ESTROGEN RECEPTOR POSITIVE: Primary | ICD-10-CM

## 2023-01-17 LAB — REF LAB TEST METHOD: NORMAL

## 2023-01-17 NOTE — PROGRESS NOTES
You have chosen to receive care through a telehealth visit.  Do you consent to use a video/audio connection for your medical care today? Yes        PROBLEM LIST:  1. [id number] ER positive (80%), ID+ (70%), HER2 negative (0+) invasive ductal carcinoma of the right breast  A) right breast lumpectomy on 12/2/2022 showed a low-grade invasive ductal carcinoma measuring 1.4 cm.  0 of 3 lymph nodes involved.  oncotype 18.  2. DM  3. Anxiety  4. GERD  5. HTN    Subjective     CHIEF COMPLAINT: breast cancer    HISTORY OF PRESENT ILLNESS:   Mirna Saavedra returns for follow-up.   She presents to review her oncotype results.        Objective      There were no vitals taken for this visit.     Performance Status:0              General: well appearing female in no acute distress  Neuro: alert and oriented  HEENT: sclera anicteric, oropharynx clear  Lymphatics: No masses by inspection  Lungs: Respiratory effort appears normal  Skin: no rashes, lesions, bruising, or petechiae  Psych: mood and affect appropriate        RECENT LABS:  Lab Results   Component Value Date    WBC 6.16 05/24/2022    HGB 11.0 (L) 05/24/2022    HCT 35.4 05/24/2022    MCV 86.6 05/24/2022     05/24/2022       Lab Results   Component Value Date    GLUCOSE 177 (H) 11/22/2022    BUN 8 11/22/2022    CREATININE 0.45 (L) 11/22/2022    EGFRIFNONA 95 01/24/2022    BCR 17.8 11/22/2022    K 3.3 (L) 11/22/2022    CO2 24.0 11/22/2022    CALCIUM 9.3 11/22/2022    ALBUMIN 4.00 05/24/2022    AST 45 (H) 05/24/2022    ALT 28 05/24/2022           I personally reviewed the imaging studies.        ASSESSMENT AND PLAN:     Mirna Saavedra is a 62 y.o. female  with stage IA ER+ Her2 negative IDC of the right breast.      Her oncotype score is 18, which is associated with no significant benefit with the use of IV chemotherapy.    She can go ahead and proceed with radiation treatment, which she was told would be a 5-week course.  After that she can start taking  anastrozole.  We reviewed the potential side effects of anastrozole including hot flashes, vaginal dryness, joint pain, decrease in bone density, and mood or sleep disturbance.    It has been years since she has had a bone density scan so I will order a new baseline test to be done sometime in the next few months.    Follow-up in about 3 months.  If she is doing well on the anastrozole then we will see her every 6 months after that.                        Codi Ferrera MD  [facility]    1/17/2023          CC:

## 2023-01-17 NOTE — TELEPHONE ENCOUNTER
Dr. Ferrera received oncotype test back early and asked me to notify patient and see if she wanted to come in earlier to discuss the results.  Her appt was originally on 1/26.  I called patient and asked if she could come tomorrow at 9am and she said she would do a mychart visit.  I told her we would change it on the schedule.

## 2023-01-18 ENCOUNTER — TELEMEDICINE (OUTPATIENT)
Dept: ONCOLOGY | Facility: CLINIC | Age: 63
End: 2023-01-18
Payer: MEDICAID

## 2023-01-18 DIAGNOSIS — Z17.0 MALIGNANT NEOPLASM OF UPPER-OUTER QUADRANT OF RIGHT BREAST IN FEMALE, ESTROGEN RECEPTOR POSITIVE: Primary | ICD-10-CM

## 2023-01-18 DIAGNOSIS — C50.411 MALIGNANT NEOPLASM OF UPPER-OUTER QUADRANT OF RIGHT BREAST IN FEMALE, ESTROGEN RECEPTOR POSITIVE: Primary | ICD-10-CM

## 2023-01-18 PROCEDURE — 99214 OFFICE O/P EST MOD 30 MIN: CPT | Performed by: INTERNAL MEDICINE

## 2023-01-18 RX ORDER — ANASTROZOLE 1 MG/1
1 TABLET ORAL DAILY
Qty: 90 TABLET | Refills: 3 | Status: SHIPPED | OUTPATIENT
Start: 2023-01-18

## 2023-01-19 DIAGNOSIS — K21.00 GASTROESOPHAGEAL REFLUX DISEASE WITH ESOPHAGITIS WITHOUT HEMORRHAGE: ICD-10-CM

## 2023-01-19 DIAGNOSIS — I10 BENIGN ESSENTIAL HYPERTENSION: ICD-10-CM

## 2023-01-19 RX ORDER — DEXLANSOPRAZOLE 60 MG/1
1 CAPSULE, DELAYED RELEASE ORAL DAILY
Qty: 90 CAPSULE | Refills: 0 | OUTPATIENT
Start: 2023-01-19

## 2023-01-19 RX ORDER — LOSARTAN POTASSIUM AND HYDROCHLOROTHIAZIDE 25; 100 MG/1; MG/1
1 TABLET ORAL DAILY
Qty: 90 TABLET | Refills: 0 | OUTPATIENT
Start: 2023-01-19

## 2023-01-19 RX ORDER — METFORMIN HYDROCHLORIDE 500 MG/1
500 TABLET, EXTENDED RELEASE ORAL
Qty: 90 TABLET | Refills: 0 | OUTPATIENT
Start: 2023-01-19

## 2023-01-19 NOTE — TELEPHONE ENCOUNTER
Caller: Mirna Saavedra    Relationship: Self    Best call back number: [phone]    Requested Prescriptions:   Requested Prescriptions     Pending Prescriptions Disp Refills   • metFORMIN ER (GLUCOPHAGE-XR) 500 MG 24 hr tablet 90 tablet 0     Sig: Take 1 tablet by mouth Daily With Breakfast.   • dexlansoprazole (Dexilant) 60 MG capsule 90 capsule 0     Sig: Take 1 capsule by mouth Daily.   • losartan-hydrochlorothiazide (HYZAAR) 100-25 MG per tablet 90 tablet 0     Sig: Take 1 tablet by mouth Daily.        Pharmacy where request should be sent: [facility] [id number] [place] [id number] [facility] [phone] [facility] [phone] FX         Does the patient have less than a 3 day supply:  [x] Yes  [] No    Would you like a call back once the refill request has been completed: [] Yes [x] No    If the office needs to give you a call back, can they leave a voicemail: [] Yes [x] No    Neo Davis, PCT   01/19/23 15:36 EST

## 2023-01-20 ENCOUNTER — HOSPITAL ENCOUNTER (OUTPATIENT)
Dept: RADIATION ONCOLOGY | Facility: HOSPITAL | Age: 63
Discharge: HOME | End: 2023-01-20

## 2023-01-20 PROCEDURE — 77280 THER RAD SIMULAJ FIELD SMPL: CPT | Performed by: RADIOLOGY

## 2023-01-23 ENCOUNTER — HOSPITAL ENCOUNTER (OUTPATIENT)
Dept: RADIATION ONCOLOGY | Facility: HOSPITAL | Age: 63
Setting detail: RADIATION/ONCOLOGY SERIES
Discharge: HOME | End: 2023-01-23
Payer: MEDICAID

## 2023-01-23 PROCEDURE — 77412 RADIATION TX DELIVERY LVL 3: CPT | Performed by: RADIOLOGY

## 2023-01-24 ENCOUNTER — HOSPITAL ENCOUNTER (OUTPATIENT)
Dept: RADIATION ONCOLOGY | Facility: HOSPITAL | Age: 63
Discharge: HOME | End: 2023-01-24

## 2023-01-24 VITALS — BODY MASS INDEX: 38.98 KG/M2 | WEIGHT: 227.1 LBS

## 2023-01-24 PROCEDURE — 77412 RADIATION TX DELIVERY LVL 3: CPT | Performed by: RADIOLOGY

## 2023-01-25 ENCOUNTER — HOSPITAL ENCOUNTER (OUTPATIENT)
Dept: RADIATION ONCOLOGY | Facility: HOSPITAL | Age: 63
Discharge: HOME | End: 2023-01-25

## 2023-01-25 PROCEDURE — 77412 RADIATION TX DELIVERY LVL 3: CPT | Performed by: RADIOLOGY

## 2023-01-26 ENCOUNTER — HOSPITAL ENCOUNTER (OUTPATIENT)
Dept: RADIATION ONCOLOGY | Facility: HOSPITAL | Age: 63
Discharge: HOME | End: 2023-01-26
Payer: MEDICAID

## 2023-01-26 PROCEDURE — 77412 RADIATION TX DELIVERY LVL 3: CPT | Performed by: RADIOLOGY

## 2023-01-26 PROCEDURE — 77336 RADIATION PHYSICS CONSULT: CPT | Performed by: RADIOLOGY

## 2023-01-27 ENCOUNTER — HOSPITAL ENCOUNTER (OUTPATIENT)
Dept: RADIATION ONCOLOGY | Facility: HOSPITAL | Age: 63
Discharge: HOME | End: 2023-01-27

## 2023-01-27 PROCEDURE — 77412 RADIATION TX DELIVERY LVL 3: CPT | Performed by: RADIOLOGY

## 2023-01-27 PROCEDURE — 77417 THER RADIOLOGY PORT IMAGE(S): CPT | Performed by: RADIOLOGY

## 2023-01-30 ENCOUNTER — HOSPITAL ENCOUNTER (OUTPATIENT)
Dept: RADIATION ONCOLOGY | Facility: HOSPITAL | Age: 63
Discharge: HOME | End: 2023-01-30

## 2023-01-30 PROCEDURE — 77412 RADIATION TX DELIVERY LVL 3: CPT | Performed by: RADIOLOGY

## 2023-01-31 ENCOUNTER — HOSPITAL ENCOUNTER (OUTPATIENT)
Dept: RADIATION ONCOLOGY | Facility: HOSPITAL | Age: 63
Discharge: HOME | End: 2023-01-31

## 2023-01-31 VITALS — BODY MASS INDEX: 39.31 KG/M2 | WEIGHT: 229 LBS

## 2023-01-31 DIAGNOSIS — I10 BENIGN ESSENTIAL HYPERTENSION: ICD-10-CM

## 2023-01-31 DIAGNOSIS — K21.00 GASTROESOPHAGEAL REFLUX DISEASE WITH ESOPHAGITIS WITHOUT HEMORRHAGE: ICD-10-CM

## 2023-01-31 PROCEDURE — 77412 RADIATION TX DELIVERY LVL 3: CPT | Performed by: RADIOLOGY

## 2023-01-31 RX ORDER — LOSARTAN POTASSIUM AND HYDROCHLOROTHIAZIDE 25; 100 MG/1; MG/1
1 TABLET ORAL DAILY
Qty: 30 TABLET | Refills: 0 | Status: SHIPPED | OUTPATIENT
Start: 2023-01-31 | End: 2023-02-13 | Stop reason: SDUPTHER

## 2023-01-31 RX ORDER — LOSARTAN POTASSIUM AND HYDROCHLOROTHIAZIDE 25; 100 MG/1; MG/1
1 TABLET ORAL DAILY
Qty: 90 TABLET | Refills: 0 | OUTPATIENT
Start: 2023-01-31

## 2023-01-31 RX ORDER — DEXLANSOPRAZOLE 60 MG/1
1 CAPSULE, DELAYED RELEASE ORAL DAILY
Qty: 30 CAPSULE | Refills: 0 | Status: SHIPPED | OUTPATIENT
Start: 2023-01-31 | End: 2023-03-01 | Stop reason: SDUPTHER

## 2023-01-31 RX ORDER — METFORMIN HYDROCHLORIDE 500 MG/1
500 TABLET, EXTENDED RELEASE ORAL
Qty: 30 TABLET | Refills: 0 | Status: SHIPPED | OUTPATIENT
Start: 2023-01-31 | End: 2023-02-13 | Stop reason: SDUPTHER

## 2023-01-31 RX ORDER — DEXLANSOPRAZOLE 60 MG/1
1 CAPSULE, DELAYED RELEASE ORAL DAILY
Qty: 90 CAPSULE | Refills: 0 | OUTPATIENT
Start: 2023-01-31

## 2023-01-31 RX ORDER — METFORMIN HYDROCHLORIDE 500 MG/1
500 TABLET, EXTENDED RELEASE ORAL
Qty: 90 TABLET | Refills: 0 | OUTPATIENT
Start: 2023-01-31

## 2023-01-31 NOTE — TELEPHONE ENCOUNTER
Spoke with patient and got her scheduled for a video visit on 2/13/2023 at 3:00. 30 day supply sent to pharmacy.

## 2023-01-31 NOTE — TELEPHONE ENCOUNTER
Caller: Mirna Saavedra    Relationship: Self    Best call back number:[phone]    Requested Prescriptions:   Requested Prescriptions     Pending Prescriptions Disp Refills   • losartan-hydrochlorothiazide (HYZAAR) 100-25 MG per tablet 90 tablet 0     Sig: Take 1 tablet by mouth Daily.   • metFORMIN ER (GLUCOPHAGE-XR) 500 MG 24 hr tablet 90 tablet 0     Sig: Take 1 tablet by mouth Daily With Breakfast.   • dexlansoprazole (Dexilant) 60 MG capsule 90 capsule 0     Sig: Take 1 capsule by mouth Daily.        Pharmacy where request should be sent: [facility] [id number] [facility] [phone] [facility] [phone] FX     Additional details provided by patient:     Does the patient have less than a 3 day supply:  [x] Yes  [] No    Would you like a call back once the refill request has been completed: [x] Yes [] No    If the office needs to give you a call back, can they leave a voicemail: [x] Yes [] No    Stephanie Steinberg Rep   01/31/23 12:44 EST

## 2023-01-31 NOTE — TELEPHONE ENCOUNTER
Patient is not scheduled for a follow up but she is currently doing radiation therapy and its currently making her extremely tired. She will follow up in March. Can she have refills until she can come in until then?

## 2023-02-01 ENCOUNTER — HOSPITAL ENCOUNTER (OUTPATIENT)
Dept: RADIATION ONCOLOGY | Facility: HOSPITAL | Age: 63
Discharge: HOME | End: 2023-02-01

## 2023-02-01 ENCOUNTER — HOSPITAL ENCOUNTER (OUTPATIENT)
Dept: RADIATION ONCOLOGY | Facility: HOSPITAL | Age: 63
Setting detail: RADIATION/ONCOLOGY SERIES
Discharge: HOME | End: 2023-02-01
Payer: MEDICAID

## 2023-02-01 ENCOUNTER — PRIOR AUTHORIZATION (OUTPATIENT)
Dept: INTERNAL MEDICINE | Facility: CLINIC | Age: 63
End: 2023-02-01
Payer: MEDICAID

## 2023-02-01 DIAGNOSIS — C50.411 MALIGNANT NEOPLASM OF UPPER-OUTER QUADRANT OF RIGHT BREAST IN FEMALE, ESTROGEN RECEPTOR POSITIVE: Primary | ICD-10-CM

## 2023-02-01 DIAGNOSIS — Z17.0 MALIGNANT NEOPLASM OF UPPER-OUTER QUADRANT OF RIGHT BREAST IN FEMALE, ESTROGEN RECEPTOR POSITIVE: Primary | ICD-10-CM

## 2023-02-01 PROCEDURE — 77412 RADIATION TX DELIVERY LVL 3: CPT | Performed by: RADIOLOGY

## 2023-02-02 ENCOUNTER — HOSPITAL ENCOUNTER (OUTPATIENT)
Dept: RADIATION ONCOLOGY | Facility: HOSPITAL | Age: 63
Discharge: HOME | End: 2023-02-02

## 2023-02-02 PROCEDURE — 77336 RADIATION PHYSICS CONSULT: CPT | Performed by: RADIOLOGY

## 2023-02-02 PROCEDURE — 77412 RADIATION TX DELIVERY LVL 3: CPT | Performed by: RADIOLOGY

## 2023-02-03 ENCOUNTER — HOSPITAL ENCOUNTER (OUTPATIENT)
Dept: RADIATION ONCOLOGY | Facility: HOSPITAL | Age: 63
Discharge: HOME | End: 2023-02-03

## 2023-02-03 PROCEDURE — 77417 THER RADIOLOGY PORT IMAGE(S): CPT | Performed by: RADIOLOGY

## 2023-02-03 PROCEDURE — 77412 RADIATION TX DELIVERY LVL 3: CPT | Performed by: RADIOLOGY

## 2023-02-06 ENCOUNTER — HOSPITAL ENCOUNTER (OUTPATIENT)
Dept: RADIATION ONCOLOGY | Facility: HOSPITAL | Age: 63
Discharge: HOME | End: 2023-02-06

## 2023-02-06 PROCEDURE — 77412 RADIATION TX DELIVERY LVL 3: CPT | Performed by: RADIOLOGY

## 2023-02-07 ENCOUNTER — HOSPITAL ENCOUNTER (OUTPATIENT)
Dept: RADIATION ONCOLOGY | Facility: HOSPITAL | Age: 63
Discharge: HOME | End: 2023-02-07

## 2023-02-07 ENCOUNTER — TELEPHONE (OUTPATIENT)
Dept: RADIATION ONCOLOGY | Facility: HOSPITAL | Age: 63
End: 2023-02-07
Payer: MEDICAID

## 2023-02-07 VITALS — BODY MASS INDEX: 39.43 KG/M2 | WEIGHT: 229.7 LBS

## 2023-02-07 PROCEDURE — 77412 RADIATION TX DELIVERY LVL 3: CPT | Performed by: RADIOLOGY

## 2023-02-07 NOTE — TELEPHONE ENCOUNTER
Called in Silvadene/Lidocaine ointment to patient's preferred Pharmacy for Grade 2 Dermatitis.  Instructions for use provided. Contact info provided.  Instructions for use were discussed with patient during Status Check in Clinic today.

## 2023-02-08 ENCOUNTER — HOSPITAL ENCOUNTER (OUTPATIENT)
Dept: RADIATION ONCOLOGY | Facility: HOSPITAL | Age: 63
Discharge: HOME | End: 2023-02-08

## 2023-02-08 PROCEDURE — 77412 RADIATION TX DELIVERY LVL 3: CPT | Performed by: RADIOLOGY

## 2023-02-09 ENCOUNTER — HOSPITAL ENCOUNTER (OUTPATIENT)
Dept: RADIATION ONCOLOGY | Facility: HOSPITAL | Age: 63
Discharge: HOME | End: 2023-02-09

## 2023-02-09 PROCEDURE — 77336 RADIATION PHYSICS CONSULT: CPT | Performed by: RADIOLOGY

## 2023-02-09 PROCEDURE — 77412 RADIATION TX DELIVERY LVL 3: CPT | Performed by: RADIOLOGY

## 2023-02-10 ENCOUNTER — HOSPITAL ENCOUNTER (OUTPATIENT)
Dept: RADIATION ONCOLOGY | Facility: HOSPITAL | Age: 63
Discharge: HOME | End: 2023-02-10

## 2023-02-10 PROCEDURE — 77412 RADIATION TX DELIVERY LVL 3: CPT | Performed by: RADIOLOGY

## 2023-02-10 PROCEDURE — 77417 THER RADIOLOGY PORT IMAGE(S): CPT | Performed by: RADIOLOGY

## 2023-02-13 ENCOUNTER — TELEMEDICINE (OUTPATIENT)
Dept: INTERNAL MEDICINE | Facility: CLINIC | Age: 63
End: 2023-02-13
Payer: MEDICAID

## 2023-02-13 ENCOUNTER — HOSPITAL ENCOUNTER (OUTPATIENT)
Dept: RADIATION ONCOLOGY | Facility: HOSPITAL | Age: 63
Discharge: HOME | End: 2023-02-13

## 2023-02-13 DIAGNOSIS — E11.9 CONTROLLED TYPE 2 DIABETES MELLITUS WITHOUT COMPLICATION, WITHOUT LONG-TERM CURRENT USE OF INSULIN: ICD-10-CM

## 2023-02-13 DIAGNOSIS — I10 HYPERTENSION, ESSENTIAL: Primary | ICD-10-CM

## 2023-02-13 DIAGNOSIS — E11.9 CONTROLLED TYPE 2 DIABETES MELLITUS WITHOUT COMPLICATION, WITHOUT LONG-TERM CURRENT USE OF INSULIN: Primary | ICD-10-CM

## 2023-02-13 DIAGNOSIS — I10 BENIGN ESSENTIAL HYPERTENSION: ICD-10-CM

## 2023-02-13 PROCEDURE — 77412 RADIATION TX DELIVERY LVL 3: CPT | Performed by: RADIOLOGY

## 2023-02-13 PROCEDURE — 99214 OFFICE O/P EST MOD 30 MIN: CPT | Performed by: INTERNAL MEDICINE

## 2023-02-13 RX ORDER — LOSARTAN POTASSIUM AND HYDROCHLOROTHIAZIDE 25; 100 MG/1; MG/1
1 TABLET ORAL DAILY
Qty: 90 TABLET | Refills: 1 | Status: SHIPPED | OUTPATIENT
Start: 2023-02-13

## 2023-02-13 RX ORDER — METFORMIN HYDROCHLORIDE 500 MG/1
500 TABLET, EXTENDED RELEASE ORAL
Qty: 90 TABLET | Refills: 1 | Status: SHIPPED | OUTPATIENT
Start: 2023-02-13

## 2023-02-13 NOTE — PROGRESS NOTES
Subjective   Mirna Saavedra is a 62 y.o. female.   No chief complaint on file.    Agrees to telehealth visit and is currently at her home in [place], [place]  History of Present Illness   Here for f/u on chronic conditions: HTN and DM. HTN controlled with Losartan HCTZ. DM controlled with metformin.   Currently undergoing radiation for breast cancer and has 2 more weeks.   The following portions of the patient's history were reviewed and updated as appropriate: allergies, current medications, past family history, past medical history, past social history, past surgical history and problem list.    Review of Systems   Respiratory: Negative for cough and shortness of breath.    Cardiovascular: Negative for chest pain and leg swelling.   Gastrointestinal: Negative for nausea and vomiting.   Neurological: Negative for weakness.   Psychiatric/Behavioral: Negative for confusion.     There were no vitals taken for this visit.    Objective   Physical Exam  Vitals reviewed.   Neurological:      Mental Status: She is alert and oriented to person, place, and time.   Psychiatric:         Behavior: Behavior normal.         Assessment & Plan   Diagnoses and all orders for this visit:    1. Controlled type 2 diabetes mellitus without complication, without long-term current use of insulin (HCC) (Primary)  -     metFORMIN ER (GLUCOPHAGE-XR) 500 MG 24 hr tablet; Take 1 tablet by mouth Daily With Breakfast.  Dispense: 90 tablet; Refill: 1  Continue Metformin  mg po qd  2. Benign essential hypertension  -     losartan-hydrochlorothiazide (HYZAAR) 100-25 MG per tablet; Take 1 tablet by mouth Daily.  Dispense: 90 tablet; Refill: 1  Continue Losartan HCTZ 100 25 mg po qd        This patient has consented to a telehealth visit via video. The visit was scheduled to comply with patient safety concerns in accordance with CDC recommendations.  I spent 25 minutes in total including but not limited to the 25 minutes spent in direct  conversation with this patient.

## 2023-02-14 ENCOUNTER — HOSPITAL ENCOUNTER (OUTPATIENT)
Dept: RADIATION ONCOLOGY | Facility: HOSPITAL | Age: 63
Discharge: HOME | End: 2023-02-14

## 2023-02-14 VITALS — BODY MASS INDEX: 39.58 KG/M2 | WEIGHT: 230.6 LBS

## 2023-02-14 PROCEDURE — 77412 RADIATION TX DELIVERY LVL 3: CPT | Performed by: RADIOLOGY

## 2023-02-15 ENCOUNTER — HOSPITAL ENCOUNTER (OUTPATIENT)
Dept: RADIATION ONCOLOGY | Facility: HOSPITAL | Age: 63
Discharge: HOME | End: 2023-02-15

## 2023-02-15 PROCEDURE — 77412 RADIATION TX DELIVERY LVL 3: CPT | Performed by: RADIOLOGY

## 2023-02-16 ENCOUNTER — TELEPHONE (OUTPATIENT)
Dept: INTERNAL MEDICINE | Facility: CLINIC | Age: 63
End: 2023-02-16
Payer: MEDICAID

## 2023-02-16 ENCOUNTER — HOSPITAL ENCOUNTER (OUTPATIENT)
Dept: RADIATION ONCOLOGY | Facility: HOSPITAL | Age: 63
Discharge: HOME | End: 2023-02-16

## 2023-02-16 PROCEDURE — 77336 RADIATION PHYSICS CONSULT: CPT | Performed by: RADIOLOGY

## 2023-02-16 PROCEDURE — 77412 RADIATION TX DELIVERY LVL 3: CPT | Performed by: RADIOLOGY

## 2023-02-17 ENCOUNTER — HOSPITAL ENCOUNTER (OUTPATIENT)
Dept: RADIATION ONCOLOGY | Facility: HOSPITAL | Age: 63
Discharge: HOME | End: 2023-02-17

## 2023-02-17 PROCEDURE — 77412 RADIATION TX DELIVERY LVL 3: CPT | Performed by: RADIOLOGY

## 2023-02-17 PROCEDURE — 77417 THER RADIOLOGY PORT IMAGE(S): CPT | Performed by: RADIOLOGY

## 2023-02-20 ENCOUNTER — HOSPITAL ENCOUNTER (OUTPATIENT)
Dept: RADIATION ONCOLOGY | Facility: HOSPITAL | Age: 63
Discharge: HOME | End: 2023-02-20

## 2023-02-20 PROCEDURE — 77412 RADIATION TX DELIVERY LVL 3: CPT | Performed by: RADIOLOGY

## 2023-02-21 ENCOUNTER — HOSPITAL ENCOUNTER (OUTPATIENT)
Dept: RADIATION ONCOLOGY | Facility: HOSPITAL | Age: 63
Discharge: HOME | End: 2023-02-21

## 2023-02-21 VITALS — WEIGHT: 231.6 LBS | BODY MASS INDEX: 39.75 KG/M2

## 2023-02-21 PROCEDURE — 77412 RADIATION TX DELIVERY LVL 3: CPT | Performed by: RADIOLOGY

## 2023-02-22 ENCOUNTER — HOSPITAL ENCOUNTER (OUTPATIENT)
Dept: RADIATION ONCOLOGY | Facility: HOSPITAL | Age: 63
Discharge: HOME | End: 2023-02-22

## 2023-02-22 PROCEDURE — 77412 RADIATION TX DELIVERY LVL 3: CPT | Performed by: RADIOLOGY

## 2023-02-23 ENCOUNTER — HOSPITAL ENCOUNTER (OUTPATIENT)
Dept: RADIATION ONCOLOGY | Facility: HOSPITAL | Age: 63
Discharge: HOME | End: 2023-02-23

## 2023-02-23 PROCEDURE — 77412 RADIATION TX DELIVERY LVL 3: CPT | Performed by: RADIOLOGY

## 2023-02-24 ENCOUNTER — HOSPITAL ENCOUNTER (OUTPATIENT)
Dept: RADIATION ONCOLOGY | Facility: HOSPITAL | Age: 63
Discharge: HOME | End: 2023-02-24

## 2023-02-24 PROCEDURE — 77336 RADIATION PHYSICS CONSULT: CPT | Performed by: RADIOLOGY

## 2023-02-24 PROCEDURE — 77412 RADIATION TX DELIVERY LVL 3: CPT | Performed by: RADIOLOGY

## 2023-03-01 DIAGNOSIS — K21.00 GASTROESOPHAGEAL REFLUX DISEASE WITH ESOPHAGITIS WITHOUT HEMORRHAGE: ICD-10-CM

## 2023-03-01 RX ORDER — DEXLANSOPRAZOLE 60 MG/1
1 CAPSULE, DELAYED RELEASE ORAL DAILY
Qty: 90 CAPSULE | Refills: 0 | Status: SHIPPED | OUTPATIENT
Start: 2023-03-01

## 2023-03-02 ENCOUNTER — APPOINTMENT (OUTPATIENT)
Dept: PHYSICAL THERAPY | Facility: HOSPITAL | Age: 63
End: 2023-03-02
Payer: MEDICAID

## 2023-03-15 ENCOUNTER — HOSPITAL ENCOUNTER (OUTPATIENT)
Dept: BONE DENSITY | Facility: HOSPITAL | Age: 63
Discharge: HOME | End: 2023-03-15
Payer: MEDICAID

## 2023-03-15 ENCOUNTER — LAB (OUTPATIENT)
Dept: LAB | Facility: HOSPITAL | Age: 63
End: 2023-03-15
Payer: MEDICAID

## 2023-03-15 DIAGNOSIS — E11.9 CONTROLLED TYPE 2 DIABETES MELLITUS WITHOUT COMPLICATION, WITHOUT LONG-TERM CURRENT USE OF INSULIN: ICD-10-CM

## 2023-03-15 DIAGNOSIS — I10 HYPERTENSION, ESSENTIAL: ICD-10-CM

## 2023-03-15 DIAGNOSIS — Z17.0 MALIGNANT NEOPLASM OF UPPER-OUTER QUADRANT OF RIGHT BREAST IN FEMALE, ESTROGEN RECEPTOR POSITIVE: ICD-10-CM

## 2023-03-15 DIAGNOSIS — D64.9 NORMOCYTIC ANEMIA: ICD-10-CM

## 2023-03-15 DIAGNOSIS — R74.8 ELEVATED LIVER ENZYMES: ICD-10-CM

## 2023-03-15 DIAGNOSIS — C50.411 MALIGNANT NEOPLASM OF UPPER-OUTER QUADRANT OF RIGHT BREAST IN FEMALE, ESTROGEN RECEPTOR POSITIVE: ICD-10-CM

## 2023-03-15 PROCEDURE — 77080 DXA BONE DENSITY AXIAL: CPT

## 2023-03-15 PROCEDURE — 85007 BL SMEAR W/DIFF WBC COUNT: CPT

## 2023-03-15 PROCEDURE — 85025 COMPLETE CBC W/AUTO DIFF WBC: CPT

## 2023-03-15 PROCEDURE — 83036 HEMOGLOBIN GLYCOSYLATED A1C: CPT

## 2023-03-15 PROCEDURE — 80053 COMPREHEN METABOLIC PANEL: CPT

## 2023-03-16 ENCOUNTER — HOSPITAL ENCOUNTER (OUTPATIENT)
Dept: PHYSICAL THERAPY | Facility: HOSPITAL | Age: 63
Setting detail: THERAPIES SERIES
Discharge: HOME | End: 2023-03-16
Payer: MEDICAID

## 2023-03-16 ENCOUNTER — TELEPHONE (OUTPATIENT)
Dept: INTERNAL MEDICINE | Facility: CLINIC | Age: 63
End: 2023-03-16
Payer: MEDICAID

## 2023-03-16 DIAGNOSIS — Z12.11 COLON CANCER SCREENING: ICD-10-CM

## 2023-03-16 DIAGNOSIS — C50.411 MALIGNANT NEOPLASM OF UPPER-OUTER QUADRANT OF RIGHT BREAST IN FEMALE, ESTROGEN RECEPTOR POSITIVE: Primary | ICD-10-CM

## 2023-03-16 DIAGNOSIS — Z17.0 MALIGNANT NEOPLASM OF UPPER-OUTER QUADRANT OF RIGHT BREAST IN FEMALE, ESTROGEN RECEPTOR POSITIVE: Primary | ICD-10-CM

## 2023-03-16 DIAGNOSIS — D50.8 OTHER IRON DEFICIENCY ANEMIA: Primary | ICD-10-CM

## 2023-03-16 DIAGNOSIS — M79.601 RIGHT ARM PAIN: ICD-10-CM

## 2023-03-16 LAB
ALBUMIN SERPL-MCNC: 4 G/DL (ref 3.5–5.2)
ALBUMIN/GLOB SERPL: 1.2 G/DL
ALP SERPL-CCNC: 104 U/L (ref 39–117)
ALT SERPL W P-5'-P-CCNC: 32 U/L (ref 1–33)
ANION GAP SERPL CALCULATED.3IONS-SCNC: 13 MMOL/L (ref 5–15)
ANISOCYTOSIS BLD QL: ABNORMAL
AST SERPL-CCNC: 61 U/L (ref 1–32)
BILIRUB SERPL-MCNC: 0.7 MG/DL (ref 0–1.2)
BUN SERPL-MCNC: 10 MG/DL (ref 8–23)
BUN/CREAT SERPL: 18.9 (ref 7–25)
CALCIUM SPEC-SCNC: 9.3 MG/DL (ref 8.6–10.5)
CHLORIDE SERPL-SCNC: 97 MMOL/L (ref 98–107)
CO2 SERPL-SCNC: 22 MMOL/L (ref 22–29)
CREAT SERPL-MCNC: 0.53 MG/DL (ref 0.57–1)
DEPRECATED RDW RBC AUTO: 48 FL (ref 37–54)
EGFRCR SERPLBLD CKD-EPI 2021: 103.4 ML/MIN/1.73
EOSINOPHIL # BLD MANUAL: 0.08 10*3/MM3 (ref 0–0.4)
EOSINOPHIL NFR BLD MANUAL: 2 % (ref 0.3–6.2)
ERYTHROCYTE [DISTWIDTH] IN BLOOD BY AUTOMATED COUNT: 18.9 % (ref 12.3–15.4)
GLOBULIN UR ELPH-MCNC: 3.3 GM/DL
GLUCOSE SERPL-MCNC: 176 MG/DL (ref 65–99)
HBA1C MFR BLD: 6.7 % (ref 4.8–5.6)
HCT VFR BLD AUTO: 31.7 % (ref 34–46.6)
HGB BLD-MCNC: 9.1 G/DL (ref 12–15.9)
LYMPHOCYTES # BLD MANUAL: 0.48 10*3/MM3 (ref 0.7–3.1)
LYMPHOCYTES NFR BLD MANUAL: 16.2 % (ref 5–12)
MCH RBC QN AUTO: 20.6 PG (ref 26.6–33)
MCHC RBC AUTO-ENTMCNC: 28.7 G/DL (ref 31.5–35.7)
MCV RBC AUTO: 71.9 FL (ref 79–97)
MICROCYTES BLD QL: ABNORMAL
MONOCYTES # BLD: 0.64 10*3/MM3 (ref 0.1–0.9)
NEUTROPHILS # BLD AUTO: 2.75 10*3/MM3 (ref 1.7–7)
NEUTROPHILS NFR BLD MANUAL: 69.7 % (ref 42.7–76)
PLAT MORPH BLD: NORMAL
PLATELET # BLD AUTO: 137 10*3/MM3 (ref 140–450)
PMV BLD AUTO: ABNORMAL FL
POIKILOCYTOSIS BLD QL SMEAR: ABNORMAL
POLYCHROMASIA BLD QL SMEAR: ABNORMAL
POTASSIUM SERPL-SCNC: 4 MMOL/L (ref 3.5–5.2)
PROT SERPL-MCNC: 7.3 G/DL (ref 6–8.5)
RBC # BLD AUTO: 4.41 10*6/MM3 (ref 3.77–5.28)
SODIUM SERPL-SCNC: 132 MMOL/L (ref 136–145)
VARIANT LYMPHS NFR BLD MANUAL: 1 % (ref 0–5)
VARIANT LYMPHS NFR BLD MANUAL: 11.1 % (ref 19.6–45.3)
WBC MORPH BLD: NORMAL
WBC NRBC COR # BLD: 3.95 10*3/MM3 (ref 3.4–10.8)

## 2023-03-16 PROCEDURE — 97161 PT EVAL LOW COMPLEX 20 MIN: CPT

## 2023-03-16 NOTE — TELEPHONE ENCOUNTER
----- Message from Chiquita Acosta DO sent at 3/16/2023  8:24 AM EDT -----  1. Diabetes is controlled  2. Mild increase in liver studies. Needs repeat level in 6 weeks.  3. Anemia present. Needs iron level and cbc in 1 week. Needs a colonoscopy to rule out source of anemia. Send back once she knows so I can place order.  4. Has she completed her radiation treatment?

## 2023-03-16 NOTE — TELEPHONE ENCOUNTER
Notified patient of results, patient verbalized understanding. She has finished radiation treatment.  She would like to proceed with colonoscopy. She will stop by the lab for repeat levels.

## 2023-03-17 NOTE — THERAPY EVALUATION
Physical Therapy  Initial Evaluation  [facility]     Patient Name: Mirna Saavedra  : 1960  MRN: [id number]  Today's Date: 3/17/2023      Visit Date: 2023    Visit Dx:    ICD-10-CM ICD-9-CM   1. Malignant neoplasm of upper-outer quadrant of right breast in female, estrogen receptor positive (HCC)  C50.411 174.4    Z17.0 V86.0   2. Right arm pain  M79.601 729.5       Patient Active Problem List   Diagnosis   • Acid reflux   • Benign essential hypertension   • Elevated liver enzymes   • Migraine   • Uterine leiomyoma   • EMILY positive   • Erythrocytosis   • High serum ferritin   • Controlled type 2 diabetes mellitus without complication, without long-term current use of insulin (HCC)   • Chronic neck pain   • Malignant neoplasm of upper-outer quadrant of right breast in female, estrogen receptor positive (HCC)        Past Medical History:   Diagnosis Date   • Anxiety    • Breast cancer (HCC)    • Diabetes mellitus (HCC)    • Elevated ferritin    • Elevated liver enzymes    • Fatigue    • GERD (gastroesophageal reflux disease)    • Hair loss    • Headache    • Hypertension    • Infectious viral hepatitis     A   • Low back pain    • Mitral valve prolapse    • Obesity    • Ovarian cyst    • Peptic ulceration    • Pneumonia         Past Surgical History:   Procedure Laterality Date   • BREAST BIOPSY Right    • BREAST LUMPECTOMY Bilateral 2022    Left Breast is benign   • COLONOSCOPY     • EXPLORATORY LAPAROTOMY     • HAND SURGERY Left     torn ligament repair   • HYSTERECTOMY     • HYSTEROSCOPY     • HYSTEROSCOPY     • SKIN BIOPSY      multiple       Visit Dx:    ICD-10-CM ICD-9-CM   1. Malignant neoplasm of upper-outer quadrant of right breast in female, estrogen receptor positive (HCC)  C50.411 174.4    Z17.0 V86.0   2. Right arm pain  M79.601 729.5        Patient History     Row Name 23 1500             History    Chief Complaint  Numbness;Tinglings;Pain  -LF      Type of Pain Upper Extremity / Arm;Shoulder pain  -LF      Date Current Problem(s) Began --  December 2022  -LF      Brief Description of Current Complaint Recent treatment for R BrCa.  Lumpectomy with SLNB on 12/2/2022, followed by radiation 1/23/2023 to 2/24/2023.  Onset N&T, sensitivitiy R upper arm and posterior shoulder following surgery.  Made worse when anything like clothing rubs against that part of her arm.   Also R shoulder pain.  Worse when bring arm back in front from reaching behind, and some with overhead movement.  Has had PT in the past for neck pain and limited ROM due to OA  -LF         Pain     Pain Location Arm;Shoulder  -LF      Pain at Present 0  -LF      Pain at Worst 8  shoulder, 5 arm  -LF      Pain Frequency Intermittent  -LF         Fall Risk Assessment    Any falls in the past year: Unspecified  -LF         Daily Activities    Primary Language English  -LF      Are you able to read Yes  -LF      Are you able to write Yes  -LF      How does patient learn best? Listening;Reading;Demonstration  -LF      Barriers to learning None  -LF      Pt Participated in POC and Goals Yes  -LF            User Key  (r) = Recorded By, (t) = Taken By, (c) = Cosigned By    Initials Name Provider Type    LF Donita Up, PT Physical Therapist                 Lymphedema     Row Name 03/16/23 1500             Lymphedema Assessment    Lymphedema Classification RUE:;at risk/stage 0  -LF      Chemo Received no  -LF      Radiation Therapy Received yes  -LF      Infections or Cellulitis? no  -LF         Posture/Observations    Posture/Observations Comments Tender R proximal biceps, AC joint, Upper trap  -LF         General ROM    GENERAL ROM COMMENTS moderate limited cervical ROM all directions.  Limited R>L end range flexion and abd ~10%.  HBH and HBH WNL's, mild pull on R  -LF         Lymphedema Edema Assessment    Edema Assessment Comment no concerns UE, axilla, lateral  trunk  -LF         Skin Changes/Observations    Skin Observations Comment UE WNL's  -LF         Lymphedema Sensation    Lymphedema Sensation Comments WNL's to light touch, but hypersensitivty with touch to R posterior upper arm and posterior shoulder  -LF         Lymphedema Measurements    Measurement Type(s) Quick Girth  -LF      Quick Girth Areas Upper extremities  -LF         Compression/Skin Care    Compression/Skin Care Comments discussed L-DEX BIS screening, but patient requested to defer today do to neuropathy in her feet  -LF            User Key  (r) = Recorded By, (t) = Taken By, (c) = Cosigned By    Initials Name Provider Type    LF Donita Up, PT Physical Therapist                   PT Ortho     Row Name 03/16/23 1500       Posture/Observations    Alignment Options Forward head;Cervical lordosis;Rounded shoulders  -LF    Forward Head Mild  -LF    Cervical Lordosis Decreased  -LF    Rounded Shoulders Mild  -LF       Special Tests/Palpation    Special Tests/Palpation Shoulder  -LF       Shoulder Impingement/Rotator Cuff Special Tests    Pitt-Alon Test (RC Lesion vs. Bursitis) Right:;Negative  -LF    Neer Impingement Test (RC Lesion vs. Bursitis) Right:;Positive  -LF    Empty Can Test (RC Lesion) Right:;Negative  -LF       MMT (Manual Muscle Testing)    General MMT Comments R shoulder abd and flex 4/5 w/ increased pain, 5/5 at B elbow, 4+/5 at B wrist.  L shoulder 4+/5  -LF       Sensation    Additional Comments --  -LF       RUE Quick Girth (cm)    Axilla 42 cm  -LF    Mid upper arm 40.8 cm  10cm. prox. elbow  -LF    Elbow 29 cm  -LF    Mid forearm 26.8 cm  10cm. distal elbow  -LF    Wrist crease 16.5 cm  -LF    Web space 20.5 cm  -LF    Met-heads 19.4 cm  -LF    RUE Quick Girth Total 195  -LF       LUE Quick Girth (cm)    Axilla 44.4 cm  -LF    Mid upper arm 43 cm  -LF    Elbow 30 cm  -LF    Mid forearm 26.5 cm  -LF    Wrist crease 16.2 cm  -LF    Web space 20.2 cm  -LF    Met-heads 19.1 cm   -LF    LUE Quick Girth Total 199.4  -LF          User Key  (r) = Recorded By, (t) = Taken By, (c) = Cosigned By    Initials Name Provider Type     Donita Up PT Physical Therapist                  Therapy Education  Education Details: Educated on desensitization HEP, ROM (seated HBH abd/add, sidebending, sidebending), wall slides  Given: HEP, Symptoms/condition management  Program: New  How Provided: Verbal, Demonstration, Written             PT OP Goals     Row Name 03/16/23 1500          PT Short Term Goals    STG Date to Achieve 04/27/23  -LF     STG 1 Patient to report 50% improvement R UE parasthesia and hypersensitivity  -LF     STG 1 Progress New  -     STG 2 Quick DASH improved by 10 points  -LF     STG 2 Progress New  -        Long Term Goals    LTG Date to Achieve 05/11/23  -LF     LTG 1 Patient independent with HEP for management of symptoms.  -LF     LTG 1 Progress New  -     LTG 2 Patient to report R shoulder pain </= 2/10 with reaching overhead and behind the back.  -LF     LTG 2 Progress New  -     LTG 3 Patient able to tolerate touch and clothing without onset of RUEpain and parasthesias  -LF     LTG 3 Progress New  -LF     LTG 4 R shoulder strength improved to 4+/5 and without increased pain.  -LF     LTG 4 Progress New  -        Time Calculation    PT Goal Re-Cert Due Date 06/14/23  -LF           User Key  (r) = Recorded By, (t) = Taken By, (c) = Cosigned By    Initials Name Provider Type     Donita Up, PT Physical Therapist                 PT Assessment/Plan     Row Name 03/16/23 1500          PT Assessment    Functional Limitations Performance in leisure activities;Performance in self-care ADL;Limitations in community activities  -     Impairments Pain;Muscle strength;Posture;Range of motion;Sensation;Impaired flexibility  -     Assessment Comments Ms. Saavedra presents with R UE pain, numbness, and tingling, alongth with R shoulder pain following treatment for R  breast cancer.  She impairments in posture, ROM, strength, and flexibility.  Further skilled care indicated to minimimize symptoms to allow return to [facility] for ADL's, leisure, and household activities.  Provided HEP for ROM, as well as desensitization this date.  -LF     Please refer to paper survey for additional self-reported information Yes  -LF     Rehab Potential Good  -LF     Patient/caregiver participated in establishment of treatment plan and goals Yes  -LF     Patient would benefit from skilled therapy intervention Yes  -LF        PT Plan    PT Frequency 1x/week  -LF     Planned CPT's? PT EVAL LOW COMPLEXITY: 68810;PT THER PROC EA 15 MIN: 90389;PT THER ACT EA 15 MIN: 96303;PT MANUAL THERAPY EA 15 MIN: 36394;PT SELF CARE/HOME MGMT/TRAIN EA 15: 94884  -LF     PT Plan Comments cont. per POC.  Progress HEP.  Obtain L-dex score  -LF           User Key  (r) = Recorded By, (t) = Taken By, (c) = Cosigned By    Initials Name Provider Type    LF Donita Up, PT Physical Therapist                   Outcome Measure Options: Quick DASH  Quick DASH  Open a tight or new jar.: Mild Difficulty  Do heavy household chores (e.g., wash walls, wash floors): Moderate Difficulty  Carry a shopping bag or briefcase: No Difficulty  Wash your back: Moderate Difficulty  Use a knife to cut food: No Difficulty  During the past week, to what extent has your arm, shoulder, or hand problem interfered with your normal social activites with family, friends, neighbors or groups?: Not at all  During the past week, were you limited in your work or other regular daily activities as a result of your arm, shoulder or hand problem?: Not limited at all  Arm, Shoulder, or hand pain: Moderate  Tingling (pins and needles) in your arm, shoulder, or hand: Moderate  During the past week, how much difficulty have you had sleeping because of the pain in your arm, shoulder or hand?: No difficulty  Number of Questions Answered: 10  Quick DASH Score:  22.5         Time Calculation:   Start Time: 1500  Untimed Charges  PT Eval/Re-eval Minutes: 65  Total Minutes  Untimed Charges Total Minutes: 65   Total Minutes: 65   Therapy Charges for Today     Code Description Service Date Service Provider Modifiers Qty    23728211265 HC PT EVAL LOW COMPLEXITY 4 3/16/2023 Donita Up, PT GP 1          PT G-Codes  Outcome Measure Options: Quick DASH  Quick DASH Score: 22.5         Donita Up, PT  3/17/2023

## 2023-03-21 ENCOUNTER — APPOINTMENT (OUTPATIENT)
Dept: PHYSICAL THERAPY | Facility: HOSPITAL | Age: 63
End: 2023-03-21
Payer: MEDICAID

## 2023-03-23 ENCOUNTER — TRANSCRIBE ORDERS (OUTPATIENT)
Dept: MAMMOGRAPHY | Facility: HOSPITAL | Age: 63
End: 2023-03-23
Payer: MEDICAID

## 2023-03-23 DIAGNOSIS — R92.8 ABNORMAL MAMMOGRAM: Primary | ICD-10-CM

## 2023-03-28 ENCOUNTER — HOSPITAL ENCOUNTER (OUTPATIENT)
Dept: PHYSICAL THERAPY | Facility: HOSPITAL | Age: 63
Setting detail: THERAPIES SERIES
Discharge: HOME | End: 2023-03-28
Payer: MEDICAID

## 2023-03-28 DIAGNOSIS — C50.411 MALIGNANT NEOPLASM OF UPPER-OUTER QUADRANT OF RIGHT BREAST IN FEMALE, ESTROGEN RECEPTOR POSITIVE: Primary | ICD-10-CM

## 2023-03-28 DIAGNOSIS — Z17.0 MALIGNANT NEOPLASM OF UPPER-OUTER QUADRANT OF RIGHT BREAST IN FEMALE, ESTROGEN RECEPTOR POSITIVE: Primary | ICD-10-CM

## 2023-03-28 DIAGNOSIS — M79.601 RIGHT ARM PAIN: ICD-10-CM

## 2023-03-28 PROCEDURE — 97140 MANUAL THERAPY 1/> REGIONS: CPT

## 2023-03-28 NOTE — THERAPY TREATMENT NOTE
Outpatient Physical Therapy  Treatment Note   [facility]     Patient Name: Mirna Saavdera  : 1960  MRN: [id number]  Today's Date: 3/28/2023        Visit Date: 2023    Visit Dx:    ICD-10-CM ICD-9-CM   1. Malignant neoplasm of upper-outer quadrant of right breast in female, estrogen receptor positive (HCC)  C50.411 174.4    Z17.0 V86.0   2. Right arm pain  M79.601 729.5       Patient Active Problem List   Diagnosis   • Acid reflux   • Benign essential hypertension   • Elevated liver enzymes   • Migraine   • Uterine leiomyoma   • EMILY positive   • Erythrocytosis   • High serum ferritin   • Controlled type 2 diabetes mellitus without complication, without long-term current use of insulin (HCC)   • Chronic neck pain   • Malignant neoplasm of upper-outer quadrant of right breast in female, estrogen receptor positive (HCC)         Lymphedema     Row Name 23 1310             Subjective Pain    Able to rate subjective pain? yes  -LF      Pre-Treatment Pain Level 4  -LF      Post-Treatment Pain Level 3  -LF         Subjective Comments    Subjective Comments Reports compliance with HEP and some improvement in symptoms.  -LF         Skin Changes/Observations    Skin Observations Comment UE WNL's  -LF         Manual Lymphatic Drainage    Manual Lymphatic Drainage initial sequence;opened regional lymph nodes;extremity treatment  -LF      Initial Sequence supraclavicular;shoulder collectors;diaphragmatic breathing  -LF      Supraclavicular right;left  -LF      Shoulder Collectors right;left  -LF      Diaphragmatic Breathing completed x5  -LF      Opened Regional Lymph Nodes axillary  -LF      Axillary right  -LF      Extremity Treatment MLD to proximal limb only  -LF      MLD to Proximal Limb Only RUE  -LF      Manual Lymphatic Drainage Comments MLD R upper and lateral chest well working towards R axilla  -LF      Manual Therapy Elastic taping:  applied single strip tape anchored proximal elbow and 25  stretch applied along tricep to posterior axillary border (this is area with most sensitivity).  Prior to taping, stroking with textured towel to same area with good tolerance  -LF            User Key  (r) = Recorded By, (t) = Taken By, (c) = Cosigned By    Initials Name Provider Type    Donita Mcdonald PT Physical Therapist                     PT Assessment/Plan     Row Name 03/28/23 1310          PT Assessment    Assessment Comments Decreased sensitivity R upper arm.  Limited ROM ther ex due to some shoulder joint discomfort.  -LF        PT Plan    PT Plan Comments cont. per POC  -LF           User Key  (r) = Recorded By, (t) = Taken By, (c) = Cosigned By    Initials Name Provider Type     Donita Up PT Physical Therapist                    OP Exercises     Row Name 03/28/23 1310             Subjective Comments    Subjective Comments Reports compliance with HEP and some improvement in symptoms.  -LF         Subjective Pain    Able to rate subjective pain? yes  -LF      Pre-Treatment Pain Level 4  -LF      Post-Treatment Pain Level 3  -LF         Total Minutes    46689 - PT Therapeutic Exercise Minutes 8  -LF      12129 - PT Manual Therapy Minutes 40  -LF         Exercise 1    Exercise Name 1 HBH open/close, hands clasped overhed flexion, single-arm doorway stretch for RUE  -LF            User Key  (r) = Recorded By, (t) = Taken By, (c) = Cosigned By    Initials Name Provider Type     Donita Up PT Physical Therapist                  Manual Rx (last 36 hours)     Manual Treatments     Row Name 03/28/23 1310             Total Minutes    35887 - PT Manual Therapy Minutes 40  -LF            User Key  (r) = Recorded By, (t) = Taken By, (c) = Cosigned By    Initials Name Provider Type    Donita Mcdonald PT Physical Therapist                    Therapy Education  Education Details: Writtent program for MLD sequence.  Also instructed to incorporated desensitization to R breast.  Added  supine and doorway stretch  Given: HEP, Symptoms/condition management  Program: Reinforced, Progressed  How Provided: Verbal, Demonstration, Written  Provided to: Patient  Level of Understanding: Verbalized, Demonstrated       Time Calculation:   Start Time: 1310  Timed Charges  07562 - PT Therapeutic Exercise Minutes: 8  47056 - PT Manual Therapy Minutes: 40  Total Minutes  Timed Charges Total Minutes: 48   Total Minutes: 48   Therapy Charges for Today     Code Description Service Date Service Provider Modifiers Qty    31709983611 HC PT MANUAL THERAPY EA 15 MIN 3/28/2023 Donita Up, PT GP 3                    Donita Up, PT  3/28/2023

## 2023-03-31 ENCOUNTER — HOSPITAL ENCOUNTER (OUTPATIENT)
Dept: RADIATION ONCOLOGY | Facility: HOSPITAL | Age: 63
Setting detail: RADIATION/ONCOLOGY SERIES
Discharge: HOME | End: 2023-03-31
Payer: MEDICAID

## 2023-03-31 ENCOUNTER — OFFICE VISIT (OUTPATIENT)
Dept: RADIATION ONCOLOGY | Facility: HOSPITAL | Age: 63
End: 2023-03-31
Payer: MEDICAID

## 2023-03-31 DIAGNOSIS — Z17.0 MALIGNANT NEOPLASM OF UPPER-OUTER QUADRANT OF RIGHT BREAST IN FEMALE, ESTROGEN RECEPTOR POSITIVE: Primary | ICD-10-CM

## 2023-03-31 DIAGNOSIS — C50.411 MALIGNANT NEOPLASM OF UPPER-OUTER QUADRANT OF RIGHT BREAST IN FEMALE, ESTROGEN RECEPTOR POSITIVE: Primary | ICD-10-CM

## 2023-03-31 NOTE — PROGRESS NOTES
FOLLOW UP NOTE    PATIENT:                                                      Mirna Saavedra  MEDICAL RECORD #:                        [id number]  :                                                          1960  COMPLETION DATE:   2023  DIAGNOSIS:     Malignant neoplasm of upper-outer quadrant of right breast in female, estrogen receptor positive (HCC)  - Stage IA (pT1c, pN0, cM0, G1, ER+, SC+, HER2-)      This visit has been converted to a telehealth virtual visit, the patient's preferred method for today's follow-up, as she is quarantining at home for possible positive COVID-19 exposure.  Total time of discussion was 18 minutes.  The patient has given verbal consent.    BRIEF HISTORY:  Mirna Saavedra is a 62 y.o female presenting via telemedicine for initial follow-up visit of her right-sided breast cancer.  She initially presented with complaints of left breast enlargement and intermittent left lower outer quadrant and axillary pain.  Mammogram was negative on the left, but she have an architectural distortion in the upper outer quadrant of the right breast.  Pathology from the right breast biopsy was positive for at least ADH with 1 mm of focus suspicious for invasive carcinoma.  Breast MRI revealed postbiopsy changes in the right upper outer quadrant of the breast and no additional concerning masses on the right.  There were 3 indeterminate areas of contrast-enhancement in the left.  Left breast biopsies were negative.  On 2022 she underwent right and left breast lumpectomies with Dr. CHRISTIANSON.    The left breast lumpectomy revealed ALH but no invasive or intraductal carcinoma.  The right breast lumpectomy revealed a 1.4 cm invasive low-grade ductal carcinoma with lobular architecture.  Tumor was ER/SC positive HER2/sosa negative.  Margins were free by at least 5 mm from invasive carcinoma and by 1 mm inferiorly medially of ductal carcinoma in situ.  Extended inferior, posterior, and superior  margins were negative.  0/3 right axillary nodes were positive for tumor.  She had Oncotype score of 18, and adjuvant chemotherapy was not recommended.    She underwent adjuvant radiation therapy to the right breast consisting of 45 Gray in 25 fractions, completing 2/24/2023.  She tolerated treatment well.  Treatment related fatigue continues to slowly subside.  She did develop moist desquamation within the right inframammary fold and lateral breast/low axilla.  This was managed with application of Silvadene/lidocaine topically.  She now reports skin is well-healed, no longer raw or peeling, and no longer requiring application of skin creams or ointment.  She reports occasional right breast pain.  She did experience minimal edema of the breast, as well as some ongoing postsurgical neuropathic pain of the right upper extremity.  She reports improvement of swelling and pain since working with physical therapy.  She has begun adjuvant anastrozole, which she is tolerating without reported side effects.  She denies new or progressive joint pains.  She denies dyspnea or chest pain.  Overall, she feels fairly well.  Of note, she has asked for conversion of visit to telemedicine today as she is quarantining for possible positive COVID-19 exposure.        MEDICATIONS: Medication reconciliation for the patient was reviewed and confirmed in the electronic medical record.    Review of Systems   Constitutional: Positive for fatigue.   Neurological: Positive for numbness (Right axilla post-op).   All other systems reviewed and are negative.          KPS 90%      Physical Exam  Pulmonary:      Respirations even, unlabored. No audible wheezing or cough.  Neurological:      A+Ox4, conversant, answers questions appropriately.  Psychiatric:     Judgement, affect, and decision-making WNL.    Limited physical exam as visit was conducted remotely via telephone.        The following portions of the patient's history were reviewed and  updated as appropriate: allergies, current medications, past family history, past medical history, past social history, past surgical history and problem list.         Diagnoses and all orders for this visit:    1. Malignant neoplasm of upper-outer quadrant of right breast in female, estrogen receptor positive (HCC) (Primary)         IMPRESSION:   Mirna Saavedra is a 62 y.o. female with recent diagnosis of a stage IA (pT1c, pN0, cM0) grade 1 ER/NC positive, HER2/sosa negative IDC of the right breast.  Following right breast lumpectomy, she underwent adjuvant radiotherapy on the right as part of her breast conserving treatment.  She tolerated treatment well.  She developed the anticipated grade 1 fatigue and grade 2 radiation dermatitis which have reportedly subsided at this point.  We discussed the role of local breast massage, as well as stretching and range of motion exercises of the right upper extremity to minimize the risk of treatment related fibrosis or lymphedema.  She continues to be seen in the PT lymphedema clinic.  The patient is now on adjuvant endocrine therapy with anastrozole, which she is tolerating.  She is scheduled for repeat diagnostic bilateral mammogram 6 months out from radiation, on 8/24/2023.  We discussed follow-up intervals, including biannual mammograms to alternate between the right and bilateral breasts, biannual clinical breast exams, and monthly self breast exams.    RECOMMENDATIONS:  Mirna Saavedra continues routine oncologic surveillance under the care of Dr. Ferrera, with follow-up in the survivorship clinic on 4/18/2023.  Additionally, she continues to see Dr. CHRISTIANSON in the surgical oncology breast clinic.  Given the close follow-up with her other physicians, we will be happy to remain available as needed.    Return if symptoms worsen or fail to improve, for Office Visit.    GREG Briggs spent a total of 35 minutes on today's visit, with more than 18 minutes in virtual  communication with the patient via telephone, and the remainder of the time spent in reviewing the relevant history, records, available imaging, and for documentation.

## 2023-04-07 ENCOUNTER — TELEMEDICINE (OUTPATIENT)
Dept: SLEEP MEDICINE | Facility: HOSPITAL | Age: 63
End: 2023-04-07
Payer: MEDICAID

## 2023-04-07 VITALS — WEIGHT: 230 LBS | HEIGHT: 64 IN | BODY MASS INDEX: 39.27 KG/M2

## 2023-04-07 DIAGNOSIS — G47.33 OBSTRUCTIVE SLEEP APNEA, ADULT: Primary | ICD-10-CM

## 2023-04-07 PROCEDURE — 1160F RVW MEDS BY RX/DR IN RCRD: CPT | Performed by: NURSE PRACTITIONER

## 2023-04-07 PROCEDURE — 1159F MED LIST DOCD IN RCRD: CPT | Performed by: NURSE PRACTITIONER

## 2023-04-07 PROCEDURE — 99213 OFFICE O/P EST LOW 20 MIN: CPT | Performed by: NURSE PRACTITIONER

## 2023-04-07 NOTE — PROGRESS NOTES
"Chief Complaint:   Chief Complaint   Patient presents with   • Follow-up       HPI:    Mirna Saavedra is a 62 y.o. female here for follow-up of sleep apnea.  Patient was last seen 4/8/2022.  Patient continues to do well with CPAP therapy and states \"I do not lay down without it.\"  She is sleeping 7 to 8 hours nightly and usually goes to sleep fairly quickly.  Patient does get up 1-2 times in the night.  Patient states she is a little more tired at this time as she has just recently finished radiation for newly diagnosed breast cancer.  She does still put [place] score of 4/24.  She has no concerns or complaints and wishes to continue CPAP therapy.        Current medications are:   Current Outpatient Medications:   •  ALPRAZolam (XANAX) 0.5 MG tablet, Take 1 tablet by mouth As Needed for Anxiety., Disp: 30 tablet, Rfl: 2  •  anastrozole (ARIMIDEX) 1 MG tablet, Take 1 tablet by mouth Daily., Disp: 90 tablet, Rfl: 3  •  Continuous Blood Gluc  (FreeStyle Kimber 14 Day [employer]) device, 1 each Daily., Disp: 1 each, Rfl: 0  •  Continuous Blood Gluc Sensor (FreeStyle Kimber 14 Day Sensor) misc, 1 each Every 14 (Fourteen) Days., Disp: 2 each, Rfl: 11  •  dexlansoprazole (Dexilant) 60 MG capsule, Take 1 capsule by mouth Daily., Disp: 90 capsule, Rfl: 0  •  glucose blood test strip, Use as instructed, Disp: 100 each, Rfl: 12  •  glucose monitor monitoring kit, 1 each 2 (Two) Times a Day., Disp: 1 each, Rfl: 0  •  HYDROcodone-acetaminophen (NORCO) 5-325 MG per tablet, Take 1 tablet by mouth Every 8 (Eight) Hours As Needed., Disp: 7 tablet, Rfl: 0  •  Lancets (freestyle) lancets, Use as directed, Disp: 100 each, Rfl: 12  •  losartan-hydrochlorothiazide (HYZAAR) 100-25 MG per tablet, Take 1 tablet by mouth Daily., Disp: 90 tablet, Rfl: 1  •  metFORMIN ER (GLUCOPHAGE-XR) 500 MG 24 hr tablet, Take 1 tablet by mouth Daily With Breakfast., Disp: 90 tablet, Rfl: 1  •  multivitamin with minerals tablet tablet, Take 1 tablet by " mouth Daily., Disp: , Rfl:   •  vitamin B-12 (CYANOCOBALAMIN) 1000 MCG tablet, Take 1,000 mcg by mouth Daily., Disp: , Rfl:   •  vitamin D3 125 MCG (5000 UT) capsule capsule, Take 5,000 Units by mouth 2 (Two) Times a Week., Disp: , Rfl: .      The patient's relevant past medical, surgical, family and social history were reviewed and updated in Epic as appropriate.       Review of Systems   Eyes: Positive for visual disturbance.   Respiratory: Positive for apnea.    Gastrointestinal:        Heartburn   Endocrine: Positive for polydipsia.   Musculoskeletal: Positive for neck pain.   Allergic/Immunologic: Positive for environmental allergies.   Neurological: Positive for headaches.   Psychiatric/Behavioral: Positive for sleep disturbance. The patient is nervous/anxious.    All other systems reviewed and are negative.        Objective:    Physical Exam  Constitutional:       Appearance: Normal appearance.   HENT:      Head: Normocephalic and atraumatic.      Mouth/Throat:      Comments: Class 2 airway  Pulmonary:      Effort: Pulmonary effort is normal. No respiratory distress.   Neurological:      Mental Status: She is alert and oriented to person, place, and time.   Psychiatric:         Mood and Affect: Mood normal.         Behavior: Behavior normal.         Thought Content: Thought content normal.         Judgment: Judgment normal.         CPAP Report  90/90 days of use  Greater than 4-hour use 100%  90% pressure 12.8  AHI of 0.2  Settings 10-18    The patient continues to use and benefit from CPAP therapy.    ASSESSMENT/PLAN    Diagnoses and all orders for this visit:    1. Obstructive sleep apnea, adult (Primary)  -     PAP Therapy        1. Counseled patient regarding multimodal approach with healthy nutrition, healthy sleep, regular physical activity, social activities, counseling, and medications. Encouraged to practice lateral sleep position. Avoid alcohol and sedatives close to bedtime.  2.   Refill supplies  x1 year.  Return to clinic 1 year sooner symptoms warrant.  Patient gave consent for video visit.    I have reviewed the results of my evaluation and impression and discussed my recommendations in detail with the patient.      Signed by  Hailee Jean, GREG    April 7, 2023      CC: Chiquita Acosta,          No ref. provider found

## 2023-04-13 ENCOUNTER — HOSPITAL ENCOUNTER (OUTPATIENT)
Dept: PHYSICAL THERAPY | Facility: HOSPITAL | Age: 63
Setting detail: THERAPIES SERIES
Discharge: HOME | End: 2023-04-13
Payer: MEDICAID

## 2023-04-13 DIAGNOSIS — C50.411 MALIGNANT NEOPLASM OF UPPER-OUTER QUADRANT OF RIGHT BREAST IN FEMALE, ESTROGEN RECEPTOR POSITIVE: Primary | ICD-10-CM

## 2023-04-13 DIAGNOSIS — M79.601 RIGHT ARM PAIN: ICD-10-CM

## 2023-04-13 DIAGNOSIS — Z17.0 MALIGNANT NEOPLASM OF UPPER-OUTER QUADRANT OF RIGHT BREAST IN FEMALE, ESTROGEN RECEPTOR POSITIVE: Primary | ICD-10-CM

## 2023-04-13 PROCEDURE — 97140 MANUAL THERAPY 1/> REGIONS: CPT

## 2023-04-13 PROCEDURE — 97110 THERAPEUTIC EXERCISES: CPT

## 2023-04-13 NOTE — THERAPY TREATMENT NOTE
Outpatient Physical Therapy  Treatment Note   [place]     Patient Name: Mirna Saavedra  : 1960  MRN: [id number]  Today's Date: 2023        Visit Date: 2023    Visit Dx:    ICD-10-CM ICD-9-CM   1. Malignant neoplasm of upper-outer quadrant of right breast in female, estrogen receptor positive  C50.411 174.4    Z17.0 V86.0   2. Right arm pain  M79.601 729.5       Patient Active Problem List   Diagnosis   • Acid reflux   • Benign essential hypertension   • Elevated liver enzymes   • Migraine   • Uterine leiomyoma   • EMILY positive   • Erythrocytosis   • High serum ferritin   • Controlled type 2 diabetes mellitus without complication, without long-term current use of insulin (HCC)   • Chronic neck pain   • Malignant neoplasm of upper-outer quadrant of right breast in female, estrogen receptor positive         Lymphedema     Row Name 23 1400             Subjective Pain    Able to rate subjective pain? yes  -LF      Pre-Treatment Pain Level 3  -LF      Post-Treatment Pain Level 3  -LF         Subjective Comments    Subjective Comments arm is less sensitive.  taping helpful.  Tender lateral chest  -LF         Manual Lymphatic Drainage    Manual Lymphatic Drainage initial sequence;opened regional lymph nodes;extremity treatment;astym  -LF      Initial Sequence supraclavicular;shoulder collectors;diaphragmatic breathing  -LF      Supraclavicular right;left  -LF      Shoulder Collectors right;left  -LF      Diaphragmatic Breathing completed x5  -LF      Opened Regional Lymph Nodes axillary  -LF      Axillary right  -LF      Extremity Treatment MLD to full limb  -LF      MLD to Full Limb RUE, focus upper arm  -LF      Astym other astym  -LF      Other Astym Gentle ASTYM with limited strokes to R shoulder, upper chest, lower rib, and lateral chest.  F/B gentle STM and stretching  -LF      Manual Lymphatic Drainage Comments MLD R upper and lateral chest well working towards R axilla  -LF       Manual Therapy Elastic taping:  applied single strip tape anchored proximal elbow and 25 stretch applied along tricep to posterior axillary border (this is area with most sensitivity).  Prior to taping, stroking with textured towel to same area with good tolerance  -LF            User Key  (r) = Recorded By, (t) = Taken By, (c) = Cosigned By    Initials Name Provider Type    Donita Mcdonald PT Physical Therapist                     PT Assessment/Plan     Row Name 04/13/23 1400          PT Assessment    Assessment Comments Tolerates light and firmer strokes posterior upper arm with less hypersensitivity.  Some post-radiation tightness and ther ex progressed.  -LF        PT Plan    PT Plan Comments updated POC  -LF           User Key  (r) = Recorded By, (t) = Taken By, (c) = Cosigned By    Initials Name Provider Type     Donita Up PT Physical Therapist                    OP Exercises     Row Name 04/13/23 1400             Subjective Comments    Subjective Comments arm is less sensitive.  taping helpful.  Tender lateral chest  -LF         Subjective Pain    Able to rate subjective pain? yes  -LF      Pre-Treatment Pain Level 3  -LF      Post-Treatment Pain Level 3  -LF         Total Minutes    48976 - PT Therapeutic Exercise Minutes 10  -LF      33306 - PT Manual Therapy Minutes 45  -LF         Exercise 1    Exercise Name 1 HBH open/close, hands clasped overhed flexion; LTR with HBH and overhead; single-arm doorway stretch for RUE; seated HBH sidebending; overhead reach with sidebending  -LF            User Key  (r) = Recorded By, (t) = Taken By, (c) = Cosigned By    Initials Name Provider Type     Donita Up PT Physical Therapist                Manual Rx (last 36 hours)     Manual Treatments     Row Name 04/13/23 1400             Total Minutes    44976 - PT Manual Therapy Minutes 45  -LF            User Key  (r) = Recorded By, (t) = Taken By, (c) = Cosigned By    Initials Name Provider Type      Donita Up, PT Physical Therapist                Time Calculation:   Start Time: 1400  Timed Charges  87262 - PT Therapeutic Exercise Minutes: 10  72269 - PT Manual Therapy Minutes: 45  Total Minutes  Timed Charges Total Minutes: 55   Total Minutes: 55   Therapy Charges for Today     Code Description Service Date Service Provider Modifiers Qty    [id number]  PT THER PROC EA 15 MIN 4/13/2023 Donita Up, PT GP 1    [id number]  PT MANUAL THERAPY EA 15 MIN 4/13/2023 Donita Up, PT GP 3                    Donita Up, PT  4/13/2023

## 2023-04-18 ENCOUNTER — OFFICE VISIT (OUTPATIENT)
Dept: ONCOLOGY | Facility: CLINIC | Age: 63
End: 2023-04-18
Payer: MEDICAID

## 2023-04-18 VITALS
RESPIRATION RATE: 20 BRPM | HEART RATE: 88 BPM | DIASTOLIC BLOOD PRESSURE: 82 MMHG | WEIGHT: 230 LBS | OXYGEN SATURATION: 97 % | BODY MASS INDEX: 39.27 KG/M2 | SYSTOLIC BLOOD PRESSURE: 168 MMHG | HEIGHT: 64 IN | TEMPERATURE: 97.2 F

## 2023-04-18 DIAGNOSIS — Z17.0 MALIGNANT NEOPLASM OF UPPER-OUTER QUADRANT OF RIGHT BREAST IN FEMALE, ESTROGEN RECEPTOR POSITIVE: Primary | ICD-10-CM

## 2023-04-18 DIAGNOSIS — C50.411 MALIGNANT NEOPLASM OF UPPER-OUTER QUADRANT OF RIGHT BREAST IN FEMALE, ESTROGEN RECEPTOR POSITIVE: Primary | ICD-10-CM

## 2023-04-18 PROCEDURE — 1125F AMNT PAIN NOTED PAIN PRSNT: CPT | Performed by: NURSE PRACTITIONER

## 2023-04-18 PROCEDURE — 1160F RVW MEDS BY RX/DR IN RCRD: CPT | Performed by: NURSE PRACTITIONER

## 2023-04-18 PROCEDURE — 3077F SYST BP >= 140 MM HG: CPT | Performed by: NURSE PRACTITIONER

## 2023-04-18 PROCEDURE — 3079F DIAST BP 80-89 MM HG: CPT | Performed by: NURSE PRACTITIONER

## 2023-04-18 PROCEDURE — 99213 OFFICE O/P EST LOW 20 MIN: CPT | Performed by: NURSE PRACTITIONER

## 2023-04-18 PROCEDURE — 1159F MED LIST DOCD IN RCRD: CPT | Performed by: NURSE PRACTITIONER

## 2023-04-18 NOTE — PROGRESS NOTES
"    PROBLEM LIST:  1. [id number] ER positive (80%), IL+ (70%), HER2 negative (0+) invasive ductal carcinoma of the right breast  A) right breast lumpectomy on 12/2/2022 showed a low-grade invasive ductal carcinoma measuring 1.4 cm.  0 of 3 lymph nodes involved.  oncotype 18.  B) radiotherapy completed 2/24/2023.  Anastrozole started March 2023  2. DM  3. Anxiety  4. GERD  5. HTN    Subjective     CHIEF COMPLAINT: breast cancer    HISTORY OF PRESENT ILLNESS:   Mirna Saavedra returns for follow-up.   She completed radiation to right breast 2/24/2023.  She started anastrozole March 2023.  She is tolerating the anastrozole well.  She denies any hot flashes or arthralgias.  She does have occasional stabbing pain in bilateral breast.  She has noticed some hair thinning since starting the anastrozole.      Objective      /82   Pulse 88   Temp 97.2 °F (36.2 °C) (Temporal)   Resp 20   Ht 162.6 cm (64\")   Wt 104 kg (230 lb)   SpO2 97% Comment: RA  BMI 39.48 kg/m²    Vitals:    04/18/23 1423   PainSc:   3   PainLoc: Chest             Performance Status:0  ECOG score: 0           General: well appearing female in no acute distress  Neuro: alert and oriented  HEENT: sclerae anicteric, oropharynx clear  Extremities: no lower extremity edema  Skin: no rashes, lesions, bruising, or petechiae  Psych: mood and affect appropriate        RECENT LABS:  Lab Results   Component Value Date    WBC 3.95 03/15/2023    HGB 9.1 (L) 03/15/2023    HCT 31.7 (L) 03/15/2023    MCV 71.9 (L) 03/15/2023     (L) 03/15/2023       Lab Results   Component Value Date    GLUCOSE 176 (H) 03/15/2023    BUN 10 03/15/2023    CREATININE 0.53 (L) 03/15/2023    EGFRIFNONA 95 01/24/2022    BCR 18.9 03/15/2023    K 4.0 03/15/2023    CO2 22.0 03/15/2023    CALCIUM 9.3 03/15/2023    ALBUMIN 4.0 03/15/2023    AST 61 (H) 03/15/2023    ALT 32 03/15/2023                   ASSESSMENT AND PLAN:     Mirna Ramirez Quentin is a 62 y.o. female  with stage IA " ER+ Her2 negative IDC of the right breast.       She completed radiation to right breast 2/24/2023.  She started anastrozole March 2023.  She is tolerating the anastrozole well.  We will plan on continuing anastrozole for minimum of 5 years.    Her mammogram is scheduled for 8/24/2023.    Bone density scan 3/15/2023 was normal.  Continue vitamin D supplement and dietary calcium along with weightbearing exercise such as walking 30 minutes 5 days a week.  We will repeat bone density scan in 2 years.      Follow-up in 6 months.                     Cherri Sampson, GREG  [facility]    4/18/2023          CC:

## 2023-05-01 ENCOUNTER — HOSPITAL ENCOUNTER (OUTPATIENT)
Dept: PHYSICAL THERAPY | Facility: HOSPITAL | Age: 63
Setting detail: THERAPIES SERIES
Discharge: HOME | End: 2023-05-01
Payer: MEDICAID

## 2023-05-01 DIAGNOSIS — Z17.0 MALIGNANT NEOPLASM OF UPPER-OUTER QUADRANT OF RIGHT BREAST IN FEMALE, ESTROGEN RECEPTOR POSITIVE: Primary | ICD-10-CM

## 2023-05-01 DIAGNOSIS — M79.601 RIGHT ARM PAIN: ICD-10-CM

## 2023-05-01 DIAGNOSIS — C50.411 MALIGNANT NEOPLASM OF UPPER-OUTER QUADRANT OF RIGHT BREAST IN FEMALE, ESTROGEN RECEPTOR POSITIVE: Primary | ICD-10-CM

## 2023-05-01 PROCEDURE — 97140 MANUAL THERAPY 1/> REGIONS: CPT

## 2023-05-01 PROCEDURE — 97110 THERAPEUTIC EXERCISES: CPT

## 2023-05-01 NOTE — THERAPY PROGRESS REPORT/RE-CERT
Physical Therapy  Progress Note  [facility]     Patient Name: Mirna Saavedra  : 1960  MRN: [id number]  Today's Date: 2023      Visit Date: 2023    Visit Dx:    ICD-10-CM ICD-9-CM   1. Malignant neoplasm of upper-outer quadrant of right breast in female, estrogen receptor positive  C50.411 174.4    Z17.0 V86.0   2. Right arm pain  M79.601 729.5       Patient Active Problem List   Diagnosis   • Acid reflux   • Benign essential hypertension   • Elevated liver enzymes   • Migraine   • Uterine leiomyoma   • EMILY positive   • Erythrocytosis   • High serum ferritin   • Controlled type 2 diabetes mellitus without complication, without long-term current use of insulin (HCC)   • Chronic neck pain   • Malignant neoplasm of upper-outer quadrant of right breast in female, estrogen receptor positive        Past Medical History:   Diagnosis Date   • Anxiety    • Breast cancer    • Diabetes mellitus    • Elevated ferritin    • Elevated liver enzymes    • Fatigue    • GERD (gastroesophageal reflux disease)    • Hair loss    • Headache    • History of radiation therapy 2023    Right Breast   • Hypertension    • Infectious viral hepatitis     A   • Low back pain    • Mitral valve prolapse    • Obesity    • Ovarian cyst    • Peptic ulceration    • Pneumonia         Past Surgical History:   Procedure Laterality Date   • BREAST BIOPSY Right    • BREAST LUMPECTOMY Bilateral 2022    Left Breast is benign   • COLONOSCOPY     • EXPLORATORY LAPAROTOMY     • HAND SURGERY Left     torn ligament repair   • HYSTERECTOMY     • HYSTEROSCOPY     • HYSTEROSCOPY     • SKIN BIOPSY      multiple       Visit Dx:    ICD-10-CM ICD-9-CM   1. Malignant neoplasm of upper-outer quadrant of right breast in female, estrogen receptor positive  C50.411 174.4    Z17.0 V86.0   2. Right arm pain  M79.601 729.5            Lymphedema     Row Name 23 1400              Subjective Pain    Able to rate subjective pain? yes  -LF      Pre-Treatment Pain Level 3  -LF      Post-Treatment Pain Level 0  -LF      Subjective Pain Comment 3/10 shoulder pain with movement, 0/10 at rest  -LF         Subjective Comments    Subjective Comments arm sensitivity not as constant.  Continues with some pain and soreness in her lateral chest and axilla.  -LF         Posture/Observations    Alignment Options Forward head;Cervical lordosis;Rounded shoulders  -LF      Forward Head Mild  -LF      Cervical Lordosis Decreased  -LF      Rounded Shoulders Mild  -LF      Posture/Observations Comments Tender R proximal biceps, AC joint, Upper trap  -LF         General ROM    GENERAL ROM COMMENTS moderate limited cervical ROM all directions.  Limited R>L end range flexion and abd ~10%.  HBH and HBB WNL's, tightness and pull on R  -LF         MMT (Manual Muscle Testing)    General MMT Comments R shoulder abd and flex 4+/5 w/ increased pain, 5/5 at B elbow, 4+/5 at B wrist.  L shoulder 4+/5  -LF         Manual Lymphatic Drainage    Manual Lymphatic Drainage initial sequence;opened regional lymph nodes;extremity treatment;astym  -LF      Initial Sequence supraclavicular;shoulder collectors;diaphragmatic breathing  -LF      Supraclavicular right;left  -LF      Shoulder Collectors right;left  -LF      Diaphragmatic Breathing completed  -LF      Opened Regional Lymph Nodes axillary;inguinal  -LF      Axillary right  -LF      Inguinal right  -LF      Extremity Treatment MLD to full limb  -LF      Astym other astym  -LF      Other Astym Gentle ASTYM to R shoulder, upper chest, lower rib, and lateral chest.  Also in sidelying for ASTM to lateral chest and periscapular region.   F/B gentle STM and stretching.  tightness/tender posterior axillary border  -LF      Manual Lymphatic Drainage Comments MLD R upper and lateral chest well working towards R axilla  -LF      Manual Therapy Elastic taping:  applied single strip tape  anchored proximal elbow and <25 stretch applied along tricep to posterior axillary border (this is area with most sensitivity).  -LF            User Key  (r) = Recorded By, (t) = Taken By, (c) = Cosigned By    Initials Name Provider Type    Donita Mcdonald PT Physical Therapist                   PT Ortho     Row Name 05/01/23 1400       Shoulder Impingement/Rotator Cuff Special Tests    Pitt-Alon Test (RC Lesion vs. Bursitis) Right:;Negative  -LF    Neer Impingement Test (RC Lesion vs. Bursitis) Right:;Positive  -LF    Empty Can Test (RC Lesion) Right:;Positive  -LF          User Key  (r) = Recorded By, (t) = Taken By, (c) = Cosigned By    Initials Name Provider Type    Donita Mcdonald PT Physical Therapist                  Therapy Education  Education Details: updated HEP with RROM exercises completed today (supine shldr flex, horz. abd, single arm horz. abd, B ER)  Given: HEP, Symptoms/condition management  Program: Reinforced, Progressed  How Provided: Verbal, Demonstration, Written  Provided to: Patient  Level of Understanding: Teach back education performed, Verbalized, Demonstrated       OP Exercises     Row Name 05/01/23 1400             Subjective Comments    Subjective Comments arm sensitivity not as constant.  Continues with some pain and soreness in her lateral chest and axilla.  -LF         Subjective Pain    Able to rate subjective pain? yes  -LF      Pre-Treatment Pain Level 3  -LF      Post-Treatment Pain Level 0  -LF      Subjective Pain Comment 3/10 shoulder pain with movement, 0/10 at rest  -LF         Total Minutes    51688 - PT Therapeutic Exercise Minutes 10  -LF      83306 - PT Manual Therapy Minutes 50  -LF         Exercise 1    Exercise Name 1 HBH open/close, hands clasped flexion; sidelying horz. abd, abd; supine B shldr flex, ER, horz. abd, single arm ABd with yellow band  -LF            User Key  (r) = Recorded By, (t) = Taken By, (c) = Cosigned By    Initials Name  Provider Type     Donita Up PT Physical Therapist                  Manual Rx (last 36 hours)     Manual Treatments     Row Name 05/01/23 1400             Total Minutes    00995 - PT Manual Therapy Minutes 50  -LF            User Key  (r) = Recorded By, (t) = Taken By, (c) = Cosigned By    Initials Name Provider Type     Donita Up PT Physical Therapist                 PT OP Goals     Row Name 05/01/23 1400          PT Short Term Goals    STG Date to Achieve 04/27/23  -LF     STG 1 Patient to report 50% improvement R UE parasthesia and hypersensitivity  -LF     STG 1 Progress Progressing;Ongoing  -LF     STG 2 Quick DASH improved by 10 points  -LF     STG 2 Progress Progressing;Ongoing  -LF        Long Term Goals    LTG Date to Achieve 06/26/23  -LF     LTG 1 Patient independent with HEP for management of symptoms.  -LF     LTG 1 Progress Progressing;Ongoing  -LF     LTG 2 Patient to report R shoulder pain </= 2/10 with reaching overhead and behind the back.  -LF     LTG 2 Progress Ongoing  -LF     LTG 3 Patient able to tolerate touch and clothing without onset of RUEpain and parasthesias  -LF     LTG 3 Progress Progressing;Ongoing  -LF     LTG 4 R shoulder strength improved to 4+/5 and without increased pain.  -LF     LTG 4 Progress Progressing;Ongoing  -LF        Time Calculation    PT Goal Re-Cert Due Date 06/14/23  -LF           User Key  (r) = Recorded By, (t) = Taken By, (c) = Cosigned By    Initials Name Provider Type     Donita Up, PT Physical Therapist                 PT Assessment/Plan     Row Name 05/01/23 1400          PT Assessment    Functional Limitations Performance in leisure activities;Performance in self-care ADL;Limitations in community activities  -     Impairments Pain;Muscle strength;Posture;Range of motion;Sensation;Impaired flexibility  -     Assessment Comments Upper arm sensitivity improving and less constant.  Continues with R shoulder pain consistent  with impingement syndrome.  Post-radiation tightness likely contributing factor.  Will continue to address flexbility, as well as strength deficits to promote decreased pain and maximize function.  -LF     Please refer to paper survey for additional self-reported information Yes  -LF     Rehab Potential Good  -LF     Patient/caregiver participated in establishment of treatment plan and goals Yes  -LF     Patient would benefit from skilled therapy intervention Yes  -LF        PT Plan    PT Frequency 1x/week  -LF     Planned CPT's? PT EVAL LOW COMPLEXITY: 65948;PT THER PROC EA 15 MIN: 92518;PT THER ACT EA 15 MIN: 44309;PT MANUAL THERAPY EA 15 MIN: 16474;PT SELF CARE/HOME MGMT/TRAIN EA 15: 44943  -LF     PT Plan Comments cont. per POC.  Progress HEP to include RTC/scapular strengthening and stabilization  -LF           User Key  (r) = Recorded By, (t) = Taken By, (c) = Cosigned By    Initials Name Provider Type    Donita Mcdonald, PT Physical Therapist                   Outcome Measure Options: Quick DASH  Quick DASH  Open a tight or new jar.: Mild Difficulty  Do heavy household chores (e.g., wash walls, wash floors): Moderate Difficulty  Carry a shopping bag or briefcase: No Difficulty  Wash your back: Mild Difficulty  Use a knife to cut food: No Difficulty  Recreational activities in which you take some force or impact through your arm, should or hand (e.g. golf, hammering, tennis, etc.): Mild Difficulty  During the past week, to what extent has your arm, shoulder, or hand problem interfered with your normal social activites with family, friends, neighbors or groups?: Not at all  During the past week, were you limited in your work or other regular daily activities as a result of your arm, shoulder or hand problem?: Not limited at all  Arm, Shoulder, or hand pain: Mild  Tingling (pins and needles) in your arm, shoulder, or hand: Mild  During the past week, how much difficulty have you had sleeping because of the  pain in your arm, shoulder or hand?: No difficulty  Number of Questions Answered: 11  Quick DASH Score: 15.91         Time Calculation:   Start Time: 1400  Timed Charges  02614 - PT Therapeutic Exercise Minutes: 10  84274 - PT Manual Therapy Minutes: 50  Total Minutes  Timed Charges Total Minutes: 60   Total Minutes: 60   Therapy Charges for Today     Code Description Service Date Service Provider Modifiers Qty    [id number]  PT THER PROC EA 15 MIN 5/1/2023 Donita Up, PT GP 1    [id number]  PT MANUAL THERAPY EA 15 MIN 5/1/2023 Donita Up, PT GP 3          PT G-Codes  Outcome Measure Options: Quick DASH  Quick DASH Score: 15.91         Donita Up, PT  5/1/2023

## 2023-05-15 ENCOUNTER — LAB (OUTPATIENT)
Dept: LAB | Facility: HOSPITAL | Age: 63
End: 2023-05-15
Payer: MEDICAID

## 2023-05-15 ENCOUNTER — OFFICE VISIT (OUTPATIENT)
Dept: INTERNAL MEDICINE | Facility: CLINIC | Age: 63
End: 2023-05-15
Payer: MEDICAID

## 2023-05-15 VITALS
WEIGHT: 234 LBS | DIASTOLIC BLOOD PRESSURE: 80 MMHG | BODY MASS INDEX: 39.95 KG/M2 | HEART RATE: 111 BPM | SYSTOLIC BLOOD PRESSURE: 135 MMHG | OXYGEN SATURATION: 98 % | TEMPERATURE: 97.6 F | HEIGHT: 64 IN

## 2023-05-15 DIAGNOSIS — I10 BENIGN ESSENTIAL HYPERTENSION: Primary | ICD-10-CM

## 2023-05-15 DIAGNOSIS — E11.9 CONTROLLED TYPE 2 DIABETES MELLITUS WITHOUT COMPLICATION, WITHOUT LONG-TERM CURRENT USE OF INSULIN: ICD-10-CM

## 2023-05-15 DIAGNOSIS — I10 BENIGN ESSENTIAL HYPERTENSION: ICD-10-CM

## 2023-05-15 PROCEDURE — 83036 HEMOGLOBIN GLYCOSYLATED A1C: CPT

## 2023-05-15 PROCEDURE — 80053 COMPREHEN METABOLIC PANEL: CPT

## 2023-05-15 PROCEDURE — 80061 LIPID PANEL: CPT

## 2023-05-15 RX ORDER — LIDOCAINE HYDROCHLORIDE 20 MG/ML
SOLUTION OROPHARYNGEAL
COMMUNITY
Start: 2023-02-07 | End: 2023-05-15

## 2023-05-15 NOTE — PROGRESS NOTES
"Subjective   Mirna Saavedra is a 62 y.o. female.   Chief Complaint   Patient presents with   • Hypertension   • Diabetes       History of Present Illness   Here for f/u on chronic conditions: Dm and HTN. DM controlled with Metformin. HTn controlled with Lisinopril HCTZ. No CP or SOA.   The following portions of the patient's history were reviewed and updated as appropriate: allergies, current medications, past family history, past medical history, past social history, past surgical history and problem list.    Review of Systems   Constitutional: Negative for fatigue and fever.   Respiratory: Negative for cough, shortness of breath and stridor.    Cardiovascular: Negative for chest pain and leg swelling.   Gastrointestinal: Negative for nausea and vomiting.   Psychiatric/Behavioral: Negative for confusion.     /80   Pulse 111   Temp 97.6 °F (36.4 °C)   Ht 162.6 cm (64\")   Wt 106 kg (234 lb)   SpO2 98%   BMI 40.17 kg/m²     Objective   Physical Exam  Vitals reviewed.   Cardiovascular:      Rate and Rhythm: Normal rate and regular rhythm.   Pulmonary:      Effort: No respiratory distress.      Breath sounds: No wheezing.   Neurological:      Mental Status: She is alert and oriented to person, place, and time.   Psychiatric:         Behavior: Behavior normal.         Assessment & Plan   Diagnoses and all orders for this visit:    1. Benign essential hypertension (Primary)  -     Comprehensive Metabolic Panel; Future  -     Lipid Panel; Future  -     Comprehensive Metabolic Panel; Future  Continue Losartan HCTZ 110/25 mg po qd  2. Controlled type 2 diabetes mellitus without complication, without long-term current use of insulin (HCC)  -     Hemoglobin A1c; Future  Continue Metformin  mg po qd             "

## 2023-05-16 ENCOUNTER — TELEPHONE (OUTPATIENT)
Dept: INTERNAL MEDICINE | Facility: CLINIC | Age: 63
End: 2023-05-16
Payer: MEDICAID

## 2023-05-16 DIAGNOSIS — E87.1 HYPONATREMIA: Primary | ICD-10-CM

## 2023-05-16 LAB
ALBUMIN SERPL-MCNC: 4.3 G/DL (ref 3.5–5.2)
ALBUMIN/GLOB SERPL: 1.3 G/DL
ALP SERPL-CCNC: 97 U/L (ref 39–117)
ALT SERPL W P-5'-P-CCNC: 25 U/L (ref 1–33)
ANION GAP SERPL CALCULATED.3IONS-SCNC: 14.9 MMOL/L (ref 5–15)
AST SERPL-CCNC: 48 U/L (ref 1–32)
BILIRUB SERPL-MCNC: 1.1 MG/DL (ref 0–1.2)
BUN SERPL-MCNC: 10 MG/DL (ref 8–23)
BUN/CREAT SERPL: 20 (ref 7–25)
CALCIUM SPEC-SCNC: 9.7 MG/DL (ref 8.6–10.5)
CHLORIDE SERPL-SCNC: 93 MMOL/L (ref 98–107)
CHOLEST SERPL-MCNC: 162 MG/DL (ref 0–200)
CO2 SERPL-SCNC: 20.1 MMOL/L (ref 22–29)
CREAT SERPL-MCNC: 0.5 MG/DL (ref 0.57–1)
EGFRCR SERPLBLD CKD-EPI 2021: 104.9 ML/MIN/1.73
GLOBULIN UR ELPH-MCNC: 3.2 GM/DL
GLUCOSE SERPL-MCNC: 209 MG/DL (ref 65–99)
HBA1C MFR BLD: 6.7 % (ref 4.8–5.6)
HDLC SERPL-MCNC: 65 MG/DL (ref 40–60)
LDLC SERPL CALC-MCNC: 83 MG/DL (ref 0–100)
LDLC/HDLC SERPL: 1.27 {RATIO}
POTASSIUM SERPL-SCNC: 3.8 MMOL/L (ref 3.5–5.2)
PROT SERPL-MCNC: 7.5 G/DL (ref 6–8.5)
SODIUM SERPL-SCNC: 128 MMOL/L (ref 136–145)
TRIGL SERPL-MCNC: 72 MG/DL (ref 0–150)
VLDLC SERPL-MCNC: 14 MG/DL (ref 5–40)

## 2023-05-16 NOTE — TELEPHONE ENCOUNTER
I called and spoke with patient and advised her of her results she will be coming in next week for a recheck. Patient verbalized understanding.

## 2023-05-16 NOTE — TELEPHONE ENCOUNTER
----- Message from Chiquita Acosta DO sent at 5/16/2023  7:40 AM EDT -----  Decreased sodium. Needs repeat labs next week.  Mild increase in liver enzymes.

## 2023-05-22 ENCOUNTER — HOSPITAL ENCOUNTER (OUTPATIENT)
Dept: PHYSICAL THERAPY | Facility: HOSPITAL | Age: 63
Setting detail: THERAPIES SERIES
Discharge: HOME | End: 2023-05-22
Payer: MEDICAID

## 2023-05-22 DIAGNOSIS — C50.411 MALIGNANT NEOPLASM OF UPPER-OUTER QUADRANT OF RIGHT BREAST IN FEMALE, ESTROGEN RECEPTOR POSITIVE: Primary | ICD-10-CM

## 2023-05-22 DIAGNOSIS — M79.601 RIGHT ARM PAIN: ICD-10-CM

## 2023-05-22 DIAGNOSIS — Z17.0 MALIGNANT NEOPLASM OF UPPER-OUTER QUADRANT OF RIGHT BREAST IN FEMALE, ESTROGEN RECEPTOR POSITIVE: Primary | ICD-10-CM

## 2023-05-22 PROCEDURE — 97110 THERAPEUTIC EXERCISES: CPT

## 2023-05-22 NOTE — THERAPY TREATMENT NOTE
Outpatient Physical Therapy Lymphedema Treatment Note   [place]     Patient Name: Mirna Saavedra  : 1960  MRN: [id number]  Today's Date: 2023        Visit Date: 2023    Visit Dx:    ICD-10-CM ICD-9-CM   1. Malignant neoplasm of upper-outer quadrant of right breast in female, estrogen receptor positive  C50.411 174.4    Z17.0 V86.0   2. Right arm pain  M79.601 729.5       Patient Active Problem List   Diagnosis   • Acid reflux   • Benign essential hypertension   • Elevated liver enzymes   • Migraine   • Uterine leiomyoma   • EMILY positive   • Erythrocytosis   • High serum ferritin   • Controlled type 2 diabetes mellitus without complication, without long-term current use of insulin (HCC)   • Chronic neck pain   • Malignant neoplasm of upper-outer quadrant of right breast in female, estrogen receptor positive         Lymphedema     Row Name 23 1400             Subjective Pain    Able to rate subjective pain? yes  -LF      Pre-Treatment Pain Level 4  -LF      Post-Treatment Pain Level 2  -LF         Subjective Comments    Subjective Comments c/o discomfort under R arm.  Upper arm sensitivity mostly resolved.  No longer limiting clothing she wears  -LF         Manual Lymphatic Drainage    Manual Lymphatic Drainage initial sequence;opened regional lymph nodes;extremity treatment;astym  -LF      Initial Sequence supraclavicular;shoulder collectors;diaphragmatic breathing  -LF      Supraclavicular right;left  -LF      Shoulder Collectors right;left  -LF      Opened Regional Lymph Nodes axillary;inguinal  -LF      Axillary right  -LF      Inguinal right  -LF      Extremity Treatment MLD to full limb  -LF      MLD to Full Limb RUE  -LF      Other Astym manual techniques including TP release, bending and stretching techniques to address soft-tissue restrictions subscap, lateral scapular border, intercostal spaces on R.  Worked in supine and sidelying.  Included stretch into flex and  scaption with stabilization at scapula,  -LF            User Key  (r) = Recorded By, (t) = Taken By, (c) = Cosigned By    Initials Name Provider Type    Donita Mcdonald PT Physical Therapist                 PT Assessment/Plan     Row Name 05/22/23 1400          PT Assessment    Assessment Comments Patient with some increased post-radiation tightness today.  Presented with tenderness and tightness limiting shoulder flexion.  Improved with treatment  -LF        PT Plan    PT Plan Comments patient to continue with stretches at home.  Will follow-up in 2 weeks.  -LF           User Key  (r) = Recorded By, (t) = Taken By, (c) = Cosigned By    Initials Name Provider Type    LF Donita Up PT Physical Therapist                    OP Exercises     Row Name 05/22/23 1400             Subjective Comments    Subjective Comments c/o discomfort under R arm.  Upper arm sensitivity mostly resolved.  No longer limiting clothing she wears  -LF         Subjective Pain    Able to rate subjective pain? yes  -LF      Pre-Treatment Pain Level 4  -LF      Post-Treatment Pain Level 2  -LF         Total Minutes    81040 - PT Therapeutic Exercise Minutes 15  -LF         Exercise 1    Exercise Name 1 HBH open/close, hands clasped flexion; sidelying horz. abd, abd;  -LF      Reps 1 10  -LF         Exercise 2    Exercise Name 2 supine B shldr flex,  horz. abd, single arm ABd with yellow band  -LF      Reps 2 10  -LF         Exercise 3    Exercise Name 3 rows and sweeps yellow band  -LF      Reps 3 10  -LF         Exercise 4    Exercise Name 4 doorway pectoral stretch  -LF      Time 4 30s  -LF         Exercise 5    Exercise Name 5 seated HBH sidebend and rotation; overhead reach stretch  -LF            User Key  (r) = Recorded By, (t) = Taken By, (c) = Cosigned By    Initials Name Provider Type    Donita Mcdonald PT Physical Therapist                    Time Calculation:   Start Time: 1400  Timed Charges  52854 - PT Therapeutic  Exercise Minutes: 15  Total Minutes  Timed Charges Total Minutes: 15   Total Minutes: 15   Therapy Charges for Today     Code Description Service Date Service Provider Modifiers Qty    [id number]  PT THER PROC EA 15 MIN 5/22/2023 Donita Up, PT GP 1                    Donita Up, PT  5/22/2023

## 2023-05-30 DIAGNOSIS — K21.00 GASTROESOPHAGEAL REFLUX DISEASE WITH ESOPHAGITIS WITHOUT HEMORRHAGE: ICD-10-CM

## 2023-05-30 RX ORDER — DEXLANSOPRAZOLE 60 MG/1
1 CAPSULE, DELAYED RELEASE ORAL DAILY
Qty: 90 CAPSULE | Refills: 0 | Status: SHIPPED | OUTPATIENT
Start: 2023-05-30

## 2023-06-05 ENCOUNTER — HOSPITAL ENCOUNTER (OUTPATIENT)
Dept: PHYSICAL THERAPY | Facility: HOSPITAL | Age: 63
Setting detail: THERAPIES SERIES
Discharge: HOME | End: 2023-06-05
Payer: MEDICAID

## 2023-06-05 DIAGNOSIS — M79.601 RIGHT ARM PAIN: Primary | ICD-10-CM

## 2023-06-05 DIAGNOSIS — C50.411 MALIGNANT NEOPLASM OF UPPER-OUTER QUADRANT OF RIGHT BREAST IN FEMALE, ESTROGEN RECEPTOR POSITIVE: ICD-10-CM

## 2023-06-05 DIAGNOSIS — Z17.0 MALIGNANT NEOPLASM OF UPPER-OUTER QUADRANT OF RIGHT BREAST IN FEMALE, ESTROGEN RECEPTOR POSITIVE: ICD-10-CM

## 2023-06-05 PROCEDURE — 97110 THERAPEUTIC EXERCISES: CPT

## 2023-06-05 PROCEDURE — 97140 MANUAL THERAPY 1/> REGIONS: CPT

## 2023-06-05 NOTE — THERAPY PROGRESS REPORT/RE-CERT
Physical Therapy  Progress Note  [facility]     Patient Name: Mirna Saavedra  : 1960  MRN: [id number]  Today's Date: 2023      Visit Date: 2023    Visit Dx:    ICD-10-CM ICD-9-CM   1. Right arm pain  M79.601 729.5       Patient Active Problem List   Diagnosis    Acid reflux    Benign essential hypertension    Elevated liver enzymes    Migraine    Uterine leiomyoma    EMILY positive    Erythrocytosis    High serum ferritin    Controlled type 2 diabetes mellitus without complication, without long-term current use of insulin (HCC)    Chronic neck pain    Malignant neoplasm of upper-outer quadrant of right breast in female, estrogen receptor positive        Past Medical History:   Diagnosis Date    Anxiety     Breast cancer     Diabetes mellitus     Elevated ferritin     Elevated liver enzymes     Fatigue     GERD (gastroesophageal reflux disease)     Hair loss     Headache 1970    History of radiation therapy 2023    Right Breast    Hypertension     Infectious viral hepatitis 1995    A    Low back pain 1998    Mitral valve prolapse     Obesity 2008    Ovarian cyst     Peptic ulceration     Pneumonia 1976        Past Surgical History:   Procedure Laterality Date    BREAST BIOPSY Right 2010    BREAST LUMPECTOMY Bilateral 2022    Left Breast is benign    COLONOSCOPY      EXPLORATORY LAPAROTOMY  1993    HAND SURGERY Left 1987    torn ligament repair    HYSTERECTOMY  2006    HYSTEROSCOPY      HYSTEROSCOPY  1993    SKIN BIOPSY      multiple       Visit Dx:    ICD-10-CM ICD-9-CM   1. Right arm pain  M79.601 729.5            Lymphedema       Row Name 23 1400             Subjective Pain    Able to rate subjective pain? yes  -LF      Pre-Treatment Pain Level 8  with stretch  -LF      Post-Treatment Pain Level 0  no pain at rest  -LF         Subjective Comments    Subjective Comments increased chest wall pain and tightness  -LF         Lymphedema Assessment    Lymphedema  Classification RUE:;at risk/stage 0  -LF      Chemo Received no  -LF      Radiation Therapy Received yes  -LF      Infections or Cellulitis? no  -LF         Posture/Observations    Alignment Options Forward head;Cervical lordosis;Rounded shoulders  -LF      Forward Head Mild  -LF      Cervical Lordosis Decreased  -LF      Rounded Shoulders Mild  -LF         General ROM    GENERAL ROM COMMENTS R/L [id number]/135, [id number]/120  -LF         RUE Quick Girth (cm)    Axilla 42.5 cm  -LF      Mid upper arm 41 cm  -LF      Elbow 28.5 cm  -LF      Mid forearm 26 cm  -LF      Wrist crease 16.5 cm  -LF      Web space 19 cm  -LF      Met-heads 19 cm  -LF      RUE Quick Girth Total 192.5  -LF         Manual Lymphatic Drainage    Manual Lymphatic Drainage initial sequence;opened regional lymph nodes;extremity treatment;astym  -LF      Initial Sequence supraclavicular;shoulder collectors;diaphragmatic breathing  -LF      Supraclavicular right;left  -LF      Shoulder Collectors right;left  -LF      Opened Regional Lymph Nodes axillary;inguinal  -LF      Axillary right  -LF      Inguinal right  -LF      Extremity Treatment MLD to proximal limb only  -LF      MLD to Proximal Limb Only UE  -LF      Astym other astym  -LF      Other Astym manual techniques including TP release, bending and stretching techniques to address soft-tissue restrictions subscap, lateral scapular border, intercostal spaces on R, pectorals. Worked in supine and sidelying. Included stretch into flex and scaption with stabilization at scapula,  -LF                User Key  (r) = Recorded By, (t) = Taken By, (c) = Cosigned By      Initials Name Provider Type     Donita Up PT Physical Therapist                       OP Exercises       Row Name 06/05/23 1500 06/05/23 1400          Subjective Comments    Subjective Comments -- increased chest wall pain and tightness  -        Subjective Pain    Able to rate subjective pain? -- yes  -LF     Pre-Treatment Pain  Level -- 8  with stretch  -LF     Post-Treatment Pain Level -- 0  no pain at rest  -LF        Total Minutes    17668 - PT Therapeutic Exercise Minutes 10  -LF --     26241 - PT Manual Therapy Minutes 45  -LF --        Exercise 1    Exercise Name 1 -- HBH open/close, AAROM cane flexion; sidelying horz. abd, abd;  -LF        Exercise 2    Exercise Name 2 -- seated thoracic ext with rolled towel  -LF     Reps 2 -- 10  -LF        Exercise 3    Exercise Name 3 -- doorway stretch low arms  -LF               User Key  (r) = Recorded By, (t) = Taken By, (c) = Cosigned By      Initials Name Provider Type    LF Donita Up, PT Physical Therapist                            Manual Rx (last 36 hours)       Manual Treatments       Row Name 06/05/23 1500             Total Minutes    26424 - PT Manual Therapy Minutes 45  -LF                User Key  (r) = Recorded By, (t) = Taken By, (c) = Cosigned By      Initials Name Provider Type    LF Donita Up, PT Physical Therapist                     PT OP Goals       Row Name 06/05/23 1400          PT Short Term Goals    STG Date to Achieve 07/17/23  -LF     STG 1 Patient to report 50% improvement R UE parasthesia and hypersensitivity  -LF     STG 1 Progress Met  -LF     STG 2 Quick DASH improved by 10 points  -LF     STG 2 Progress Ongoing  -LF     STG 3 Patient to report 50% improvement in pain and tightness  -LF     STG 3 Progress New  -LF        Long Term Goals    LTG Date to Achieve 07/31/23  -LF     LTG 1 Patient independent with HEP for management of symptoms.  -LF     LTG 1 Progress Progressing;Ongoing  -LF     LTG 2 Patient to report R shoulder pain </= 2/10 with reaching overhead and behind the back.  -LF     LTG 2 Progress Ongoing  -LF     LTG 3 Patient able to tolerate touch and clothing without onset of RUEpain and parasthesias  -LF     LTG 3 Progress Met  -LF     LTG 4 R shoulder strength improved to 4+/5 and without increased pain.  -LF     LTG 4 Progress  Progressing;Ongoing  -        Time Calculation    PT Goal Re-Cert Due Date 09/03/23  -               User Key  (r) = Recorded By, (t) = Taken By, (c) = Cosigned By      Initials Name Provider Type     Donita Up PT Physical Therapist                     PT Assessment/Plan       Row Name 06/05/23 1400          PT Assessment    Functional Limitations Performance in leisure activities;Performance in self-care ADL;Limitations in community activities  -     Impairments Pain;Muscle strength;Posture;Range of motion;Sensation;Impaired flexibility  -     Assessment Comments RUE parasthesia and hypersensitivity mostly resolved.  Incureased pain and tightness anterior and lateral chest with decreased in shoulder rom.  Improved with treatment.  Will benefit from continued treatment to address post-surgical and radiation limitations for improved functional mobility  -     Please refer to paper survey for additional self-reported information Yes  -LF     Rehab Potential Good  -LF     Patient/caregiver participated in establishment of treatment plan and goals Yes  -LF     Patient would benefit from skilled therapy intervention Yes  -LF        PT Plan    PT Frequency 1x/week  -LF     Planned CPT's? PT EVAL LOW COMPLEXITY: 56464;PT THER PROC EA 15 MIN: 24198;PT THER ACT EA 15 MIN: 85105;PT MANUAL THERAPY EA 15 MIN: 54636;PT SELF CARE/HOME MGMT/TRAIN EA 15: 38805  -LF     PT Plan Comments cont. per POC.  follow-up next week  -               User Key  (r) = Recorded By, (t) = Taken By, (c) = Cosigned By      Initials Name Provider Type     Donita Up PT Physical Therapist                       Outcome Measure Options: Quick DASH  Quick DASH  Open a tight or new jar.: Moderate Difficulty  Do heavy household chores (e.g., wash walls, wash floors): Moderate Difficulty  Carry a shopping bag or briefcase: No Difficulty  Wash your back: Moderate Difficulty  Use a knife to cut food: No Difficulty  Recreational  activities in which you take some force or impact through your arm, should or hand (e.g. golf, hammering, tennis, etc.): Mild Difficulty  During the past week, to what extent has your arm, shoulder, or hand problem interfered with your normal social activites with family, friends, neighbors or groups?: Not at all  During the past week, were you limited in your work or other regular daily activities as a result of your arm, shoulder or hand problem?: Not limited at all  Arm, Shoulder, or hand pain: Moderate  Tingling (pins and needles) in your arm, shoulder, or hand: None  During the past week, how much difficulty have you had sleeping because of the pain in your arm, shoulder or hand?: No difficulty  Number of Questions Answered: 11  Quick DASH Score: 20.45         Time Calculation:   Start Time: 1500  Timed Charges  42919 - PT Therapeutic Exercise Minutes: 10  20223 - PT Manual Therapy Minutes: 45  Total Minutes  Timed Charges Total Minutes: 55   Total Minutes: 55   Therapy Charges for Today       Code Description Service Date Service Provider Modifiers Qty    [id number]  PT THER PROC EA 15 MIN 6/5/2023 Donita Up, PT GP 1    [id number]  PT MANUAL THERAPY EA 15 MIN 6/5/2023 Donita Up, PT GP 3            PT G-Codes  Outcome Measure Options: Quick DASH  Quick DASH Score: 20.45         Donita Up PT  6/5/2023

## 2023-08-24 ENCOUNTER — HOSPITAL ENCOUNTER (OUTPATIENT)
Dept: MAMMOGRAPHY | Facility: HOSPITAL | Age: 63
Discharge: HOME | End: 2023-08-24
Payer: MEDICARE

## 2023-08-24 ENCOUNTER — HOSPITAL ENCOUNTER (OUTPATIENT)
Dept: ULTRASOUND IMAGING | Facility: HOSPITAL | Age: 63
Discharge: HOME | End: 2023-08-24
Payer: MEDICARE

## 2023-08-24 DIAGNOSIS — R92.8 ABNORMAL MAMMOGRAM: ICD-10-CM

## 2023-08-24 PROCEDURE — 77066 DX MAMMO INCL CAD BI: CPT

## 2023-08-24 PROCEDURE — 76642 ULTRASOUND BREAST LIMITED: CPT

## 2023-08-24 PROCEDURE — G0279 TOMOSYNTHESIS, MAMMO: HCPCS

## 2023-08-25 ENCOUNTER — OFFICE VISIT (OUTPATIENT)
Dept: INTERNAL MEDICINE | Facility: CLINIC | Age: 63
End: 2023-08-25
Payer: MEDICARE

## 2023-08-25 ENCOUNTER — LAB (OUTPATIENT)
Dept: LAB | Facility: HOSPITAL | Age: 63
End: 2023-08-25
Payer: MEDICARE

## 2023-08-25 VITALS
BODY MASS INDEX: 40.29 KG/M2 | HEART RATE: 102 BPM | OXYGEN SATURATION: 95 % | TEMPERATURE: 97.7 F | WEIGHT: 236 LBS | DIASTOLIC BLOOD PRESSURE: 79 MMHG | HEIGHT: 64 IN | SYSTOLIC BLOOD PRESSURE: 132 MMHG

## 2023-08-25 DIAGNOSIS — E11.9 CONTROLLED TYPE 2 DIABETES MELLITUS WITHOUT COMPLICATION, WITHOUT LONG-TERM CURRENT USE OF INSULIN: ICD-10-CM

## 2023-08-25 DIAGNOSIS — I10 BENIGN ESSENTIAL HYPERTENSION: ICD-10-CM

## 2023-08-25 DIAGNOSIS — F41.9 ANXIETY: ICD-10-CM

## 2023-08-25 DIAGNOSIS — Z23 NEED FOR VACCINATION: ICD-10-CM

## 2023-08-25 DIAGNOSIS — Z12.11 COLON CANCER SCREENING: ICD-10-CM

## 2023-08-25 DIAGNOSIS — E11.9 CONTROLLED TYPE 2 DIABETES MELLITUS WITHOUT COMPLICATION, WITHOUT LONG-TERM CURRENT USE OF INSULIN: Primary | ICD-10-CM

## 2023-08-25 LAB
CHOLEST SERPL-MCNC: 167 MG/DL (ref 0–200)
EXPIRATION DATE: NORMAL
HBA1C MFR BLD: 6.7 %
HDLC SERPL-MCNC: 70 MG/DL (ref 40–60)
LDLC SERPL CALC-MCNC: 82 MG/DL (ref 0–100)
LDLC/HDLC SERPL: 1.15 {RATIO}
Lab: NORMAL
TRIGL SERPL-MCNC: 83 MG/DL (ref 0–150)
VLDLC SERPL-MCNC: 15 MG/DL (ref 5–40)

## 2023-08-25 PROCEDURE — 85007 BL SMEAR W/DIFF WBC COUNT: CPT

## 2023-08-25 PROCEDURE — 85025 COMPLETE CBC W/AUTO DIFF WBC: CPT

## 2023-08-25 PROCEDURE — 80053 COMPREHEN METABOLIC PANEL: CPT

## 2023-08-25 PROCEDURE — 80061 LIPID PANEL: CPT

## 2023-08-25 RX ORDER — METFORMIN HYDROCHLORIDE 500 MG/1
500 TABLET, EXTENDED RELEASE ORAL
Qty: 90 TABLET | Refills: 0 | Status: SHIPPED | OUTPATIENT
Start: 2023-08-25 | End: 2023-08-25 | Stop reason: SDUPTHER

## 2023-08-25 RX ORDER — ALPRAZOLAM 0.5 MG/1
0.5 TABLET ORAL AS NEEDED
Qty: 30 TABLET | Refills: 2 | Status: CANCELLED | OUTPATIENT
Start: 2023-08-25

## 2023-08-25 RX ORDER — LOSARTAN POTASSIUM AND HYDROCHLOROTHIAZIDE 25; 100 MG/1; MG/1
1 TABLET ORAL DAILY
Qty: 90 TABLET | Refills: 0 | Status: SHIPPED | OUTPATIENT
Start: 2023-08-25 | End: 2023-08-25 | Stop reason: SDUPTHER

## 2023-08-25 RX ORDER — LOSARTAN POTASSIUM AND HYDROCHLOROTHIAZIDE 25; 100 MG/1; MG/1
1 TABLET ORAL DAILY
Qty: 90 TABLET | Refills: 0 | Status: SHIPPED | OUTPATIENT
Start: 2023-08-25

## 2023-08-25 RX ORDER — ANASTROZOLE 1 MG/1
1 TABLET ORAL DAILY
Qty: 90 TABLET | Refills: 3 | Status: CANCELLED | OUTPATIENT
Start: 2023-08-25

## 2023-08-25 RX ORDER — ALPRAZOLAM 0.5 MG/1
0.5 TABLET ORAL AS NEEDED
Qty: 30 TABLET | Refills: 0 | Status: SHIPPED | OUTPATIENT
Start: 2023-08-25

## 2023-08-25 RX ORDER — METFORMIN HYDROCHLORIDE 500 MG/1
500 TABLET, EXTENDED RELEASE ORAL
Qty: 90 TABLET | Refills: 0 | Status: SHIPPED | OUTPATIENT
Start: 2023-08-25

## 2023-08-25 NOTE — PROGRESS NOTES
Immunization  Immunization performed in left deltoid by Namita Benitez MA. Patient tolerated the procedure well without complications.  08/25/23   Namita Benitez MA

## 2023-08-25 NOTE — PROGRESS NOTES
"Subjective   Mirna Saavedra is a 63 y.o. female.     History of Present Illness   Here for f/u  on chronic conditions: HTN and DM. HTN controlled with Losartan HCTZ. DM controlled with Metformin. No CP or SOA.   The following portions of the patient's history were reviewed and updated as appropriate: allergies, current medications, past family history, past medical history, past social history, past surgical history, and problem list.    Review of Systems   Constitutional:  Negative for fatigue and fever.   Respiratory:  Negative for cough and shortness of breath.    Cardiovascular:  Negative for chest pain and leg swelling.   Gastrointestinal:  Negative for diarrhea, nausea and vomiting.   Psychiatric/Behavioral:  Negative for confusion.    /79   Pulse 102   Temp 97.7 øF (36.5 øC)   Ht 162.6 cm (64\")   Wt 107 kg (236 lb)   SpO2 95%   BMI 40.51 kg/mý     Objective   Physical Exam  Vitals reviewed.   Cardiovascular:      Rate and Rhythm: Normal rate and regular rhythm.      Heart sounds: No murmur heard.  Pulmonary:      Effort: No respiratory distress.      Breath sounds: No wheezing.   Neurological:      Mental Status: She is alert.   Psychiatric:         Behavior: Behavior normal.       Assessment & Plan   Diagnoses and all orders for this visit:    1. Controlled type 2 diabetes mellitus without complication, without long-term current use of insulin (Primary)  -     POC Glycosylated Hemoglobin (Hb A1C)  -     metFORMIN ER (GLUCOPHAGE-XR) 500 MG 24 hr tablet; Take 1 tablet by mouth Daily With Breakfast.  Dispense: 90 tablet; Refill: 0  -     Lipid Panel; Future  A1c 6.7 Continue metformin 500 mg po qd  2. Benign essential hypertension  -     losartan-hydrochlorothiazide (HYZAAR) 100-25 MG per tablet; Take 1 tablet by mouth Daily.  Dispense: 90 tablet; Refill: 0  -     Comprehensive Metabolic Panel; Future  -     Lipid Panel; Future  -     CBC & Differential; Future  -     Microalbumin / Creatinine " Urine Ratio - Urine, Clean Catch  Continue Losartan hctz 100 25 mg po qd  3. Anxiety  -     ALPRAZolam (XANAX) 0.5 MG tablet; Take 1 tablet by mouth As Needed for Anxiety.  Dispense: 30 tablet; Refill: 0  Continue Xanax 0.5 mg po qd prn   4. Colon cancer screening  -     Ambulatory Referral For Screening Colonoscopy    5. Need for vaccination  -     Pneumococcal Conjugate Vaccine 20-Valent (PCV20)

## 2023-08-26 LAB
ALBUMIN SERPL-MCNC: 4.3 G/DL (ref 3.5–5.2)
ALBUMIN/GLOB SERPL: 1.3 G/DL
ALP SERPL-CCNC: 99 U/L (ref 39–117)
ALT SERPL W P-5'-P-CCNC: 34 U/L (ref 1–33)
ANION GAP SERPL CALCULATED.3IONS-SCNC: 13.9 MMOL/L (ref 5–15)
ANISOCYTOSIS BLD QL: ABNORMAL
AST SERPL-CCNC: 50 U/L (ref 1–32)
BASOPHILS # BLD MANUAL: 0.05 10*3/MM3 (ref 0–0.2)
BASOPHILS NFR BLD MANUAL: 1 % (ref 0–1.5)
BILIRUB SERPL-MCNC: 1.2 MG/DL (ref 0–1.2)
BUN SERPL-MCNC: 7 MG/DL (ref 8–23)
BUN/CREAT SERPL: 13.5 (ref 7–25)
CALCIUM SPEC-SCNC: 9.6 MG/DL (ref 8.6–10.5)
CHLORIDE SERPL-SCNC: 96 MMOL/L (ref 98–107)
CO2 SERPL-SCNC: 21.1 MMOL/L (ref 22–29)
CREAT SERPL-MCNC: 0.52 MG/DL (ref 0.57–1)
DEPRECATED RDW RBC AUTO: 54.5 FL (ref 37–54)
EGFRCR SERPLBLD CKD-EPI 2021: 103.3 ML/MIN/1.73
EOSINOPHIL # BLD MANUAL: 0.1 10*3/MM3 (ref 0–0.4)
EOSINOPHIL NFR BLD MANUAL: 2.1 % (ref 0.3–6.2)
ERYTHROCYTE [DISTWIDTH] IN BLOOD BY AUTOMATED COUNT: 20.8 % (ref 12.3–15.4)
GLOBULIN UR ELPH-MCNC: 3.3 GM/DL
GLUCOSE SERPL-MCNC: 157 MG/DL (ref 65–99)
HCT VFR BLD AUTO: 36 % (ref 34–46.6)
HGB BLD-MCNC: 10.9 G/DL (ref 12–15.9)
LYMPHOCYTES # BLD MANUAL: 0.83 10*3/MM3 (ref 0.7–3.1)
LYMPHOCYTES NFR BLD MANUAL: 14.6 % (ref 5–12)
MCH RBC QN AUTO: 22.5 PG (ref 26.6–33)
MCHC RBC AUTO-ENTMCNC: 30.3 G/DL (ref 31.5–35.7)
MCV RBC AUTO: 74.2 FL (ref 79–97)
MONOCYTES # BLD: 0.73 10*3/MM3 (ref 0.1–0.9)
NEUTROPHILS # BLD AUTO: 3.27 10*3/MM3 (ref 1.7–7)
NEUTROPHILS NFR BLD MANUAL: 65.6 % (ref 42.7–76)
PLAT MORPH BLD: NORMAL
PLATELET # BLD AUTO: 145 10*3/MM3 (ref 140–450)
PMV BLD AUTO: ABNORMAL FL
POLYCHROMASIA BLD QL SMEAR: ABNORMAL
POTASSIUM SERPL-SCNC: 4 MMOL/L (ref 3.5–5.2)
PROT SERPL-MCNC: 7.6 G/DL (ref 6–8.5)
RBC # BLD AUTO: 4.85 10*6/MM3 (ref 3.77–5.28)
SODIUM SERPL-SCNC: 131 MMOL/L (ref 136–145)
VARIANT LYMPHS NFR BLD MANUAL: 16.7 % (ref 19.6–45.3)
WBC MORPH BLD: NORMAL
WBC NRBC COR # BLD: 4.98 10*3/MM3 (ref 3.4–10.8)

## 2023-08-28 DIAGNOSIS — D50.8 OTHER IRON DEFICIENCY ANEMIA: Primary | ICD-10-CM

## 2023-09-22 ENCOUNTER — DOCUMENTATION (OUTPATIENT)
Dept: PHYSICAL THERAPY | Facility: HOSPITAL | Age: 63
End: 2023-09-22
Payer: MEDICARE

## 2023-09-22 DIAGNOSIS — C50.411 MALIGNANT NEOPLASM OF UPPER-OUTER QUADRANT OF RIGHT BREAST IN FEMALE, ESTROGEN RECEPTOR POSITIVE: ICD-10-CM

## 2023-09-22 DIAGNOSIS — M79.601 RIGHT ARM PAIN: Primary | ICD-10-CM

## 2023-09-22 DIAGNOSIS — Z17.0 MALIGNANT NEOPLASM OF UPPER-OUTER QUADRANT OF RIGHT BREAST IN FEMALE, ESTROGEN RECEPTOR POSITIVE: ICD-10-CM

## 2023-09-22 NOTE — THERAPY DISCHARGE NOTE
Outpatient Physical Therapy Discharge Summary         Patient Name: Mirna Saavedra  : 1960  MRN: [id number]    Today's Date: 2023    Visit Dx:    ICD-10-CM ICD-9-CM   1. Right arm pain  M79.601 729.5   2. Malignant neoplasm of upper-outer quadrant of right breast in female, estrogen receptor positive  C50.411 174.4    Z17.0 V86.0           OP PT Discharge Summary  Date of Discharge: 23  Outcomes Achieved: Refer to plan of care for updates on goals achieved  Discharge Instructions/Additional Comments: Ms. Saavedra was last seen on 2023.  No further follow-ups scheduled after that time.  Discharging due to lapse in treatment greater than 30days.  HEP had been provided.  Refer to last treatment note and updated progress note for status at d/c.   If further PT indicated in the future, a new referral will be needed.            Donita Up, PT  2023

## 2023-10-03 RX ORDER — BLOOD-GLUCOSE METER
KIT MISCELLANEOUS
Qty: 100 EACH | Refills: 2 | Status: SHIPPED | OUTPATIENT
Start: 2023-10-03

## 2023-10-03 RX ORDER — LANCETS 28 GAUGE
EACH MISCELLANEOUS
Qty: 100 EACH | Refills: 2 | Status: SHIPPED | OUTPATIENT
Start: 2023-10-03

## 2023-10-18 RX ORDER — SODIUM, POTASSIUM,MAG SULFATES 17.5-3.13G
SOLUTION, RECONSTITUTED, ORAL ORAL
Qty: 354 ML | Refills: 0 | Status: SHIPPED | OUTPATIENT
Start: 2023-10-18

## 2023-10-19 ENCOUNTER — OFFICE VISIT (OUTPATIENT)
Dept: ONCOLOGY | Facility: CLINIC | Age: 63
End: 2023-10-19
Payer: MEDICARE

## 2023-10-19 VITALS
OXYGEN SATURATION: 98 % | HEIGHT: 64 IN | DIASTOLIC BLOOD PRESSURE: 84 MMHG | RESPIRATION RATE: 18 BRPM | BODY MASS INDEX: 40.63 KG/M2 | WEIGHT: 238 LBS | TEMPERATURE: 97.1 F | SYSTOLIC BLOOD PRESSURE: 168 MMHG | HEART RATE: 104 BPM

## 2023-10-19 DIAGNOSIS — Z17.0 MALIGNANT NEOPLASM OF UPPER-OUTER QUADRANT OF RIGHT BREAST IN FEMALE, ESTROGEN RECEPTOR POSITIVE: Primary | ICD-10-CM

## 2023-10-19 DIAGNOSIS — C50.411 MALIGNANT NEOPLASM OF UPPER-OUTER QUADRANT OF RIGHT BREAST IN FEMALE, ESTROGEN RECEPTOR POSITIVE: Primary | ICD-10-CM

## 2023-10-19 PROCEDURE — 3077F SYST BP >= 140 MM HG: CPT | Performed by: INTERNAL MEDICINE

## 2023-10-19 PROCEDURE — 1126F AMNT PAIN NOTED NONE PRSNT: CPT | Performed by: INTERNAL MEDICINE

## 2023-10-19 PROCEDURE — 99214 OFFICE O/P EST MOD 30 MIN: CPT | Performed by: INTERNAL MEDICINE

## 2023-10-19 PROCEDURE — 3079F DIAST BP 80-89 MM HG: CPT | Performed by: INTERNAL MEDICINE

## 2023-10-19 NOTE — PROGRESS NOTES
"    PROBLEM LIST:  1. [id number] ER positive (80%), IL+ (70%), HER2 negative (0+) invasive ductal carcinoma of the right breast  A) right breast lumpectomy on 12/2/2022 showed a low-grade invasive ductal carcinoma measuring 1.4 cm.  0 of 3 lymph nodes involved.  oncotype 18.  B) radiotherapy completed 2/24/2023.  Anastrozole started March 2023  2. DM  3. Anxiety  4. GERD  5. HTN    Subjective     CHIEF COMPLAINT: breast cancer    HISTORY OF PRESENT ILLNESS:   Mirna Saavedra returns for follow-up.   She completed radiation to right breast 2/24/2023.  She started anastrozole March 2023.  She says she is doing okay.  She is starting to notice a little bit of pain in the joints of her hands.  She also says that she is struggling some with anxiety about her cancer diagnosis.  She does not dwell on it all the time but sometimes it just comes and hits her.  She is seeing a counselor that she had a previous relationship with and this is helping.    Objective      /81   Pulse 104   Temp 97.1 °F (36.2 °C) (Infrared)   Resp 18   Ht 162.6 cm (64.02\")   Wt 108 kg (238 lb)   SpO2 98%   BMI 40.83 kg/m²    Vitals:    10/19/23 1422   PainSc: 0-No pain             Performance Status:  ECOG score: 0           General: well appearing female in no acute distress  Neuro: alert and oriented  HEENT: sclerae anicteric, oropharynx clear  Lymphatics: No cervical supraclavicular or axillary adenopathy  Cardiovascular: Regular rate and rhythm  Lungs: Clear to auscultation bilaterally  Extremities: no lower extremity edema  Skin: no rashes, lesions, bruising, or petechiae  Psych: mood and affect appropriate        RECENT LABS:  Lab Results   Component Value Date    WBC 4.98 08/25/2023    HGB 10.9 (L) 08/25/2023    HCT 36.0 08/25/2023    MCV 74.2 (L) 08/25/2023     08/25/2023       Lab Results   Component Value Date    GLUCOSE 157 (H) 08/25/2023    BUN 7 (L) 08/25/2023    CREATININE 0.52 (L) 08/25/2023    EGFRIFNONA 95 " 01/24/2022    BCR 13.5 08/25/2023    K 4.0 08/25/2023    CO2 21.1 (L) 08/25/2023    CALCIUM 9.6 08/25/2023    ALBUMIN 4.3 08/25/2023    AST 50 (H) 08/25/2023    ALT 34 (H) 08/25/2023                   ASSESSMENT AND PLAN:     Mirna Saavedra is a 63 y.o. female  with stage IA ER+ Her2 negative IDC of the right breast.      She completed radiation to right breast 2/24/2023.  She started anastrozole March 2023.  She is tolerating the anastrozole well.  We will plan on continuing anastrozole for minimum of 5 years.    She is due for repeat mammogram in February.    Anxiety related to cancer diagnosis.  She has an established relationship with a therapist.    Bone density scan 3/15/2023 was normal.  Continue vitamin D supplement and dietary calcium along with weightbearing exercise such as walking 30 minutes 5 days a week.  We will repeat bone density scan in 2 years.      Follow-up in 6 months.                     Codi Ferrera MD  [facility]ematology [facility]    10/19/2023          CC:

## 2023-10-23 ENCOUNTER — TELEPHONE (OUTPATIENT)
Dept: INTERNAL MEDICINE | Facility: CLINIC | Age: 63
End: 2023-10-23
Payer: MEDICARE

## 2023-10-23 ENCOUNTER — TELEPHONE (OUTPATIENT)
Dept: INTERNAL MEDICINE | Facility: CLINIC | Age: 63
End: 2023-10-23

## 2023-10-23 NOTE — TELEPHONE ENCOUNTER
Caller: Mirna Saavedra    Relationship to patient: Self    Best call back number: [phone]     PATIENT STATES THAT SHE IS SCHEDULED NOVEMBER 3 FOR A COLONOSCOPY AND IS WANTING TO KNOW ANY CHANGES THAT SHE NEEDS TO MAKE IN HER MEDICATION SINCE SHE CANNOT EAT BEFORE THE PROCEDURE.

## 2023-10-23 NOTE — TELEPHONE ENCOUNTER
Caller: Mirna Saavedra    Relationship to patient: Self    Best call back number: [phone]     PATIENT STATES THAT HER INSURANCE WILL NOT PAY FOR FREESTYLE METER, BUT THEY WILL PAY FOR THE ONE TOUCH AND ACUCHEK.  PLEASE CALL IN WHICHEVER ONE THAT DR CASTILLO THINKS IS BEST ALONG WITH THE LANCETS AND STRIPS.  HER PHARMACY IS [facility].

## 2023-10-24 RX ORDER — LANCETS
EACH MISCELLANEOUS
Qty: 102 EACH | Refills: 12 | Status: SHIPPED | OUTPATIENT
Start: 2023-10-24

## 2023-10-24 RX ORDER — BLOOD-GLUCOSE METER
1 KIT MISCELLANEOUS 2 TIMES DAILY
Qty: 1 EACH | Refills: 0 | Status: SHIPPED | OUTPATIENT
Start: 2023-10-24

## 2023-10-24 NOTE — TELEPHONE ENCOUNTER
HUB TO RELAY    Left message for patient letting her know she can take metformin with food after her colonoscopy. Office number given.

## 2023-11-03 ENCOUNTER — OUTSIDE FACILITY SERVICE (OUTPATIENT)
Dept: GASTROENTEROLOGY | Facility: CLINIC | Age: 63
End: 2023-11-03
Payer: MEDICARE

## 2023-11-03 PROCEDURE — 45385 COLONOSCOPY W/LESION REMOVAL: CPT | Performed by: INTERNAL MEDICINE

## 2023-11-03 PROCEDURE — 88305 TISSUE EXAM BY PATHOLOGIST: CPT

## 2023-11-06 ENCOUNTER — LAB REQUISITION (OUTPATIENT)
Dept: LAB | Facility: HOSPITAL | Age: 63
End: 2023-11-06
Payer: MEDICARE

## 2023-11-06 DIAGNOSIS — D50.8 OTHER IRON DEFICIENCY ANEMIAS: ICD-10-CM

## 2023-11-06 DIAGNOSIS — D12.2 BENIGN NEOPLASM OF ASCENDING COLON: ICD-10-CM

## 2023-11-06 DIAGNOSIS — D12.5 BENIGN NEOPLASM OF SIGMOID COLON: ICD-10-CM

## 2023-11-06 DIAGNOSIS — Z12.11 ENCOUNTER FOR SCREENING FOR MALIGNANT NEOPLASM OF COLON: ICD-10-CM

## 2023-11-07 LAB — REF LAB TEST METHOD: NORMAL

## 2023-11-27 ENCOUNTER — OFFICE VISIT (OUTPATIENT)
Dept: INTERNAL MEDICINE | Facility: CLINIC | Age: 63
End: 2023-11-27
Payer: MEDICARE

## 2023-11-27 VITALS
HEART RATE: 103 BPM | HEIGHT: 64 IN | BODY MASS INDEX: 40.15 KG/M2 | WEIGHT: 235.2 LBS | DIASTOLIC BLOOD PRESSURE: 80 MMHG | SYSTOLIC BLOOD PRESSURE: 135 MMHG | OXYGEN SATURATION: 97 %

## 2023-11-27 DIAGNOSIS — I10 BENIGN ESSENTIAL HYPERTENSION: ICD-10-CM

## 2023-11-27 DIAGNOSIS — Z13.29 THYROID DISORDER SCREEN: ICD-10-CM

## 2023-11-27 DIAGNOSIS — C50.411 MALIGNANT NEOPLASM OF UPPER-OUTER QUADRANT OF RIGHT BREAST IN FEMALE, ESTROGEN RECEPTOR POSITIVE: ICD-10-CM

## 2023-11-27 DIAGNOSIS — R05.9 COUGH, UNSPECIFIED TYPE: ICD-10-CM

## 2023-11-27 DIAGNOSIS — E11.9 CONTROLLED TYPE 2 DIABETES MELLITUS WITHOUT COMPLICATION, WITHOUT LONG-TERM CURRENT USE OF INSULIN: ICD-10-CM

## 2023-11-27 DIAGNOSIS — F41.9 ANXIETY: ICD-10-CM

## 2023-11-27 DIAGNOSIS — Z79.899 ENCOUNTER FOR LONG-TERM (CURRENT) USE OF OTHER MEDICATIONS: ICD-10-CM

## 2023-11-27 DIAGNOSIS — Z23 NEED FOR INFLUENZA VACCINATION: ICD-10-CM

## 2023-11-27 DIAGNOSIS — K21.9 GASTROESOPHAGEAL REFLUX DISEASE, UNSPECIFIED WHETHER ESOPHAGITIS PRESENT: ICD-10-CM

## 2023-11-27 DIAGNOSIS — Z00.00 MEDICARE ANNUAL WELLNESS VISIT, SUBSEQUENT: ICD-10-CM

## 2023-11-27 DIAGNOSIS — Z17.0 MALIGNANT NEOPLASM OF UPPER-OUTER QUADRANT OF RIGHT BREAST IN FEMALE, ESTROGEN RECEPTOR POSITIVE: ICD-10-CM

## 2023-11-27 LAB
EXPIRATION DATE: ABNORMAL
HBA1C MFR BLD: 6.7 % (ref 4.5–5.7)
Lab: ABNORMAL

## 2023-11-27 NOTE — PROGRESS NOTES
The ABCs of the Annual Wellness Visit  Subsequent Medicare Wellness Visit    Subjective    Mirna Saavedra is a 63 y.o. female who presents for a Subsequent Medicare Wellness Visit.    The following portions of the patient's history were reviewed and   updated as appropriate: allergies, current medications, past family history, past medical history, past social history, past surgical history, and problem list.    Compared to one year ago, the patient feels her physical   health is the same.    Compared to one year ago, the patient feels her mental   health is the same.    Recent Hospitalizations:  She was not admitted to the hospital during the last year.       Current Medical Providers:  Patient Care Team:  Chiquita Acosta DO as PCP - General  Chiquita Acosta DO as PCP - Family Medicine  Myrna Cummins MD as Referring Physician (General Surgery)  Kayleigh Taveras MD as Consulting Physician (Radiation Oncology)  Isaac Ryan MD as Consulting Physician (Hematology)    Outpatient Medications Prior to Visit   Medication Sig Dispense Refill    ALPRAZolam (XANAX) 0.5 MG tablet Take 1 tablet by mouth As Needed for Anxiety. 30 tablet 0    anastrozole (ARIMIDEX) 1 MG tablet Take 1 tablet by mouth Daily. 90 tablet 3    dexlansoprazole (Dexilant) 60 MG capsule Take 1 capsule by mouth Daily. 90 capsule 0    glucose blood test strip Use as instructed 100 each 12    glucose monitor monitoring kit Use 1 each 2 (Two) Times a Day. (Patient taking differently: Use 1 each 2 (Two) Times a Day. Accu-check) 1 each 0    Lancets (accu-chek multiclix) lancets To test twice a day 102 each 12    losartan-hydrochlorothiazide (HYZAAR) 100-25 MG per tablet Take 1 tablet by mouth Daily. 90 tablet 0    metFORMIN ER (GLUCOPHAGE-XR) 500 MG 24 hr tablet Take 1 tablet by mouth Daily With Breakfast. 90 tablet 0    multivitamin with minerals tablet tablet Take 1 tablet by mouth Daily.      vitamin B-12 (CYANOCOBALAMIN) 1000  "MCG tablet Take 1 tablet by mouth Daily.      vitamin D3 125 MCG (5000 UT) capsule capsule Take 1 capsule by mouth 2 (Two) Times a Week.      mupirocin (BACTROBAN) 2 % ointment       silver sulfadiazine (SILVADENE, SSD) 1 % cream       sodium-potassium-magnesium sulfates (Suprep Bowel Prep Kit) 17.5-3.13-1.6 GM/177ML solution oral solution Use as directed by provider for colonoscopy prep 354 mL 0     No facility-administered medications prior to visit.       No opioid medication identified on active medication list. I have reviewed chart for other potential  high risk medication/s and harmful drug interactions in the elderly.        Aspirin is not on active medication list.  Aspirin use is not indicated based on review of current medical condition/s. Risk of harm outweighs potential benefits.  .    Patient Active Problem List   Diagnosis    Acid reflux    Benign essential hypertension    Elevated liver enzymes    Migraine    Uterine leiomyoma    EMILY positive    Erythrocytosis    High serum ferritin    Controlled type 2 diabetes mellitus without complication, without long-term current use of insulin    Chronic neck pain    Malignant neoplasm of upper-outer quadrant of right breast in female, estrogen receptor positive    Anxiety     Advance Care Planning   Advance Care Planning     Advance Directive is on file.  ACP discussion was held with the patient during this visit. Patient has an advance directive in EMR which is still valid.      Objective    Vitals:    11/27/23 1300 11/27/23 1320   BP: 140/82 135/80   Pulse: 103    SpO2: 97%    Weight: 107 kg (235 lb 3.2 oz)    Height: 162.6 cm (64.02\")    PainSc: 0-No pain      Estimated body mass index is 40.35 kg/m² as calculated from the following:    Height as of this encounter: 162.6 cm (64.02\").    Weight as of this encounter: 107 kg (235 lb 3.2 oz).           Does the patient have evidence of cognitive impairment? No    Lab Results   Component Value Date    HGBA1C 6.7 " (A) 2023        HEALTH RISK ASSESSMENT    Smoking Status:  Social History     Tobacco Use   Smoking Status Former    Packs/day: 1.00    Years: 5.00    Additional pack years: 0.00    Total pack years: 5.00    Types: Cigarettes    Quit date: 1979    Years since quittin.9   Smokeless Tobacco Never     Alcohol Consumption:  Social History     Substance and Sexual Activity   Alcohol Use Yes    Comment: daily glass of wine     Fall Risk Screen:    [place] Fall Risk Assessment was completed, and patient is at HIGH risk for falls. Assessment completed on:2023    Depression Screenin/27/2023     1:09 PM   PHQ-2/PHQ-9 Depression Screening   Little Interest or Pleasure in Doing Things 0-->not at all   Feeling Down, Depressed or Hopeless 0-->not at all   PHQ-9: Brief Depression Severity Measure Score 0       Health Habits and Functional and Cognitive Screenin/27/2023     1:08 PM   Functional & Cognitive Status   Do you have difficulty preparing food and eating? No   Do you have difficulty bathing yourself, getting dressed or grooming yourself? No   Do you have difficulty using the toilet? No   Do you have difficulty moving around from place to place? No   Do you have trouble with steps or getting out of a bed or a chair? No   Current Diet Well Balanced Diet   Dental Exam Not up to date   Eye Exam Up to date   Exercise (times per week) 0 times per week   Current Exercises Include No Regular Exercise   Do you need help using the phone?  No   Are you deaf or do you have serious difficulty hearing?  No   Do you need help to go to places out of walking distance? No   Do you need help shopping? No   Do you need help preparing meals?  No   Do you need help with housework?  No   Do you need help with laundry? No   Do you need help taking your medications? No   Do you need help managing money? No   Do you ever drive or ride in a car without wearing a seat belt? No   Have you felt unusual stress,  anger or loneliness in the last month? No   Who do you live with? Spouse   If you need help, do you have trouble finding someone available to you? No   Have you been bothered in the last four weeks by sexual problems? No   Do you have difficulty concentrating, remembering or making decisions? No       Age-appropriate Screening Schedule:  Refer to the list below for future screening recommendations based on patient's age, sex and/or medical conditions. Orders for these recommended tests are listed in the plan section. The patient has been provided with a written plan.    Health Maintenance   Topic Date Due    URINE MICROALBUMIN  Never done    TDAP/TD VACCINES (1 - Tdap) Never done    ZOSTER VACCINE (1 of 2) Never done    COVID-19 Vaccine (4 - 2023-24 season) 09/01/2023    DIABETIC EYE EXAM  12/26/2023 (Originally 5/9/2016)    BMI FOLLOWUP  02/01/2024    HEMOGLOBIN A1C  05/27/2024    MAMMOGRAM  08/24/2024    DIABETIC FOOT EXAM  08/25/2024    ANNUAL WELLNESS VISIT  11/27/2024    DXA SCAN  03/15/2025    COLORECTAL CANCER SCREENING  11/03/2026    HEPATITIS C SCREENING  Completed    INFLUENZA VACCINE  Completed    Pneumococcal Vaccine 65+  Completed    PAP SMEAR  Discontinued                  CMS Preventative Services Quick Reference  Risk Factors Identified During Encounter  Fall Risk-High or Moderate: Discussed Fall Prevention in the home  Immunizations Discussed/Encouraged: Influenza  The above risks/problems have been discussed with the patient.  Pertinent information has been shared with the patient in the After Visit Summary.  An After Visit Summary and PPPS were made available to the patient.    Follow Up:   Next Medicare Wellness visit to be scheduled in 1 year.       Additional E&M Note during same encounter follows:  Patient has multiple medical problems which are significant and separately identifiable that require additional work above and beyond the Medicare Wellness Visit.      Chief Complaint  Medicare  "Wellness-subsequent, Hypertension, Heartburn, Anxiety, and Diabetes    Subjective        HPI  Mirna Saavedra is also being seen today for HTN, GERD, anxiety, DM, and breast cancer. HTN controlled with Losartan HCTZ. GERD controlled with Dexilant. Anxiety controlled with Xanax. Breast cancer stable with Arimidex. DM controlled with Metformin.     Review of Systems   Constitutional:  Negative for fatigue and fever.   Respiratory:  Negative for cough and shortness of breath.    Cardiovascular:  Negative for chest pain and leg swelling.   Gastrointestinal:  Negative for abdominal pain.   Neurological:  Negative for weakness and confusion.       Objective   Vital Signs:  /80   Pulse 103   Ht 162.6 cm (64.02\")   Wt 107 kg (235 lb 3.2 oz)   SpO2 97%   BMI 40.35 kg/m²     Physical Exam  Vitals reviewed.   Cardiovascular:      Rate and Rhythm: Normal rate and regular rhythm.   Pulmonary:      Effort: No respiratory distress.      Breath sounds: No wheezing.   Neurological:      General: No focal deficit present.      Mental Status: She is alert.   Psychiatric:         Behavior: Behavior normal.                         Assessment and Plan   Diagnoses and all orders for this visit:    1. Medicare annual wellness visit, subsequent  Done today  2. Controlled type 2 diabetes mellitus without complication, without long-term current use of insulin  -     POC Glycosylated Hemoglobin (Hb A1C)  A1c 6.7. continue Metformin 500 mg po qd  3. Encounter for long-term (current) use of other medications  -     Cancel: Compliance Drug Analysis, Ur - Urine, Clean Catch; Future    4. Benign essential hypertension  -     CBC & Differential; Future  -     Comprehensive Metabolic Panel; Future  Continue Losartan HCTZ 100/25 mg po qd  5. Gastroesophageal reflux disease, unspecified whether esophagitis present  Continue Dexilant  6. Malignant neoplasm of upper-outer quadrant of right breast in female, estrogen receptor positive  -     " XR Chest PA & Lateral; Future  Continue Arimidex  7. Anxiety  Continue Xanax0.5 mg po qd pen  8. Thyroid disorder screen  -     TSH; Future    9. Cough, unspecified type  -     XR Chest PA & Lateral; Future    10. Need for influenza vaccination  -     Fluzone High-Dose 65+yrs             Follow Up   Return in about 3 months (around 2/27/2024).  Patient was given instructions and counseling regarding her condition or for health maintenance advice. Please see specific information pulled into the AVS if appropriate.

## 2024-02-21 DIAGNOSIS — Z17.0 MALIGNANT NEOPLASM OF UPPER-OUTER QUADRANT OF RIGHT BREAST IN FEMALE, ESTROGEN RECEPTOR POSITIVE: Primary | ICD-10-CM

## 2024-02-21 DIAGNOSIS — E11.9 CONTROLLED TYPE 2 DIABETES MELLITUS WITHOUT COMPLICATION, WITHOUT LONG-TERM CURRENT USE OF INSULIN: ICD-10-CM

## 2024-02-21 DIAGNOSIS — I10 BENIGN ESSENTIAL HYPERTENSION: ICD-10-CM

## 2024-02-21 DIAGNOSIS — C50.411 MALIGNANT NEOPLASM OF UPPER-OUTER QUADRANT OF RIGHT BREAST IN FEMALE, ESTROGEN RECEPTOR POSITIVE: Primary | ICD-10-CM

## 2024-02-21 RX ORDER — METFORMIN HYDROCHLORIDE 500 MG/1
500 TABLET, EXTENDED RELEASE ORAL
Qty: 90 TABLET | Refills: 0 | Status: SHIPPED | OUTPATIENT
Start: 2024-02-21

## 2024-02-21 RX ORDER — LOSARTAN POTASSIUM AND HYDROCHLOROTHIAZIDE 25; 100 MG/1; MG/1
1 TABLET ORAL DAILY
Qty: 90 TABLET | Refills: 0 | Status: SHIPPED | OUTPATIENT
Start: 2024-02-21

## 2024-02-21 RX ORDER — ANASTROZOLE 1 MG/1
1 TABLET ORAL DAILY
Qty: 90 TABLET | Refills: 3 | Status: SHIPPED | OUTPATIENT
Start: 2024-02-21

## 2024-03-01 NOTE — TELEPHONE ENCOUNTER
Addended by: IDALIA GRANADOS on: 3/1/2024 02:51 PM     Modules accepted: Orders     Patient notified of surgical consult appointment with Dr. Cummins on 9/15/22 @ 1015. Patient given office contact & location information. Told to bring photo ID, list of prescription & OTC medications, insurance information. Must wear a mask. Encouraged pt to call back or contact surgeon's office with further questions. Patient verbalized understanding.

## 2024-04-03 ENCOUNTER — HOSPITAL ENCOUNTER (OUTPATIENT)
Dept: MAMMOGRAPHY | Facility: HOSPITAL | Age: 64
Discharge: HOME | End: 2024-04-03
Admitting: RADIOLOGY
Payer: MEDICARE

## 2024-04-03 DIAGNOSIS — R92.8 ABNORMAL MAMMOGRAM: ICD-10-CM

## 2024-04-03 PROCEDURE — 77065 DX MAMMO INCL CAD UNI: CPT | Performed by: RADIOLOGY

## 2024-04-03 PROCEDURE — G0279 TOMOSYNTHESIS, MAMMO: HCPCS

## 2024-04-03 PROCEDURE — G0279 TOMOSYNTHESIS, MAMMO: HCPCS | Performed by: RADIOLOGY

## 2024-04-03 PROCEDURE — 77065 DX MAMMO INCL CAD UNI: CPT

## 2024-04-05 ENCOUNTER — TELEMEDICINE (OUTPATIENT)
Dept: SLEEP MEDICINE | Facility: CLINIC | Age: 64
End: 2024-04-05
Payer: MEDICARE

## 2024-04-05 VITALS — HEIGHT: 65 IN | WEIGHT: 230 LBS | BODY MASS INDEX: 38.32 KG/M2

## 2024-04-05 DIAGNOSIS — G47.33 OSA (OBSTRUCTIVE SLEEP APNEA): Primary | ICD-10-CM

## 2024-04-05 PROCEDURE — 1160F RVW MEDS BY RX/DR IN RCRD: CPT | Performed by: NURSE PRACTITIONER

## 2024-04-05 PROCEDURE — 99213 OFFICE O/P EST LOW 20 MIN: CPT | Performed by: NURSE PRACTITIONER

## 2024-04-05 PROCEDURE — 1159F MED LIST DOCD IN RCRD: CPT | Performed by: NURSE PRACTITIONER

## 2024-04-05 NOTE — PROGRESS NOTES
Chief Complaint:   Chief Complaint   Patient presents with    Follow-up       HPI:    Mirna Saavedra is a 63 y.o. female here for follow-up of apnea.  Patient was last seen 4/7/2023.  Patient continues to do well with CPAP therapy.  Patient is sleeping 7 to 8 hours nightly and feels rested upon awakening.  Patient goes to sleep quickly and will get up x 1 in the night.  Patient has an [place] score of 4/24.  Patient continues to do well with nasal mask and heated tubing.  Patient has no concerns or complaints regarding CPAP and wishes to continue therapy.        Current medications are:   Current Outpatient Medications:     losartan-hydrochlorothiazide (HYZAAR) 100-25 MG per tablet, TAKE 1 TABLET BY MOUTH DAILY, Disp: 90 tablet, Rfl: 0    metFORMIN ER (GLUCOPHAGE-XR) 500 MG 24 hr tablet, TAKE 1 TABLET BY MOUTH DAILY WITH BREAKFAST, Disp: 90 tablet, Rfl: 0    ALPRAZolam (XANAX) 0.5 MG tablet, Take 1 tablet by mouth As Needed for Anxiety., Disp: 30 tablet, Rfl: 0    anastrozole (ARIMIDEX) 1 MG tablet, TAKE ONE TABLET BY MOUTH DAILY, Disp: 90 tablet, Rfl: 3    dexlansoprazole (Dexilant) 60 MG capsule, Take 1 capsule by mouth Daily., Disp: 90 capsule, Rfl: 0    glucose blood test strip, Use as instructed, Disp: 100 each, Rfl: 12    glucose monitor monitoring kit, Use 1 each 2 (Two) Times a Day. (Patient taking differently: Use 1 each 2 (Two) Times a Day. Accu-check), Disp: 1 each, Rfl: 0    Lancets (accu-chek multiclix) lancets, To test twice a day, Disp: 102 each, Rfl: 12    vitamin B-12 (CYANOCOBALAMIN) 1000 MCG tablet, Take 1 tablet by mouth Daily., Disp: , Rfl:     vitamin D3 125 MCG (5000 UT) capsule capsule, Take 1 capsule by mouth 2 (Two) Times a Week., Disp: , Rfl: .      The patient's relevant past medical, surgical, family and social history were reviewed and updated in Epic as appropriate.       Review of Systems   Eyes:  Positive for visual disturbance.   Respiratory:  Positive for apnea.     Musculoskeletal:  Positive for neck pain.   Allergic/Immunologic: Positive for environmental allergies.   Neurological:  Positive for headaches.   Psychiatric/Behavioral:  Positive for sleep disturbance. The patient is nervous/anxious.    All other systems reviewed and are negative.        Objective:    Physical Exam  Pulmonary:      Effort: Pulmonary effort is normal. No respiratory distress.   Neurological:      Mental Status: She is oriented to person, place, and time.   Psychiatric:         Mood and Affect: Mood normal.         Behavior: Behavior normal.         Thought Content: Thought content normal.         Judgment: Judgment normal.         CPAP Report [facility] at home    90/90 days of use  Greater than 4-hour use 100%  90% pressure 12.3  AHI 0.8  Settings 10-18  The patient continues to use and benefit from CPAP therapy.    ASSESSMENT/PLAN    Diagnoses and all orders for this visit:    1. [place] (obstructive sleep apnea) (Primary)  -     PAP Therapy        Counseled patient regarding multimodal approach with healthy nutrition, healthy sleep, regular physical activity, social activities, counseling, and medications. Encouraged to practice lateral sleep position. Avoid alcohol and sedatives close to bedtime.    Refill supplies x 1 year.  Return to clinic 1 year or sooner as symptoms warrant.  The patient is located in [facility] at home. The patient presents today for telehealth service.  This service was conducted via audio/video technology through a secure [employer] video visit connection through Epic.  This provider is located in [facility].  Patient stated they are in a secure environment for the session.  Patient's condition being diagnosed/treated is appropriate for telemedicine.  The provider identified himself as well as his credentials.  The patient, and/or patient's guardian, consent to be seen remotely, and when consent is given they understanding that the consent allows for  patient identifiable information to be sent to a third-party as needed.  They may refuse to be seen remotely at any time.  The electronic data is encrypted and password protected, and the patient and/or guardian has been advised of the potential risk to privacy not withstanding such measures.  Patient identifiers used: Name and date of birth.   I have reviewed the results of my evaluation and impression and discussed my recommendations in detail with the patient.      Signed by  GREG Tuttle    April 5, 2024      CC: Chiquita Acosta DO         No ref. provider found

## 2024-05-08 ENCOUNTER — TELEPHONE (OUTPATIENT)
Dept: ONCOLOGY | Facility: CLINIC | Age: 64
End: 2024-05-08
Payer: MEDICARE

## 2024-05-21 DIAGNOSIS — E11.9 CONTROLLED TYPE 2 DIABETES MELLITUS WITHOUT COMPLICATION, WITHOUT LONG-TERM CURRENT USE OF INSULIN: ICD-10-CM

## 2024-05-21 DIAGNOSIS — I10 BENIGN ESSENTIAL HYPERTENSION: ICD-10-CM

## 2024-05-21 RX ORDER — METFORMIN HYDROCHLORIDE 500 MG/1
500 TABLET, EXTENDED RELEASE ORAL
Qty: 90 TABLET | Refills: 2 | Status: SHIPPED | OUTPATIENT
Start: 2024-05-21

## 2024-05-21 RX ORDER — LOSARTAN POTASSIUM AND HYDROCHLOROTHIAZIDE 25; 100 MG/1; MG/1
1 TABLET ORAL DAILY
Qty: 90 TABLET | Refills: 2 | Status: SHIPPED | OUTPATIENT
Start: 2024-05-21

## 2024-05-30 ENCOUNTER — OFFICE VISIT (OUTPATIENT)
Dept: ONCOLOGY | Facility: CLINIC | Age: 64
End: 2024-05-30
Payer: MEDICARE

## 2024-05-30 VITALS
OXYGEN SATURATION: 97 % | RESPIRATION RATE: 18 BRPM | DIASTOLIC BLOOD PRESSURE: 82 MMHG | BODY MASS INDEX: 38.82 KG/M2 | SYSTOLIC BLOOD PRESSURE: 185 MMHG | WEIGHT: 233 LBS | TEMPERATURE: 97.8 F | HEART RATE: 78 BPM | HEIGHT: 65 IN

## 2024-05-30 DIAGNOSIS — Z17.0 MALIGNANT NEOPLASM OF UPPER-OUTER QUADRANT OF RIGHT BREAST IN FEMALE, ESTROGEN RECEPTOR POSITIVE: Primary | ICD-10-CM

## 2024-05-30 DIAGNOSIS — C50.411 MALIGNANT NEOPLASM OF UPPER-OUTER QUADRANT OF RIGHT BREAST IN FEMALE, ESTROGEN RECEPTOR POSITIVE: Primary | ICD-10-CM

## 2024-05-30 PROCEDURE — 1126F AMNT PAIN NOTED NONE PRSNT: CPT | Performed by: INTERNAL MEDICINE

## 2024-05-30 PROCEDURE — 3077F SYST BP >= 140 MM HG: CPT | Performed by: INTERNAL MEDICINE

## 2024-05-30 PROCEDURE — 99214 OFFICE O/P EST MOD 30 MIN: CPT | Performed by: INTERNAL MEDICINE

## 2024-05-30 PROCEDURE — 3079F DIAST BP 80-89 MM HG: CPT | Performed by: INTERNAL MEDICINE

## 2024-05-30 NOTE — PROGRESS NOTES
"    PROBLEM LIST:  1. [id number] ER positive (80%), KS+ (70%), HER2 negative (0+) invasive ductal carcinoma of the right breast  A) right breast lumpectomy on 12/2/2022 showed a low-grade invasive ductal carcinoma measuring 1.4 cm.  0 of 3 lymph nodes involved.  oncotype 18.  B) radiotherapy completed 2/24/2023.  Anastrozole started March 2023  2. DM  3. Anxiety  4. GERD  5. HTN    Subjective     CHIEF COMPLAINT: breast cancer    HISTORY OF PRESENT ILLNESS:   Mirna Saavedra returns for follow-up.   She completed radiation to right breast 2/24/2023.  She started anastrozole March 2023.      She is doing okay.  She still has some sensitivity and tenderness in the breasts.  She has had hair loss since starting the anastrozole and wants to know what options she has for managing this.    Objective      BP (!) 185/82 Comment: LUE  Pulse 78   Temp 97.8 °F (36.6 °C)   Resp 18   Ht 165.1 cm (65\")   Wt 106 kg (233 lb)   SpO2 97%   BMI 38.77 kg/m²    Vitals:    05/30/24 1438   PainSc: 0-No pain             Performance Status:  ECOG score: 0           General: well appearing female in no acute distress  Neuro: alert and oriented  HEENT: sclerae anicteric, oropharynx clear  Lymphatics: No cervical supraclavicular or axillary adenopathy  Breast: Bilateral breast exam performed.  Well-healed incisions on the right.  No palpable masses bilaterally  Cardiovascular: Regular rate and rhythm  Lungs: Clear to auscultation bilaterally  Extremities: no lower extremity edema  Skin: no rashes, lesions, bruising, or petechiae  Psych: mood and affect appropriate        RECENT LABS:  Lab Results   Component Value Date    WBC 4.98 08/25/2023    HGB 10.9 (L) 08/25/2023    HCT 36.0 08/25/2023    MCV 74.2 (L) 08/25/2023     08/25/2023       Lab Results   Component Value Date    GLUCOSE 157 (H) 08/25/2023    BUN 7 (L) 08/25/2023    CREATININE 0.52 (L) 08/25/2023    EGFRIFNONA 95 01/24/2022    BCR 13.5 08/25/2023    K 4.0 08/25/2023 "    CO2 21.1 (L) 08/25/2023    CALCIUM 9.6 08/25/2023    ALBUMIN 4.3 08/25/2023    AST 50 (H) 08/25/2023    ALT 34 (H) 08/25/2023                   ASSESSMENT AND PLAN:     Mirna Saavedra is a 63 y.o. female  with stage IA ER+ Her2 negative IDC of the right breast.      She completed radiation to right breast 2/24/2023.  She started anastrozole March 2023.  She is tolerating the anastrozole well.  We will plan on continuing anastrozole for minimum of 5 years.    She is scheduled for repeat diagnostic mammogram on the right in November.    Hair loss: Recommended that she try minoxidil.  I am not aware of any evidence to support vitamin supplementation for this.    Bone density scan 3/15/2023 was normal.  Continue vitamin D supplement and dietary calcium along with weightbearing exercise. We will repeat bone density scan in 2 years.    Follow-up in 6 months.                     Codi eFrrera MD  [facility]    5/30/2024          CC:

## 2024-10-22 LAB — NCCN CRITERIA FLAG: ABNORMAL

## 2024-10-23 NOTE — PROGRESS NOTES
This patient recently took the CARE risk assessment as part of their mammogram appointment. Based on the patient's responses, NCCN criteria for genetic testing was met.     Navigator follow-up:   The patient had genetic counseling and genetic testing in 2022. The CancerNext panel ordered through [employer] which analyzes 36 genes that are associated with increased risk for cancer. Genetic testing was negative for known deleterious/pathogenic mutations in the 36 on this panel. No additional testing is indicated at this time.

## 2024-11-06 ENCOUNTER — HOSPITAL ENCOUNTER (OUTPATIENT)
Dept: MAMMOGRAPHY | Facility: HOSPITAL | Age: 64
Discharge: HOME | End: 2024-11-06
Admitting: RADIOLOGY
Payer: MEDICARE

## 2024-11-06 DIAGNOSIS — R92.8 ABNORMAL MAMMOGRAM: ICD-10-CM

## 2024-11-06 PROCEDURE — G0279 TOMOSYNTHESIS, MAMMO: HCPCS | Performed by: RADIOLOGY

## 2024-11-06 PROCEDURE — G0279 TOMOSYNTHESIS, MAMMO: HCPCS

## 2024-11-06 PROCEDURE — 77066 DX MAMMO INCL CAD BI: CPT | Performed by: RADIOLOGY

## 2024-11-06 PROCEDURE — 77066 DX MAMMO INCL CAD BI: CPT

## 2024-11-07 NOTE — TELEPHONE ENCOUNTER
Brief Postoperative Note      Patient: Henry Jerome  YOB: 1965  MRN: [id number]    Date of Procedure: 11/7/2024    Pre-Op Diagnosis Codes:      * Umbilical hernia [K42.9]    Post-Op Diagnosis: Same       Procedure(s):  HERNIA UMBILICAL REPAIR    Surgeon(s):  Michael Herrera MD    Assistant:  Surgical Assistant: Armand Donaldson    Anesthesia: Monitor Anesthesia Care    Estimated Blood Loss (mL): less than 50     Complications: None    Specimens:   * No specimens in log *    Implants:  * No implants in log *      Drains: * No LDAs found *    Findings:  Infection Present At Time Of Surgery (PATOS) (choose all levels that have infection present):  No infection present  Other Findings: as above    Electronically signed by MICHAEL HERRERA MD on 11/7/2024 at 12:21 PM   Spoke with patient  regarding labs.  Patient has not completed Cologuard yet. Will come back in 3 weeks for repeat labs.

## 2024-11-10 RX ORDER — LANCETS
EACH MISCELLANEOUS
Qty: 100 EACH | Refills: 0 | Status: SHIPPED | OUTPATIENT
Start: 2024-11-10

## 2024-11-10 RX ORDER — BLOOD SUGAR DIAGNOSTIC
1 STRIP MISCELLANEOUS 2 TIMES DAILY
Qty: 100 EACH | Refills: 0 | Status: SHIPPED | OUTPATIENT
Start: 2024-11-10

## 2024-12-04 ENCOUNTER — LAB (OUTPATIENT)
Dept: LAB | Facility: HOSPITAL | Age: 64
End: 2024-12-04
Payer: MEDICARE

## 2024-12-04 ENCOUNTER — OFFICE VISIT (OUTPATIENT)
Dept: INTERNAL MEDICINE | Facility: CLINIC | Age: 64
End: 2024-12-04
Payer: MEDICARE

## 2024-12-04 VITALS
OXYGEN SATURATION: 98 % | BODY MASS INDEX: 37.72 KG/M2 | DIASTOLIC BLOOD PRESSURE: 84 MMHG | WEIGHT: 226.4 LBS | SYSTOLIC BLOOD PRESSURE: 138 MMHG | HEART RATE: 78 BPM | RESPIRATION RATE: 20 BRPM | HEIGHT: 65 IN | TEMPERATURE: 98.1 F

## 2024-12-04 DIAGNOSIS — Z13.29 THYROID DISORDER SCREEN: ICD-10-CM

## 2024-12-04 DIAGNOSIS — C50.411 MALIGNANT NEOPLASM OF UPPER-OUTER QUADRANT OF RIGHT BREAST IN FEMALE, ESTROGEN RECEPTOR POSITIVE: ICD-10-CM

## 2024-12-04 DIAGNOSIS — Z00.00 MEDICARE ANNUAL WELLNESS VISIT, SUBSEQUENT: ICD-10-CM

## 2024-12-04 DIAGNOSIS — I10 BENIGN ESSENTIAL HYPERTENSION: Primary | ICD-10-CM

## 2024-12-04 DIAGNOSIS — E11.9 CONTROLLED TYPE 2 DIABETES MELLITUS WITHOUT COMPLICATION, WITHOUT LONG-TERM CURRENT USE OF INSULIN: ICD-10-CM

## 2024-12-04 DIAGNOSIS — Z17.0 MALIGNANT NEOPLASM OF UPPER-OUTER QUADRANT OF RIGHT BREAST IN FEMALE, ESTROGEN RECEPTOR POSITIVE: ICD-10-CM

## 2024-12-04 DIAGNOSIS — Z13.220 SCREENING CHOLESTEROL LEVEL: ICD-10-CM

## 2024-12-04 DIAGNOSIS — I10 BENIGN ESSENTIAL HYPERTENSION: ICD-10-CM

## 2024-12-04 DIAGNOSIS — F41.9 ANXIETY: ICD-10-CM

## 2024-12-04 LAB
BASOPHILS # BLD AUTO: 0.03 10*3/MM3 (ref 0–0.2)
BASOPHILS NFR BLD AUTO: 0.7 % (ref 0–1.5)
CHOLEST SERPL-MCNC: 179 MG/DL (ref 0–200)
DEPRECATED RDW RBC AUTO: 48.3 FL (ref 37–54)
EOSINOPHIL # BLD AUTO: 0.04 10*3/MM3 (ref 0–0.4)
EOSINOPHIL NFR BLD AUTO: 0.9 % (ref 0.3–6.2)
ERYTHROCYTE [DISTWIDTH] IN BLOOD BY AUTOMATED COUNT: 13.4 % (ref 12.3–15.4)
EXPIRATION DATE: ABNORMAL
HBA1C MFR BLD: 7 % (ref 4.5–5.7)
HCT VFR BLD AUTO: 42.9 % (ref 34–46.6)
HDLC SERPL-MCNC: 74 MG/DL (ref 40–60)
HGB BLD-MCNC: 15.1 G/DL (ref 12–15.9)
IMM GRANULOCYTES # BLD AUTO: 0.02 10*3/MM3 (ref 0–0.05)
IMM GRANULOCYTES NFR BLD AUTO: 0.5 % (ref 0–0.5)
LDLC SERPL CALC-MCNC: 87 MG/DL (ref 0–100)
LDLC/HDLC SERPL: 1.14 {RATIO}
LYMPHOCYTES # BLD AUTO: 0.97 10*3/MM3 (ref 0.7–3.1)
LYMPHOCYTES NFR BLD AUTO: 22.4 % (ref 19.6–45.3)
Lab: ABNORMAL
MCH RBC QN AUTO: 35 PG (ref 26.6–33)
MCHC RBC AUTO-ENTMCNC: 35.2 G/DL (ref 31.5–35.7)
MCV RBC AUTO: 99.3 FL (ref 79–97)
MONOCYTES # BLD AUTO: 0.38 10*3/MM3 (ref 0.1–0.9)
MONOCYTES NFR BLD AUTO: 8.8 % (ref 5–12)
NEUTROPHILS NFR BLD AUTO: 2.89 10*3/MM3 (ref 1.7–7)
NEUTROPHILS NFR BLD AUTO: 66.7 % (ref 42.7–76)
PLATELET # BLD AUTO: 128 10*3/MM3 (ref 140–450)
PMV BLD AUTO: 13.1 FL (ref 6–12)
RBC # BLD AUTO: 4.32 10*6/MM3 (ref 3.77–5.28)
TRIGL SERPL-MCNC: 104 MG/DL (ref 0–150)
TSH SERPL DL<=0.05 MIU/L-ACNC: 2.51 UIU/ML (ref 0.27–4.2)
VLDLC SERPL-MCNC: 18 MG/DL (ref 5–40)
WBC NRBC COR # BLD AUTO: 4.33 10*3/MM3 (ref 3.4–10.8)

## 2024-12-04 PROCEDURE — 1170F FXNL STATUS ASSESSED: CPT | Performed by: INTERNAL MEDICINE

## 2024-12-04 PROCEDURE — 3075F SYST BP GE 130 - 139MM HG: CPT | Performed by: INTERNAL MEDICINE

## 2024-12-04 PROCEDURE — 83036 HEMOGLOBIN GLYCOSYLATED A1C: CPT | Performed by: INTERNAL MEDICINE

## 2024-12-04 PROCEDURE — 3079F DIAST BP 80-89 MM HG: CPT | Performed by: INTERNAL MEDICINE

## 2024-12-04 PROCEDURE — 1126F AMNT PAIN NOTED NONE PRSNT: CPT | Performed by: INTERNAL MEDICINE

## 2024-12-04 PROCEDURE — 80061 LIPID PANEL: CPT

## 2024-12-04 PROCEDURE — 85025 COMPLETE CBC W/AUTO DIFF WBC: CPT

## 2024-12-04 PROCEDURE — 99214 OFFICE O/P EST MOD 30 MIN: CPT | Performed by: INTERNAL MEDICINE

## 2024-12-04 PROCEDURE — 80053 COMPREHEN METABOLIC PANEL: CPT

## 2024-12-04 PROCEDURE — 90662 IIV NO PRSV INCREASED AG IM: CPT | Performed by: INTERNAL MEDICINE

## 2024-12-04 PROCEDURE — 84443 ASSAY THYROID STIM HORMONE: CPT

## 2024-12-04 PROCEDURE — 3051F HG A1C>EQUAL 7.0%<8.0%: CPT | Performed by: INTERNAL MEDICINE

## 2024-12-04 PROCEDURE — G0008 ADMIN INFLUENZA VIRUS VAC: HCPCS | Performed by: INTERNAL MEDICINE

## 2024-12-04 PROCEDURE — G0439 PPPS, SUBSEQ VISIT: HCPCS | Performed by: INTERNAL MEDICINE

## 2024-12-04 RX ORDER — METFORMIN HYDROCHLORIDE 500 MG/1
TABLET, EXTENDED RELEASE ORAL
Qty: 180 TABLET | Refills: 1 | Status: SHIPPED | OUTPATIENT
Start: 2024-12-04

## 2024-12-04 NOTE — PROGRESS NOTES
Subjective   The ABCs of the Annual Wellness Visit  Medicare Wellness Visit      Mirna Saavedra is a 64 y.o. patient who presents for a Medicare Wellness Visit.    The following portions of the patient's history were reviewed and   updated as appropriate: allergies, current medications, past family history, past medical history, past social history, past surgical history, and problem list.    Compared to one year ago, the patient's physical   health is the same.  Compared to one year ago, the patient's mental   health is the same.    Recent Hospitalizations:  She was not admitted to the hospital during the last year.     Current Medical Providers:  Patient Care Team:  Chiquita Acosta DO as PCP - General  Chiquita Acosta DO as PCP - Family Medicine  Myrna Cummins MD as Referring Physician (General Surgery)  Kayleigh Taveras MD as Consulting Physician (Radiation Oncology)  Isaac Ryan MD as Consulting Physician (Hematology)    Outpatient Medications Prior to Visit   Medication Sig Dispense Refill    Accu-Chek Softclix Lancets lancets USE TO TEST TWO TIMES A  each 0    ALPRAZolam (XANAX) 0.5 MG tablet Take 1 tablet by mouth As Needed for Anxiety. 30 tablet 0    anastrozole (ARIMIDEX) 1 MG tablet TAKE ONE TABLET BY MOUTH DAILY 90 tablet 3    dexlansoprazole (Dexilant) 60 MG capsule Take 1 capsule by mouth Daily. 90 capsule 0    glucose blood (Accu-Chek Guide) test strip USE TO CHECK BLOOD SUGAR TWICE DAILY 100 each 0    glucose monitor monitoring kit Use 1 each 2 (Two) Times a Day. (Patient taking differently: Use 1 each 2 (Two) Times a Day. Accu-check) 1 each 0    losartan-hydrochlorothiazide (HYZAAR) 100-25 MG per tablet TAKE 1 TABLET BY MOUTH DAILY 90 tablet 2    metFORMIN ER (GLUCOPHAGE-XR) 500 MG 24 hr tablet TAKE 1 TABLET BY MOUTH DAILY WITH BREAKFAST 90 tablet 2    vitamin B-12 (CYANOCOBALAMIN) 1000 MCG tablet Take 1 tablet by mouth Daily.      vitamin D3 125 MCG (5000 UT)  "capsule capsule Take 1 capsule by mouth 2 (Two) Times a Week.       No facility-administered medications prior to visit.     No opioid medication identified on active medication list. I have reviewed chart for other potential  high risk medication/s and harmful drug interactions in the elderly.      Aspirin is not on active medication list.  Aspirin use is not indicated based on review of current medical condition/s. Risk of harm outweighs potential benefits.  .    Patient Active Problem List   Diagnosis    Acid reflux    Benign essential hypertension    Elevated liver enzymes    Migraine    Uterine leiomyoma    EMILY positive    Erythrocytosis    High serum ferritin    Controlled type 2 diabetes mellitus without complication, without long-term current use of insulin    Chronic neck pain    Malignant neoplasm of upper-outer quadrant of right breast in female, estrogen receptor positive    Anxiety     Advance Care Planning Advance Directive is on file.  ACP discussion was held with the patient during this visit. Patient has an advance directive in EMR which is still valid.             Objective   Vitals:    12/04/24 1301   BP: 138/84   Pulse: 78   Resp: 20   Temp: 98.1 °F (36.7 °C)   SpO2: 98%   Weight: 103 kg (226 lb 6.4 oz)   Height: 165.1 cm (65\")   PainSc: 0-No pain       Estimated body mass index is 37.67 kg/m² as calculated from the following:    Height as of this encounter: 165.1 cm (65\").    Weight as of this encounter: 103 kg (226 lb 6.4 oz).            Does the patient have evidence of cognitive impairment? No  Lab Results   Component Value Date    HGBA1C 7.0 (A) 12/04/2024                                                                                                Health  Risk Assessment    Smoking Status:  Social History     Tobacco Use   Smoking Status Former    Current packs/day: 0.00    Average packs/day: 1 pack/day for 5.0 years (5.0 ttl pk-yrs)    Types: Cigarettes    Start date: 1/1/1974    Quit " date: 1979    Years since quittin.9   Smokeless Tobacco Never     Alcohol Consumption:  Social History     Substance and Sexual Activity   Alcohol Use Yes    Comment: daily glass of wine       Fall Risk Screen  RAINADI Fall Risk Assessment was completed, and patient is at LOW risk for falls.Assessment completed on:2024    Depression Screening   Little interest or pleasure in doing things? Not at all   Feeling down, depressed, or hopeless? Not at all   PHQ-2 Total Score 0      Health Habits and Functional and Cognitive Screenin/4/2024     1:05 PM   Functional & Cognitive Status   Do you have difficulty preparing food and eating? No   Do you have difficulty bathing yourself, getting dressed or grooming yourself? No   Do you have difficulty using the toilet? No   Do you have difficulty moving around from place to place? No   Do you have trouble with steps or getting out of a bed or a chair? No   Current Diet Low Carb Diet   Dental Exam Not up to date   Eye Exam Not up to date   Exercise (times per week) 2 times per week   Current Exercises Include Walking   Do you need help using the phone?  No   Are you deaf or do you have serious difficulty hearing?  No   Do you need help to go to places out of walking distance? No   Do you need help shopping? No   Do you need help preparing meals?  No   Do you need help with housework?  No   Do you need help with laundry? No   Do you need help taking your medications? No   Do you need help managing money? No   Do you ever drive or ride in a car without wearing a seat belt? No   Have you felt unusual stress, anger or loneliness in the last month? No   Who do you live with? Spouse   If you need help, do you have trouble finding someone available to you? No   Have you been bothered in the last four weeks by sexual problems? No   Do you have difficulty concentrating, remembering or making decisions? Yes           Age-appropriate Screening Schedule:  Refer to the  list below for future screening recommendations based on patient's age, sex and/or medical conditions. Orders for these recommended tests are listed in the plan section. The patient has been provided with a written plan.    Health Maintenance List  Health Maintenance   Topic Date Due    DIABETIC EYE EXAM  Never done    TDAP/TD VACCINES (1 - Tdap) Never done    ZOSTER VACCINE (1 of 2) Never done    INFLUENZA VACCINE  07/01/2024    COVID-19 Vaccine (4 - 2024-25 season) 09/01/2024    DXA SCAN  03/15/2025    HEMOGLOBIN A1C  06/04/2025    ANNUAL WELLNESS VISIT  12/04/2025    BMI FOLLOWUP  12/04/2025    MAMMOGRAM  11/06/2026    COLORECTAL CANCER SCREENING  11/06/2026    HEPATITIS C SCREENING  Completed    Pneumococcal Vaccine 65+  Completed    PAP SMEAR  Discontinued                                                                                                                                                CMS Preventative Services Quick Reference  Risk Factors Identified During Encounter  Immunizations Discussed/Encouraged: Influenza    The above risks/problems have been discussed with the patient.  Pertinent information has been shared with the patient in the After Visit Summary.  An After Visit Summary and PPPS were made available to the patient.    Follow Up:   Next Medicare Wellness visit to be scheduled in 1 year.         Additional E&M Note during same encounter follows:  Patient has additional, significant, and separately identifiable condition(s)/problem(s) that require work above and beyond the Medicare Wellness Visit     Chief Complaint  Medicare Wellness-subsequent    Subjective   HPI  HTN and DM. HTN controlled with Losartan HCTZ. DM was controlled with Metformin but A1c now 7     Review of Systems   Constitutional:  Negative for fatigue and fever.   HENT:  Negative for rhinorrhea and sinus pressure.    Respiratory:  Negative for cough.    Cardiovascular:  Negative for chest pain and leg swelling.  "  Gastrointestinal:  Negative for abdominal pain.   Neurological:  Negative for confusion.              Objective   Vital Signs:  /84   Pulse 78   Temp 98.1 °F (36.7 °C)   Resp 20   Ht 165.1 cm (65\")   Wt 103 kg (226 lb 6.4 oz)   SpO2 98%   BMI 37.67 kg/m²   Physical Exam  Vitals reviewed.   Cardiovascular:      Rate and Rhythm: Normal rate and regular rhythm.   Pulmonary:      Effort: No respiratory distress.      Breath sounds: No wheezing.   Neurological:      General: No focal deficit present.      Mental Status: She is alert and oriented to person, place, and time.   Psychiatric:         Behavior: Behavior normal.                       Assessment and Plan            Medicare annual wellness visit, subsequent  Done today       Controlled type 2 diabetes mellitus without complication, without long-term current use of insulin      Orders:    POC Glycosylated Hemoglobin (Hb A1C)    metFORMIN ER (GLUCOPHAGE-XR) 500 MG 24 hr tablet; Take 2  po qd  Increase metformin from 1 to 2 tabs po qd. Needs her eye exam  Benign essential hypertension  Continue Losartan HCTZ    Orders:    CBC & Differential; Future    Comprehensive Metabolic Panel; Future    Malignant neoplasm of upper-outer quadrant of right breast in female, estrogen receptor positive  Continue Arimidex       Anxiety  Uses xanax prn  The patient has read and signed the [facility] Controlled Substance Contract.  I will continue to see patient for regular follow up appointments.  They are well controlled on their medication.  SRIDHAR is updated every 3 months. The patient is aware of the potential for addiction and dependence.                 Follow Up   No follow-ups on file.  Patient was given instructions and counseling regarding her condition or for health maintenance advice. Please see specific information pulled into the AVS if appropriate.    "

## 2024-12-04 NOTE — PROGRESS NOTES
" Subjective   The ABCs of the Annual Wellness Visit  Medicare Wellness Visit      Mirna Saavedra is a 64 y.o. patient who presents for a Medicare Wellness Visit.    The following portions of the patient's history were reviewed and   updated as appropriate: {history reviewed:20406::\"allergies\",\"current medications\",\"past family history\",\"past medical history\",\"past social history\",\"past surgical history\",\"problem list\"}.    Compared to one year ago, the patient's physical   health is {better worse same:21091}.  Compared to one year ago, the patient's mental   health is {better worse same:18905}.    Recent Hospitalizations:  {Hospital Admission Status in the last 365 days:92060}    Current Medical Providers:  Patient Care Team:  Chiquita Acosta DO as PCP - General  Chiquita Acosta DO as PCP - Family Medicine  Myrna Cummins MD as Referring Physician (General Surgery)  Kayleigh Taveras MD as Consulting Physician (Radiation Oncology)  Isaac Ryan MD as Consulting Physician (Hematology)    Outpatient Medications Prior to Visit   Medication Sig Dispense Refill   • Accu-Chek Softclix Lancets lancets USE TO TEST TWO TIMES A  each 0   • ALPRAZolam (XANAX) 0.5 MG tablet Take 1 tablet by mouth As Needed for Anxiety. 30 tablet 0   • anastrozole (ARIMIDEX) 1 MG tablet TAKE ONE TABLET BY MOUTH DAILY 90 tablet 3   • dexlansoprazole (Dexilant) 60 MG capsule Take 1 capsule by mouth Daily. 90 capsule 0   • glucose blood (Accu-Chek Guide) test strip USE TO CHECK BLOOD SUGAR TWICE DAILY 100 each 0   • glucose monitor monitoring kit Use 1 each 2 (Two) Times a Day. (Patient taking differently: Use 1 each 2 (Two) Times a Day. Accu-check) 1 each 0   • losartan-hydrochlorothiazide (HYZAAR) 100-25 MG per tablet TAKE 1 TABLET BY MOUTH DAILY 90 tablet 2   • metFORMIN ER (GLUCOPHAGE-XR) 500 MG 24 hr tablet TAKE 1 TABLET BY MOUTH DAILY WITH BREAKFAST 90 tablet 2   • vitamin B-12 (CYANOCOBALAMIN) 1000 MCG tablet " "Take 1 tablet by mouth Daily.     • vitamin D3 125 MCG (5000 UT) capsule capsule Take 1 capsule by mouth 2 (Two) Times a Week.       No facility-administered medications prior to visit.     No opioid medication identified on active medication list. I have reviewed chart for other potential  high risk medication/s and harmful drug interactions in the elderly.      Aspirin is not on active medication list.  {ASPIRIN NOT ON MEDICATION LIST INDICATED/NOT INDICATED:73628}.    Patient Active Problem List   Diagnosis   • Acid reflux   • Benign essential hypertension   • Elevated liver enzymes   • Migraine   • Uterine leiomyoma   • EMILY positive   • Erythrocytosis   • High serum ferritin   • Controlled type 2 diabetes mellitus without complication, without long-term current use of insulin   • Chronic neck pain   • Malignant neoplasm of upper-outer quadrant of right breast in female, estrogen receptor positive   • Anxiety     Advance Care Planning {Advance Care Planning Hyperlink:23}Advance Directive is on file.  {ACP Discussion, Advance Directive in EMR:97897}            Objective   Vitals:    12/04/24 1301   BP: 138/84   Pulse: 78   Resp: 20   Temp: 98.1 °F (36.7 °C)   SpO2: 98%   Weight: 103 kg (226 lb 6.4 oz)   Height: 165.1 cm (65\")   PainSc: 0-No pain       Estimated body mass index is 37.67 kg/m² as calculated from the following:    Height as of this encounter: 165.1 cm (65\").    Weight as of this encounter: 103 kg (226 lb 6.4 oz).            Does the patient have evidence of cognitive impairment? {Yes/No:68619}  Lab Results   Component Value Date    HGBA1C 7.0 (A) 12/04/2024                                                                                                Health  Risk Assessment    Smoking Status:  Social History     Tobacco Use   Smoking Status Former   • Current packs/day: 0.00   • Average packs/day: 1 pack/day for 5.0 years (5.0 ttl pk-yrs)   • Types: Cigarettes   • Start date: 1/1/1974   • Quit date: " 1979   • Years since quittin.9   Smokeless Tobacco Never     Alcohol Consumption:  Social History     Substance and Sexual Activity   Alcohol Use Yes    Comment: daily glass of wine       Fall Risk Screen{Jump to Steadi Fall Risk Flowsheet:23}  STEADI Fall Risk Assessment was completed, and patient is at LOW risk for falls.Assessment completed on:2024    Depression Screening{Jump to PHQ SmartForm:23}   Little interest or pleasure in doing things? Not at all   Feeling down, depressed, or hopeless? Not at all   PHQ-2 Total Score 0      Health Habits and Functional and Cognitive Screenin/4/2024     1:05 PM   Functional & Cognitive Status   Do you have difficulty preparing food and eating? No   Do you have difficulty bathing yourself, getting dressed or grooming yourself? No   Do you have difficulty using the toilet? No   Do you have difficulty moving around from place to place? No   Do you have trouble with steps or getting out of a bed or a chair? No   Current Diet Low Carb Diet   Dental Exam Not up to date   Eye Exam Not up to date   Exercise (times per week) 2 times per week   Current Exercises Include Walking   Do you need help using the phone?  No   Are you deaf or do you have serious difficulty hearing?  No   Do you need help to go to places out of walking distance? No   Do you need help shopping? No   Do you need help preparing meals?  No   Do you need help with housework?  No   Do you need help with laundry? No   Do you need help taking your medications? No   Do you need help managing money? No   Do you ever drive or ride in a car without wearing a seat belt? No   Have you felt unusual stress, anger or loneliness in the last month? No   Who do you live with? Spouse   If you need help, do you have trouble finding someone available to you? No   Have you been bothered in the last four weeks by sexual problems? No   Do you have difficulty concentrating, remembering or making decisions? Yes            Age-appropriate Screening Schedule:  Refer to the list below for future screening recommendations based on patient's age, sex and/or medical conditions. Orders for these recommended tests are listed in the plan section. The patient has been provided with a written plan.    Health Maintenance List  Health Maintenance   Topic Date Due   • DIABETIC EYE EXAM  Never done   • TDAP/TD VACCINES (1 - Tdap) Never done   • ZOSTER VACCINE (1 of 2) Never done   • INFLUENZA VACCINE  07/01/2024   • COVID-19 Vaccine (4 - 2024-25 season) 09/01/2024   • DXA SCAN  03/15/2025   • HEMOGLOBIN A1C  06/04/2025   • ANNUAL WELLNESS VISIT  12/04/2025   • BMI FOLLOWUP  12/04/2025   • MAMMOGRAM  11/06/2026   • COLORECTAL CANCER SCREENING  11/06/2026   • HEPATITIS C SCREENING  Completed   • Pneumococcal Vaccine 65+  Completed   • PAP SMEAR  Discontinued                                                                                                                                                CMS Preventative Services Quick Reference  Risk Factors Identified During Encounter  {Medicare Wellness Risk Factors:98920}    The above risks/problems have been discussed with the patient.  Pertinent information has been shared with the patient in the After Visit Summary.  An After Visit Summary and PPPS were made available to the patient.    Follow Up:{Wrapup  Review (Popup)  Advance Care Planning  Labs  CC  Problem List  Visit Diagnosis  Medications  Result Review  Imaging  Health Maintenance  Quality  BestPractice  SmartSets  SnapShot  Encounters  Notes  Media  Procedures :23}   Next Medicare Wellness visit to be scheduled in 1 year.         Additional E&M Note during same encounter follows:  Patient has additional, significant, and separately identifiable condition(s)/problem(s) that require work above and beyond the Medicare Wellness Visit     Chief Complaint  Medicare Wellness-subsequent    Subjective {Problem List   "Visit Diagnosis   Encounters  Notes  Medications  Labs  Result Review Imaging  Media :23}{Help Text;  If performing a Preventative Medicine Visit (e.g. 99397) in addition to AWV, choose the 1st SmartList option below and document any ROS/PE performed.  If performing a separately identifiable E/M service (e.g. 49233), choose 2nd SmartLink option below. This information in red will not appear in the final note after note is signed:75687}  HPI  {AWV/Preventative Exam/EM Progress Note. Use this note if billing for additional Wellness Visit or EM exam in addition to AWV visit (Optional):1485248614}                Objective   Vital Signs:  /84   Pulse 78   Temp 98.1 °F (36.7 °C)   Resp 20   Ht 165.1 cm (65\")   Wt 103 kg (226 lb 6.4 oz)   SpO2 98%   BMI 37.67 kg/m²   Physical Exam    {The following data was reviewed by (Optional):98018}  {Data reviewed (Optional):10806:::1}  {Ambulatory Labs (Optional):52570}          Assessment and Plan {CC Problem List  Visit Diagnosis   ROS  Review (Popup)  Health Maintenance  Quality  BestPractice  Medications  SmartSets  SnapShot Encounters  Media :23}{Help Text; When performing an adult Preventative Medicine Visit (e.g. 62241) using the optional SmartList below can help when documenting any age-appropriate advice given.  When using the SmartList review and update the information based on the unique discussion or advice given to the patient.  As a reminder, diagnosis code Z00.00 (nl exam) or Z00.01 (abnl exam), should be added when this service is performed.  Text will disappear once the note is signed:21992}{Preventative Physical Additional Health Advice List (Optional):9118029217}           Medicare annual wellness visit, subsequent         Controlled type 2 diabetes mellitus without complication, without long-term current use of insulin  {Diabetes (Optional):3175295338}    Orders:  •  POC Glycosylated Hemoglobin (Hb A1C)    Benign essential " hypertension  {Hypertension is (optional):2105706440}         Malignant neoplasm of upper-outer quadrant of right breast in female, estrogen receptor positive         Anxiety               {Time Spent (Optional):93053}  Follow Up {Instructions Charge Capture  Follow-up Communications :23}  No follow-ups on file.  Patient was given instructions and counseling regarding her condition or for health maintenance advice. Please see specific information pulled into the AVS if appropriate.

## 2024-12-04 NOTE — ASSESSMENT & PLAN NOTE
Uses xanax prn  The patient has read and signed the [facility] Controlled Substance Contract.  I will continue to see patient for regular follow up appointments.  They are well controlled on their medication.  SRIDHAR is updated every 3 months. The patient is aware of the potential for addiction and dependence.

## 2024-12-04 NOTE — ASSESSMENT & PLAN NOTE
Continue Losartan HCTZ    Orders:    CBC & Differential; Future    Comprehensive Metabolic Panel; Future

## 2024-12-04 NOTE — ASSESSMENT & PLAN NOTE
Orders:    POC Glycosylated Hemoglobin (Hb A1C)    metFORMIN ER (GLUCOPHAGE-XR) 500 MG 24 hr tablet; Take 2  po qd  Increase metformin from 1 to 2 tabs po qd. Needs her eye exam

## 2024-12-05 ENCOUNTER — TELEPHONE (OUTPATIENT)
Dept: INTERNAL MEDICINE | Facility: CLINIC | Age: 64
End: 2024-12-05
Payer: MEDICARE

## 2024-12-05 LAB
ALBUMIN SERPL-MCNC: 4.2 G/DL (ref 3.5–5.2)
ALBUMIN/GLOB SERPL: 1.2 G/DL
ALP SERPL-CCNC: 130 U/L (ref 39–117)
ALT SERPL W P-5'-P-CCNC: 46 U/L (ref 1–33)
ANION GAP SERPL CALCULATED.3IONS-SCNC: 16 MMOL/L (ref 5–15)
AST SERPL-CCNC: 66 U/L (ref 1–32)
BILIRUB SERPL-MCNC: 1.1 MG/DL (ref 0–1.2)
BUN SERPL-MCNC: 8 MG/DL (ref 8–23)
BUN/CREAT SERPL: 15.7 (ref 7–25)
CALCIUM SPEC-SCNC: 10.1 MG/DL (ref 8.6–10.5)
CHLORIDE SERPL-SCNC: 96 MMOL/L (ref 98–107)
CO2 SERPL-SCNC: 23 MMOL/L (ref 22–29)
CREAT SERPL-MCNC: 0.51 MG/DL (ref 0.57–1)
EGFRCR SERPLBLD CKD-EPI 2021: 103.1 ML/MIN/1.73
GLOBULIN UR ELPH-MCNC: 3.5 GM/DL
GLUCOSE SERPL-MCNC: 214 MG/DL (ref 65–99)
POTASSIUM SERPL-SCNC: 3.6 MMOL/L (ref 3.5–5.2)
PROT SERPL-MCNC: 7.7 G/DL (ref 6–8.5)
SODIUM SERPL-SCNC: 135 MMOL/L (ref 136–145)

## 2024-12-05 NOTE — TELEPHONE ENCOUNTER
COSMO CALLED BACK AND I RELAYED DR CASTILLO' MESSAGE TO HER, YES, SHE IS STILL SEEING A GI DOCTOR.  CALL HER BACK IF ANYTHING ELSE IS NEEDED [phone]  
Called and left  for pt to return call to office.   ----- Message from Chiquita Acosta sent at 12/5/2024  7:28 AM EST -----  Increased liver labs and blood sugars. Is she still seeing GI doctor.     
 used

## 2025-01-13 DIAGNOSIS — E11.9 CONTROLLED TYPE 2 DIABETES MELLITUS WITHOUT COMPLICATION, WITHOUT LONG-TERM CURRENT USE OF INSULIN: Primary | ICD-10-CM

## 2025-01-14 DIAGNOSIS — E11.9 CONTROLLED TYPE 2 DIABETES MELLITUS WITHOUT COMPLICATION, WITHOUT LONG-TERM CURRENT USE OF INSULIN: ICD-10-CM

## 2025-01-14 RX ORDER — BLOOD SUGAR DIAGNOSTIC
STRIP MISCELLANEOUS
Qty: 100 EACH | Refills: 1 | Status: SHIPPED | OUTPATIENT
Start: 2025-01-14

## 2025-01-14 RX ORDER — METFORMIN HYDROCHLORIDE 500 MG/1
TABLET, EXTENDED RELEASE ORAL
Qty: 180 TABLET | Refills: 1 | OUTPATIENT
Start: 2025-01-14

## 2025-01-14 NOTE — TELEPHONE ENCOUNTER
Caller: Mirna Saavedra    Relationship: Self    Best call back number: [phone]     Requested Prescriptions:   Requested Prescriptions     Pending Prescriptions Disp Refills    metFORMIN ER (GLUCOPHAGE-XR) 500 MG 24 hr tablet 180 tablet 1     Sig: Take 2  po qd        Pharmacy where request should be sent: [facility] [id number] [place] [id number] [facility] [phone] [facility] [phone] [facility]     Last office visit with prescribing clinician: 12/4/2024   Last telemedicine visit with prescribing clinician: Visit date not found   Next office visit with prescribing clinician: 6/4/2025     Additional details provided by patient: PATIENT STATED THAT THIS WAS CHANGED TO NOW THE PATIENT TAKES 2 A DAY. PLEASE SEND IN A NEW PRESCRIPTION FOR THE PATIENT WITH THE CORRECT DOSAGE CHANGES.   SHE IS OUT OF MEDICATION

## 2025-01-14 NOTE — TELEPHONE ENCOUNTER
THE PATIENT IS CALLING BACK SHE SPOKE TO THE PHARMACY AND THEY ARE TELLING HER THAT THEY DON'T HAVE A PRESCRIPTION FOR HER FOR METFORMIN

## 2025-01-16 DIAGNOSIS — E11.9 CONTROLLED TYPE 2 DIABETES MELLITUS WITHOUT COMPLICATION, WITHOUT LONG-TERM CURRENT USE OF INSULIN: ICD-10-CM

## 2025-01-17 ENCOUNTER — TELEPHONE (OUTPATIENT)
Dept: INTERNAL MEDICINE | Facility: CLINIC | Age: 65
End: 2025-01-17

## 2025-01-17 DIAGNOSIS — E11.9 CONTROLLED TYPE 2 DIABETES MELLITUS WITHOUT COMPLICATION, WITHOUT LONG-TERM CURRENT USE OF INSULIN: ICD-10-CM

## 2025-01-17 RX ORDER — METFORMIN HYDROCHLORIDE 500 MG/1
TABLET, EXTENDED RELEASE ORAL
Qty: 90 TABLET | OUTPATIENT
Start: 2025-01-17

## 2025-01-17 RX ORDER — METFORMIN HYDROCHLORIDE 500 MG/1
TABLET, EXTENDED RELEASE ORAL
Qty: 180 TABLET | Refills: 1 | Status: SHIPPED | OUTPATIENT
Start: 2025-01-17

## 2025-01-17 NOTE — TELEPHONE ENCOUNTER
Caller: Mirna Saavedra    Relationship: Self    Best call back number: [phone]     What is the best time to reach you: ANYTIME    Who are you requesting to speak with (clinical staff, provider,  specific staff member): NATACHA    What was the call regarding: PATIENT IS OUT OF METFORMIN DUE TO THE CHANGE IN DOSAGE, THE PHARMACY WILL NOT RENEW THE PRESCRIPTION UNTIL THE END OF FEBRUARY. PLEASE ADVISE.

## 2025-01-17 NOTE — TELEPHONE ENCOUNTER
Pt states she has been out of meds for 1 week. She has not picked up the prescription from 12/04/24 for #180 with 1 refill and per the pharmacy they will not release that until February to her. Per the pharmacy they did not receive the prescription on 12/04/24 so I will resend.

## 2025-02-15 DIAGNOSIS — I10 BENIGN ESSENTIAL HYPERTENSION: ICD-10-CM

## 2025-02-15 DIAGNOSIS — C50.411 MALIGNANT NEOPLASM OF UPPER-OUTER QUADRANT OF RIGHT BREAST IN FEMALE, ESTROGEN RECEPTOR POSITIVE: ICD-10-CM

## 2025-02-15 DIAGNOSIS — E11.9 CONTROLLED TYPE 2 DIABETES MELLITUS WITHOUT COMPLICATION, WITHOUT LONG-TERM CURRENT USE OF INSULIN: ICD-10-CM

## 2025-02-15 DIAGNOSIS — Z17.0 MALIGNANT NEOPLASM OF UPPER-OUTER QUADRANT OF RIGHT BREAST IN FEMALE, ESTROGEN RECEPTOR POSITIVE: ICD-10-CM

## 2025-02-15 RX ORDER — METFORMIN HYDROCHLORIDE 500 MG/1
TABLET, EXTENDED RELEASE ORAL
Qty: 90 TABLET | OUTPATIENT
Start: 2025-02-15

## 2025-02-15 RX ORDER — LOSARTAN POTASSIUM AND HYDROCHLOROTHIAZIDE 25; 100 MG/1; MG/1
1 TABLET ORAL DAILY
Qty: 90 TABLET | Refills: 2 | Status: SHIPPED | OUTPATIENT
Start: 2025-02-15

## 2025-02-18 DIAGNOSIS — E11.9 CONTROLLED TYPE 2 DIABETES MELLITUS WITHOUT COMPLICATION, WITHOUT LONG-TERM CURRENT USE OF INSULIN: ICD-10-CM

## 2025-02-18 RX ORDER — ANASTROZOLE 1 MG/1
1 TABLET ORAL DAILY
Qty: 90 TABLET | Refills: 3 | Status: SHIPPED | OUTPATIENT
Start: 2025-02-18

## 2025-02-18 RX ORDER — METFORMIN HYDROCHLORIDE 500 MG/1
TABLET, EXTENDED RELEASE ORAL
Qty: 90 TABLET | OUTPATIENT
Start: 2025-02-18

## 2025-02-18 NOTE — TELEPHONE ENCOUNTER
Last filled:01/17/25  metFORMIN ER (GLUCOPHAGE-XR) 500 MG  Dispense Quantity: 180 tablet Refills: 1   Sig: Take 2  po qd  Associated Diagnoses: Controlled type 2 diabetes mellitus without complication, without long-term current use of insulin [E11.9]  LOV:12/04/24  NOV:06/04/25

## 2025-03-03 RX ORDER — LANCETS
EACH MISCELLANEOUS
Qty: 100 EACH | Refills: 0 | Status: SHIPPED | OUTPATIENT
Start: 2025-03-03

## 2025-03-19 RX ORDER — LANCETS
EACH MISCELLANEOUS
Qty: 100 EACH | Refills: 1 | Status: SHIPPED | OUTPATIENT
Start: 2025-03-19

## 2025-05-09 ENCOUNTER — HOSPITAL ENCOUNTER (OUTPATIENT)
Dept: MAMMOGRAPHY | Facility: HOSPITAL | Age: 65
Discharge: HOME | End: 2025-05-09
Payer: MEDICARE

## 2025-05-09 DIAGNOSIS — R92.8 ABNORMAL MAMMOGRAM: ICD-10-CM

## 2025-05-09 PROCEDURE — 77065 DX MAMMO INCL CAD UNI: CPT

## 2025-07-10 ENCOUNTER — TELEMEDICINE (OUTPATIENT)
Dept: SLEEP MEDICINE | Facility: CLINIC | Age: 65
End: 2025-07-10
Payer: MEDICARE

## 2025-07-10 VITALS — BODY MASS INDEX: 38.86 KG/M2 | HEIGHT: 64 IN

## 2025-07-10 DIAGNOSIS — G47.33 OSA (OBSTRUCTIVE SLEEP APNEA): Primary | ICD-10-CM

## 2025-07-10 PROCEDURE — 1159F MED LIST DOCD IN RCRD: CPT | Performed by: NURSE PRACTITIONER

## 2025-07-10 PROCEDURE — 99213 OFFICE O/P EST LOW 20 MIN: CPT | Performed by: NURSE PRACTITIONER

## 2025-07-10 PROCEDURE — 1160F RVW MEDS BY RX/DR IN RCRD: CPT | Performed by: NURSE PRACTITIONER

## 2025-07-10 NOTE — PROGRESS NOTES
Chief Complaint:   Chief Complaint   Patient presents with    Follow-up       HPI:    Mirna Saavedra is a 64 y.o. female here for follow-up of sleep apnea.  Patient was last seen 4/5/2024.  Patient continues to do well with CPAP therapy.  Patient is sleeping 7 to 8 hours nightly.  Patient goes to sleep quickly and does get up x 1 for the restroom.  Patient does go back to sleep very quickly.  She has an [place] score of 0/24.  Patient is doing well without concern or complaint and will continue therapy.        Current medications are:   Current Outpatient Medications:     Accu-Chek Softclix Lancets lancets, USE TO TEST BLOOD GLUCOSE 2 TIMES A DAY, Disp: 100 each, Rfl: 1    ALPRAZolam (XANAX) 0.5 MG tablet, Take 1 tablet by mouth As Needed for Anxiety., Disp: 30 tablet, Rfl: 0    anastrozole (ARIMIDEX) 1 MG tablet, TAKE 1 TABLET BY MOUTH DAILY, Disp: 90 tablet, Rfl: 3    dexlansoprazole (Dexilant) 60 MG capsule, Take 1 capsule by mouth Daily., Disp: 90 capsule, Rfl: 0    glucose blood (Accu-Chek Guide Test) test strip, USE TO CHECK BLOOD GLUCOSE TWICE DAILY, Disp: 100 each, Rfl: 1    glucose monitor monitoring kit, Use 1 each 2 (Two) Times a Day. (Patient taking differently: Use 1 each 2 (Two) Times a Day. Accu-check), Disp: 1 each, Rfl: 0    losartan-hydrochlorothiazide (HYZAAR) 100-25 MG per tablet, TAKE 1 TABLET BY MOUTH DAILY, Disp: 90 tablet, Rfl: 2    metFORMIN ER (GLUCOPHAGE-XR) 500 MG 24 hr tablet, Take 2  po qd, Disp: 180 tablet, Rfl: 1    vitamin B-12 (CYANOCOBALAMIN) 1000 MCG tablet, Take 1 tablet by mouth Daily., Disp: , Rfl:     vitamin D3 125 MCG (5000 UT) capsule capsule, Take 1 capsule by mouth 2 (Two) Times a Week., Disp: , Rfl: .      The patient's relevant past medical, surgical, family and social history were reviewed and updated in Epic as appropriate.       Review of Systems   Eyes:  Positive for visual disturbance.   Respiratory:  Positive for apnea.    Musculoskeletal:  Positive for neck  "pain.   Allergic/Immunologic: Positive for environmental allergies.   Neurological:  Positive for headaches.   Psychiatric/Behavioral:  Positive for sleep disturbance. The patient is nervous/anxious.    All other systems reviewed and are negative.        Objective:    Physical Exam  Pulmonary:      Effort: Pulmonary effort is normal. No respiratory distress.   Neurological:      Mental Status: She is oriented to person, place, and time.   Psychiatric:         Mood and Affect: Mood normal.         Behavior: Behavior normal.         Thought Content: Thought content normal.         Judgment: Judgment normal.     Ht 162.6 cm (64\")   BMI 38.86 kg/m²       CPAP Report  No download available    The patient continues to use and benefit from CPAP therapy.    ASSESSMENT/PLAN    Diagnoses and all orders for this visit:    1. [place] (obstructive sleep apnea) (Primary)  -     PAP Therapy        Refill supplies x 1 year.  Return to clinic 1 year or sooner as symptoms warrant.    The patient is located in [facility] at home . The patient presents today for telehealth service.  This service was conducted via audio/video technology through a secure [employer] video visit connection through Epic.  This provider is located in [facility].  Patient stated they are in a secure environment for the session.  Patient's condition being diagnosed/treated is appropriate for telemedicine.  The provider identified himself as well as his credentials.  The patient, and/or patient's guardian, consent to be seen remotely, and when consent is given they understanding that the consent allows for patient identifiable information to be sent to a third-party as needed.  They may refuse to be seen remotely at any time.  The electronic data is encrypted and password protected, and the patient and/or guardian has been advised of the potential risk to privacy not withstanding such measures.  Patient identifiers used: Name and date of birth.   I have " reviewed the results of my evaluation and impression and discussed my recommendations in detail with the patient.      Signed by  Hailee Jean, APRN    July 10, 2025      CC: Chiquita Acosta,          No ref. provider found

## 2025-07-11 DIAGNOSIS — E11.9 CONTROLLED TYPE 2 DIABETES MELLITUS WITHOUT COMPLICATION, WITHOUT LONG-TERM CURRENT USE OF INSULIN: ICD-10-CM

## 2025-07-14 DIAGNOSIS — E11.9 CONTROLLED TYPE 2 DIABETES MELLITUS WITHOUT COMPLICATION, WITHOUT LONG-TERM CURRENT USE OF INSULIN: ICD-10-CM

## 2025-07-14 RX ORDER — METFORMIN HYDROCHLORIDE 500 MG/1
1000 TABLET, EXTENDED RELEASE ORAL DAILY
Qty: 180 TABLET | Refills: 1 | Status: SHIPPED | OUTPATIENT
Start: 2025-07-14

## 2025-07-15 RX ORDER — BLOOD SUGAR DIAGNOSTIC
STRIP MISCELLANEOUS
Qty: 100 EACH | Refills: 1 | Status: SHIPPED | OUTPATIENT
Start: 2025-07-15

## 2025-07-28 ENCOUNTER — HOSPITAL ENCOUNTER (OUTPATIENT)
Dept: MRI IMAGING | Facility: HOSPITAL | Age: 65
Discharge: HOME | End: 2025-07-28
Admitting: SURGERY
Payer: MEDICARE

## 2025-07-28 DIAGNOSIS — C50.411 CARCINOMA OF UPPER-OUTER QUADRANT OF FEMALE BREAST, RIGHT: ICD-10-CM

## 2025-07-28 LAB — CREAT BLDA-MCNC: 0.4 MG/DL (ref 0.6–1.3)

## 2025-07-28 PROCEDURE — A9577 INJ MULTIHANCE: HCPCS | Performed by: SURGERY

## 2025-07-28 PROCEDURE — 82565 ASSAY OF CREATININE: CPT

## 2025-07-28 PROCEDURE — C8937 CAD BREAST MRI: HCPCS

## 2025-07-28 PROCEDURE — 25510000002 GADOBENATE DIMEGLUMINE 529 MG/ML SOLUTION: Performed by: SURGERY

## 2025-07-28 PROCEDURE — C8908 MRI W/O FOL W/CONT, BREAST,: HCPCS

## 2025-07-28 RX ADMIN — GADOBENATE DIMEGLUMINE 19 ML: 529 INJECTION, SOLUTION INTRAVENOUS at 16:37

## 2025-08-21 ENCOUNTER — EXTERNAL PBMM DATA (OUTPATIENT)
Dept: PHARMACY | Facility: OTHER | Age: 65
End: 2025-08-21
Payer: MEDICARE